# Patient Record
Sex: FEMALE | Race: WHITE | NOT HISPANIC OR LATINO | Employment: OTHER | URBAN - METROPOLITAN AREA
[De-identification: names, ages, dates, MRNs, and addresses within clinical notes are randomized per-mention and may not be internally consistent; named-entity substitution may affect disease eponyms.]

---

## 2017-04-11 ENCOUNTER — APPOINTMENT (OUTPATIENT)
Dept: WOMENS IMAGING | Facility: HOSPITAL | Age: 77
End: 2017-04-11

## 2017-04-11 PROCEDURE — G0202 SCR MAMMO BI INCL CAD: HCPCS | Performed by: RADIOLOGY

## 2017-04-11 PROCEDURE — 77063 BREAST TOMOSYNTHESIS BI: CPT | Performed by: RADIOLOGY

## 2018-02-07 ENCOUNTER — OFFICE VISIT CONVERTED (OUTPATIENT)
Dept: FAMILY MEDICINE CLINIC | Age: 78
End: 2018-02-07
Attending: FAMILY MEDICINE

## 2018-04-12 ENCOUNTER — APPOINTMENT (OUTPATIENT)
Dept: WOMENS IMAGING | Facility: HOSPITAL | Age: 78
End: 2018-04-12

## 2018-04-12 PROCEDURE — 77067 SCR MAMMO BI INCL CAD: CPT | Performed by: RADIOLOGY

## 2018-06-12 ENCOUNTER — OFFICE VISIT CONVERTED (OUTPATIENT)
Dept: FAMILY MEDICINE CLINIC | Age: 78
End: 2018-06-12
Attending: FAMILY MEDICINE

## 2018-09-12 ENCOUNTER — OFFICE VISIT CONVERTED (OUTPATIENT)
Dept: FAMILY MEDICINE CLINIC | Age: 78
End: 2018-09-12
Attending: FAMILY MEDICINE

## 2018-10-09 ENCOUNTER — OFFICE VISIT CONVERTED (OUTPATIENT)
Dept: FAMILY MEDICINE CLINIC | Age: 78
End: 2018-10-09
Attending: NURSE PRACTITIONER

## 2018-12-10 ENCOUNTER — OFFICE VISIT CONVERTED (OUTPATIENT)
Dept: FAMILY MEDICINE CLINIC | Age: 78
End: 2018-12-10
Attending: FAMILY MEDICINE

## 2019-03-11 ENCOUNTER — OFFICE VISIT CONVERTED (OUTPATIENT)
Dept: FAMILY MEDICINE CLINIC | Age: 79
End: 2019-03-11
Attending: FAMILY MEDICINE

## 2019-03-18 ENCOUNTER — OFFICE VISIT CONVERTED (OUTPATIENT)
Dept: FAMILY MEDICINE CLINIC | Age: 79
End: 2019-03-18
Attending: FAMILY MEDICINE

## 2019-04-24 ENCOUNTER — HOSPITAL ENCOUNTER (OUTPATIENT)
Dept: OTHER | Facility: HOSPITAL | Age: 79
Discharge: HOME | End: 2019-04-24
Attending: FAMILY MEDICINE

## 2019-06-12 ENCOUNTER — OFFICE VISIT CONVERTED (OUTPATIENT)
Dept: FAMILY MEDICINE CLINIC | Age: 79
End: 2019-06-12
Attending: FAMILY MEDICINE

## 2019-09-13 ENCOUNTER — HOSPITAL ENCOUNTER (OUTPATIENT)
Dept: OTHER | Facility: HOSPITAL | Age: 79
Discharge: HOME | End: 2019-09-13
Attending: FAMILY MEDICINE

## 2019-09-13 ENCOUNTER — OFFICE VISIT CONVERTED (OUTPATIENT)
Dept: FAMILY MEDICINE CLINIC | Age: 79
End: 2019-09-13
Attending: FAMILY MEDICINE

## 2019-09-13 LAB
ALBUMIN SERPL-MCNC: 4.6 G/DL (ref 3.5–5)
ALBUMIN/GLOB SERPL: 1.6 {RATIO} (ref 1.4–2.6)
ALP SERPL-CCNC: 58 U/L (ref 43–160)
ALT SERPL-CCNC: 24 U/L (ref 10–40)
ANION GAP SERPL CALC-SCNC: 18 MMOL/L (ref 8–19)
AST SERPL-CCNC: 26 U/L (ref 15–50)
BILIRUB SERPL-MCNC: 0.19 MG/DL (ref 0.2–1.3)
BUN SERPL-MCNC: 20 MG/DL (ref 5–25)
BUN/CREAT SERPL: 19 {RATIO} (ref 6–20)
CALCIUM SERPL-MCNC: 9.9 MG/DL (ref 8.7–10.4)
CHLORIDE SERPL-SCNC: 102 MMOL/L (ref 99–111)
CHOLEST SERPL-MCNC: 211 MG/DL (ref 107–200)
CHOLEST/HDLC SERPL: 4.3 {RATIO} (ref 3–6)
CONV CO2: 26 MMOL/L (ref 22–32)
CONV TOTAL PROTEIN: 7.4 G/DL (ref 6.3–8.2)
CREAT UR-MCNC: 1.07 MG/DL (ref 0.5–0.9)
EST. AVERAGE GLUCOSE BLD GHB EST-MCNC: 163 MG/DL
GFR SERPLBLD BASED ON 1.73 SQ M-ARVRAT: 50 ML/MIN/{1.73_M2}
GLOBULIN UR ELPH-MCNC: 2.8 G/DL (ref 2–3.5)
GLUCOSE SERPL-MCNC: 104 MG/DL (ref 65–99)
HBA1C MFR BLD: 7.3 % (ref 3.5–5.7)
HDLC SERPL-MCNC: 49 MG/DL (ref 40–60)
LDLC SERPL CALC-MCNC: 111 MG/DL (ref 70–100)
OSMOLALITY SERPL CALC.SUM OF ELEC: 297 MOSM/KG (ref 273–304)
POTASSIUM SERPL-SCNC: 4.1 MMOL/L (ref 3.5–5.3)
SODIUM SERPL-SCNC: 142 MMOL/L (ref 135–147)
TRIGL SERPL-MCNC: 255 MG/DL (ref 40–150)
TSH SERPL-ACNC: 2.37 M[IU]/L (ref 0.27–4.2)
URATE SERPL-MCNC: 9.1 MG/DL (ref 2.5–7.5)
VLDLC SERPL-MCNC: 51 MG/DL (ref 5–37)

## 2019-09-18 ENCOUNTER — TRANSCRIBE ORDERS (OUTPATIENT)
Dept: ADMINISTRATIVE | Facility: HOSPITAL | Age: 79
End: 2019-09-18

## 2019-09-18 DIAGNOSIS — L72.9 SKIN CYST: Primary | ICD-10-CM

## 2019-09-25 ENCOUNTER — HOSPITAL ENCOUNTER (OUTPATIENT)
Dept: INFUSION THERAPY | Facility: HOSPITAL | Age: 79
Discharge: HOME | End: 2019-09-25
Admitting: SPECIALIST

## 2019-09-25 VITALS
HEART RATE: 60 BPM | TEMPERATURE: 96.9 F | BODY MASS INDEX: 40.67 KG/M2 | RESPIRATION RATE: 18 BRPM | DIASTOLIC BLOOD PRESSURE: 70 MMHG | SYSTOLIC BLOOD PRESSURE: 154 MMHG | OXYGEN SATURATION: 95 % | HEIGHT: 62 IN | WEIGHT: 221 LBS

## 2019-09-25 DIAGNOSIS — L72.9 SKIN CYST: Primary | ICD-10-CM

## 2019-09-25 PROCEDURE — 87070 CULTURE OTHR SPECIMN AEROBIC: CPT | Performed by: SPECIALIST

## 2019-09-25 PROCEDURE — 88304 TISSUE EXAM BY PATHOLOGIST: CPT | Performed by: SPECIALIST

## 2019-09-25 PROCEDURE — 87205 SMEAR GRAM STAIN: CPT | Performed by: SPECIALIST

## 2019-09-25 RX ORDER — DOXAZOSIN 8 MG/1
8 TABLET ORAL DAILY
COMMUNITY
End: 2021-06-23

## 2019-09-25 RX ORDER — POTASSIUM CHLORIDE 750 MG/1
10 TABLET, FILM COATED, EXTENDED RELEASE ORAL DAILY
COMMUNITY
End: 2021-11-02

## 2019-09-25 RX ORDER — METOPROLOL TARTRATE 50 MG/1
50 TABLET, FILM COATED ORAL DAILY
COMMUNITY
End: 2021-06-23

## 2019-09-25 RX ORDER — CHLORAL HYDRATE 500 MG
1000 CAPSULE ORAL
COMMUNITY
End: 2021-06-23

## 2019-09-25 RX ORDER — BENAZEPRIL HYDROCHLORIDE 40 MG/1
40 TABLET, FILM COATED ORAL DAILY
COMMUNITY
End: 2021-07-27

## 2019-09-25 RX ORDER — ASPIRIN 81 MG/1
81 TABLET ORAL DAILY
COMMUNITY

## 2019-09-25 RX ORDER — TORSEMIDE 20 MG/1
20 TABLET ORAL DAILY
COMMUNITY
End: 2021-06-23 | Stop reason: ALTCHOICE

## 2019-09-25 RX ORDER — MONTELUKAST SODIUM 10 MG/1
10 TABLET ORAL DAILY
COMMUNITY
End: 2022-03-01

## 2019-09-25 RX ORDER — GLIMEPIRIDE 4 MG/1
4 TABLET ORAL 2 TIMES DAILY
COMMUNITY
End: 2021-08-16

## 2019-09-25 RX ORDER — EZETIMIBE 10 MG/1
10 TABLET ORAL DAILY
COMMUNITY
End: 2021-08-16 | Stop reason: ALTCHOICE

## 2019-09-25 RX ORDER — FENOFIBRATE 48 MG/1
48 TABLET, COATED ORAL DAILY
COMMUNITY
End: 2021-11-01

## 2019-09-25 RX ORDER — LEVOTHYROXINE SODIUM 0.05 MG/1
75 TABLET ORAL DAILY
COMMUNITY
End: 2022-03-03 | Stop reason: SDUPTHER

## 2019-09-25 RX ORDER — OMEPRAZOLE 20 MG/1
20 CAPSULE, DELAYED RELEASE ORAL DAILY PRN
COMMUNITY

## 2019-09-25 RX ORDER — LIDOCAINE HYDROCHLORIDE AND EPINEPHRINE 10; 10 MG/ML; UG/ML
20 INJECTION, SOLUTION INFILTRATION; PERINEURAL ONCE
Status: COMPLETED | OUTPATIENT
Start: 2019-09-25 | End: 2019-09-25

## 2019-09-25 RX ADMIN — LIDOCAINE HYDROCHLORIDE,EPINEPHRINE BITARTRATE 18 ML: 10; .01 INJECTION, SOLUTION INFILTRATION; PERINEURAL at 10:32

## 2019-09-25 NOTE — PATIENT INSTRUCTIONS
MINOR SURGERY DISCHARGE INSTRUCTIONS    IMPORTANT:  The following information will help you return to your best level of health.    Wound Care:  ·  Your dressing may be removed:  ·   In 3-4 days.    ·   Leave dressing on for doctor to remove when you see him at his office.  · If incision is near the ear, clean telephones.  · If incision is on head or neck, cover your pillow with a towel.  · Allow steri-strips to come off as you bathe/shower.  Steri-strips will likely have a red or brown blood stain on them.  The incision will still be secure.    · No cosmetics to incision.  · You may use band aids after dressing is removed.  · Avoid stooping over or heavy lifting.    You may shower:  · Tomorrow  · Do not get steri-strips soaking wet.  · Apply ice to area 30 minutes on and off for the rest of the day and possibly the next day if helpful.  · Elevate area for 24-48 hours to reduce swelling.        If incision is on finger or toe:   Do not get dressing wet.  Recommend glove and Glad Press N Seal for showering.    Check the color of skin at the end of the finger or toe.    Medications:   Following this procedure, you should continue to take all other medications as directed by your primary  physician unless otherwise instructed. There have been no changes to the medications you provided  us (with the following exceptions, if applicable):   Resume taking your  Aspirin on 09/26/19   You may use Tylenol or Advil for discomfort.    Activity:   You may increase your activity as tolerated.   You may resume normal activity:     Tomorrow    No strenuous activity, lifting or sports:    Today      Call your doctor to make an appointment for:     On (date) _10/04/19  Dr. Turk     Office # [phone]        Clindamycin capsules  What is this medicine?  CLINDAMYCIN (KLIN da MYE sin) is a lincosamide antibiotic. It is used to treat certain kinds of bacterial infections. It will not work for colds, flu, or other viral  infections.  This medicine may be used for other purposes; ask your health care provider or pharmacist if you have questions.  COMMON BRAND NAME(S): Cleocin  What should I tell my health care provider before I take this medicine?  They need to know if you have any of these conditions:  -kidney disease  -liver disease  -stomach problems like colitis  -an unusual or allergic reaction to clindamycin, lincomycin, or other medicines, foods, dyes like tartrazine or preservatives  -pregnant or trying to get pregnant  -breast-feeding  How should I use this medicine?  Take this medicine by mouth with a full glass of water. Follow the directions on the prescription label. You can take this medicine with food or on an empty stomach. If the medicine upsets your stomach, take it with food. Take your medicine at regular intervals. Do not take your medicine more often than directed. Take all of your medicine as directed even if you think your are better. Do not skip doses or stop your medicine early.  Talk to your pediatrician regarding the use of this medicine in children. Special care may be needed.  Overdosage: If you think you have taken too much of this medicine contact a poison control center or emergency room at once.  NOTE: This medicine is only for you. Do not share this medicine with others.  What if I miss a dose?  If you miss a dose, take it as soon as you can. If it is almost time for your next dose, take only that dose. Do not take double or extra doses.  What may interact with this medicine?  -birth control pills  -erythromycin  -medicines that relax muscles for surgery  -rifampin  This list may not describe all possible interactions. Give your health care provider a list of all the medicines, herbs, non-prescription drugs, or dietary supplements you use. Also tell them if you smoke, drink alcohol, or use illegal drugs. Some items may interact with your medicine.  What should I watch for while using this  medicine?  Tell your doctor or healthcare professional if your symptoms do not start to get better or if they get worse.  Do not treat diarrhea with over the counter products. Contact your doctor if you have diarrhea that lasts more than 2 days or if it is severe and watery.  What side effects may I notice from receiving this medicine?  Side effects that you should report to your doctor or health care professional as soon as possible:  -allergic reactions like skin rash, itching or hives, swelling of the face, lips, or tongue  -dark urine  -pain on swallowing  -redness, blistering, peeling or loosening of the skin, including inside the mouth  -unusual bleeding or bruising  -unusually weak or tired  -yellowing of eyes or skin  Side effects that usually do not require medical attention (report to your doctor or health care professional if they continue or are bothersome):  -diarrhea  -itching in the rectal or genital area  -joint pain  -nausea, vomiting  -stomach pain  This list may not describe all possible side effects. Call your doctor for medical advice about side effects. You may report side effects to FDA at 4-545-FDA-0918.  Where should I keep my medicine?  Keep out of the reach of children.  Store at room temperature between 20 and 25 degrees C (68 and 77 degrees F). Throw away any unused medicine after the expiration date.  NOTE: This sheet is a summary. It may not cover all possible information. If you have questions about this medicine, talk to your doctor, pharmacist, or health care provider.  © 2019 [employer] (2017-03-22 16:34:00)

## 2019-09-25 NOTE — PROGRESS NOTES
Patient tolerated procedure well.  Initial blood pressure elevated and procedure not started until blood pressure in her usual range.  Allergies assessed prior to start of procedure.   Patient history completed after procedure as she has not been to our hospital since Epic documentation instigated in 2016.  Her daughter was allowed back into minor room with patient permission for history.   AVS discussed and copy given to patient.  Discharged home amb with daughter.

## 2019-09-26 LAB
CYTO UR: NORMAL
LAB AP CASE REPORT: NORMAL
LAB AP CLINICAL INFORMATION: NORMAL
PATH REPORT.FINAL DX SPEC: NORMAL
PATH REPORT.GROSS SPEC: NORMAL

## 2019-09-28 LAB
BACTERIA SPEC AEROBE CULT: NORMAL
GRAM STN SPEC: NORMAL

## 2019-12-12 ENCOUNTER — HOSPITAL ENCOUNTER (OUTPATIENT)
Dept: OTHER | Facility: HOSPITAL | Age: 79
Discharge: HOME | End: 2019-12-12
Attending: FAMILY MEDICINE

## 2019-12-12 ENCOUNTER — OFFICE VISIT CONVERTED (OUTPATIENT)
Dept: FAMILY MEDICINE CLINIC | Age: 79
End: 2019-12-12
Attending: FAMILY MEDICINE

## 2019-12-12 LAB — TSH SERPL-ACNC: 2.54 M[IU]/L (ref 0.27–4.2)

## 2020-06-19 ENCOUNTER — OFFICE VISIT CONVERTED (OUTPATIENT)
Dept: FAMILY MEDICINE CLINIC | Age: 80
End: 2020-06-19
Attending: FAMILY MEDICINE

## 2020-06-19 ENCOUNTER — HOSPITAL ENCOUNTER (OUTPATIENT)
Dept: OTHER | Facility: HOSPITAL | Age: 80
Discharge: HOME | End: 2020-06-19
Attending: FAMILY MEDICINE

## 2020-06-19 LAB
ALBUMIN SERPL-MCNC: 4.6 G/DL (ref 3.5–5)
ALBUMIN/GLOB SERPL: 1.6 {RATIO} (ref 1.4–2.6)
ALP SERPL-CCNC: 64 U/L (ref 43–160)
ALT SERPL-CCNC: 35 U/L (ref 10–40)
ANION GAP SERPL CALC-SCNC: 16 MMOL/L (ref 8–19)
AST SERPL-CCNC: 37 U/L (ref 15–50)
BILIRUB SERPL-MCNC: 0.22 MG/DL (ref 0.2–1.3)
BUN SERPL-MCNC: 21 MG/DL (ref 5–25)
BUN/CREAT SERPL: 23 {RATIO} (ref 6–20)
CALCIUM SERPL-MCNC: 10.5 MG/DL (ref 8.7–10.4)
CHLORIDE SERPL-SCNC: 103 MMOL/L (ref 99–111)
CHOLEST SERPL-MCNC: 235 MG/DL (ref 107–200)
CHOLEST/HDLC SERPL: 5 {RATIO} (ref 3–6)
CONV CO2: 25 MMOL/L (ref 22–32)
CONV CREATININE URINE, RANDOM: 12.6 MG/DL (ref 10–300)
CONV MICROALBUM.,U,RANDOM: <12 MG/L (ref 0–20)
CONV TOTAL PROTEIN: 7.4 G/DL (ref 6.3–8.2)
CREAT UR-MCNC: 0.92 MG/DL (ref 0.5–0.9)
EST. AVERAGE GLUCOSE BLD GHB EST-MCNC: 180 MG/DL
GFR SERPLBLD BASED ON 1.73 SQ M-ARVRAT: 59 ML/MIN/{1.73_M2}
GLOBULIN UR ELPH-MCNC: 2.8 G/DL (ref 2–3.5)
GLUCOSE SERPL-MCNC: 128 MG/DL (ref 65–99)
HBA1C MFR BLD: 7.9 % (ref 3.5–5.7)
HDLC SERPL-MCNC: 47 MG/DL (ref 40–60)
LDLC SERPL CALC-MCNC: 136 MG/DL (ref 70–100)
MICROALBUMIN/CREAT UR: 95.2 MG/G{CRE} (ref 0–35)
OSMOLALITY SERPL CALC.SUM OF ELEC: 295 MOSM/KG (ref 273–304)
POTASSIUM SERPL-SCNC: 4.2 MMOL/L (ref 3.5–5.3)
SODIUM SERPL-SCNC: 140 MMOL/L (ref 135–147)
T4 FREE SERPL-MCNC: 1.4 NG/DL (ref 0.9–1.8)
TRIGL SERPL-MCNC: 258 MG/DL (ref 40–150)
TSH SERPL-ACNC: 4.78 M[IU]/L (ref 0.27–4.2)
URATE SERPL-MCNC: 5.8 MG/DL (ref 2.5–7.5)
VLDLC SERPL-MCNC: 52 MG/DL (ref 5–37)

## 2020-07-09 ENCOUNTER — HOSPITAL ENCOUNTER (OUTPATIENT)
Dept: OTHER | Facility: HOSPITAL | Age: 80
Discharge: HOME | End: 2020-07-09
Attending: FAMILY MEDICINE

## 2020-08-06 ENCOUNTER — HOSPITAL ENCOUNTER (OUTPATIENT)
Dept: OTHER | Facility: HOSPITAL | Age: 80
Discharge: HOME | End: 2020-08-06
Attending: FAMILY MEDICINE

## 2020-08-06 LAB — TSH SERPL-ACNC: 1.71 M[IU]/L (ref 0.27–4.2)

## 2020-09-22 ENCOUNTER — HOSPITAL ENCOUNTER (OUTPATIENT)
Dept: OTHER | Facility: HOSPITAL | Age: 80
Discharge: HOME | End: 2020-09-22
Attending: FAMILY MEDICINE

## 2020-09-22 ENCOUNTER — OFFICE VISIT CONVERTED (OUTPATIENT)
Dept: FAMILY MEDICINE CLINIC | Age: 80
End: 2020-09-22
Attending: FAMILY MEDICINE

## 2020-09-22 LAB
TSH SERPL-ACNC: 0.95 M[IU]/L (ref 0.27–4.2)
URATE SERPL-MCNC: 5.6 MG/DL (ref 2.5–7.5)

## 2020-09-23 LAB
ALBUMIN SERPL-MCNC: 4.6 G/DL (ref 3.5–5)
ALBUMIN/GLOB SERPL: 1.8 {RATIO} (ref 1.4–2.6)
ALP SERPL-CCNC: 63 U/L (ref 43–160)
ALT SERPL-CCNC: 37 U/L (ref 10–40)
ANION GAP SERPL CALC-SCNC: 19 MMOL/L (ref 8–19)
AST SERPL-CCNC: 50 U/L (ref 15–50)
BILIRUB SERPL-MCNC: 0.22 MG/DL (ref 0.2–1.3)
BUN SERPL-MCNC: 19 MG/DL (ref 5–25)
BUN/CREAT SERPL: 17 {RATIO} (ref 6–20)
CALCIUM SERPL-MCNC: 10.1 MG/DL (ref 8.7–10.4)
CHLORIDE SERPL-SCNC: 100 MMOL/L (ref 99–111)
CHOLEST SERPL-MCNC: 234 MG/DL (ref 107–200)
CHOLEST/HDLC SERPL: 5 {RATIO} (ref 3–6)
CONV CO2: 27 MMOL/L (ref 22–32)
CONV CREATININE URINE, RANDOM: 71.6 MG/DL (ref 10–300)
CONV MICROALBUM.,U,RANDOM: 14.1 MG/L (ref 0–20)
CONV TOTAL PROTEIN: 7.2 G/DL (ref 6.3–8.2)
CREAT UR-MCNC: 1.1 MG/DL (ref 0.5–0.9)
EST. AVERAGE GLUCOSE BLD GHB EST-MCNC: 166 MG/DL
GFR SERPLBLD BASED ON 1.73 SQ M-ARVRAT: 48 ML/MIN/{1.73_M2}
GLOBULIN UR ELPH-MCNC: 2.6 G/DL (ref 2–3.5)
GLUCOSE SERPL-MCNC: 168 MG/DL (ref 65–99)
HBA1C MFR BLD: 7.4 % (ref 3.5–5.7)
HDLC SERPL-MCNC: 47 MG/DL (ref 40–60)
LDLC SERPL CALC-MCNC: 114 MG/DL (ref 70–100)
MICROALBUMIN/CREAT UR: 19.7 MG/G{CRE} (ref 0–35)
OSMOLALITY SERPL CALC.SUM OF ELEC: 298 MOSM/KG (ref 273–304)
POTASSIUM SERPL-SCNC: 4.6 MMOL/L (ref 3.5–5.3)
SODIUM SERPL-SCNC: 141 MMOL/L (ref 135–147)
TRIGL SERPL-MCNC: 364 MG/DL (ref 40–150)
VLDLC SERPL-MCNC: 73 MG/DL (ref 5–37)

## 2020-11-23 ENCOUNTER — OFFICE VISIT CONVERTED (OUTPATIENT)
Dept: FAMILY MEDICINE CLINIC | Age: 80
End: 2020-11-23
Attending: FAMILY MEDICINE

## 2020-12-18 ENCOUNTER — OFFICE VISIT CONVERTED (OUTPATIENT)
Dept: FAMILY MEDICINE CLINIC | Age: 80
End: 2020-12-18
Attending: FAMILY MEDICINE

## 2021-01-12 ENCOUNTER — HOSPITAL ENCOUNTER (OUTPATIENT)
Dept: OTHER | Facility: HOSPITAL | Age: 81
Discharge: HOME | End: 2021-01-12
Attending: FAMILY MEDICINE

## 2021-01-12 ENCOUNTER — OFFICE VISIT CONVERTED (OUTPATIENT)
Dept: FAMILY MEDICINE CLINIC | Age: 81
End: 2021-01-12
Attending: FAMILY MEDICINE

## 2021-01-12 LAB
ALBUMIN SERPL-MCNC: 4.3 G/DL (ref 3.5–5)
ALBUMIN/GLOB SERPL: 1.4 {RATIO} (ref 1.4–2.6)
ALP SERPL-CCNC: 63 U/L (ref 43–160)
ALT SERPL-CCNC: 20 U/L (ref 10–40)
ANION GAP SERPL CALC-SCNC: 20 MMOL/L (ref 8–19)
AST SERPL-CCNC: 31 U/L (ref 15–50)
BASOPHILS # BLD MANUAL: 0.03 10*3/UL (ref 0–0.2)
BASOPHILS NFR BLD MANUAL: 0.3 % (ref 0–3)
BILIRUB SERPL-MCNC: 0.49 MG/DL (ref 0.2–1.3)
BNP SERPL-MCNC: 966 PG/ML (ref 0–1800)
BUN SERPL-MCNC: 9 MG/DL (ref 5–25)
BUN/CREAT SERPL: 11 {RATIO} (ref 6–20)
CALCIUM SERPL-MCNC: 10.1 MG/DL (ref 8.7–10.4)
CHLORIDE SERPL-SCNC: 103 MMOL/L (ref 99–111)
CONV CO2: 22 MMOL/L (ref 22–32)
CONV TOTAL PROTEIN: 7.3 G/DL (ref 6.3–8.2)
CREAT UR-MCNC: 0.79 MG/DL (ref 0.5–0.9)
DEPRECATED RDW RBC AUTO: 60.4 FL
EOSINOPHIL # BLD MANUAL: 0.36 10*3/UL (ref 0–0.7)
EOSINOPHIL NFR BLD MANUAL: 3.8 % (ref 0–7)
ERYTHROCYTE [DISTWIDTH] IN BLOOD BY AUTOMATED COUNT: 18 % (ref 11.5–14.5)
GFR SERPLBLD BASED ON 1.73 SQ M-ARVRAT: >60 ML/MIN/{1.73_M2}
GLOBULIN UR ELPH-MCNC: 3 G/DL (ref 2–3.5)
GLUCOSE SERPL-MCNC: 127 MG/DL (ref 65–99)
GRANS (ABSOLUTE): 6.71 10*3/UL (ref 2–8)
GRANS: 71.3 % (ref 30–85)
HBA1C MFR BLD: 12.2 G/DL (ref 12–16)
HCT VFR BLD AUTO: 39.6 % (ref 37–47)
IMM GRANULOCYTES # BLD: 0.01 10*3/UL (ref 0–0.54)
IMM GRANULOCYTES NFR BLD: 0.1 % (ref 0–0.43)
LYMPHOCYTES # BLD MANUAL: 1.86 10*3/UL (ref 1–5)
LYMPHOCYTES NFR BLD MANUAL: 4.8 % (ref 3–10)
MCH RBC QN AUTO: 27.8 PG (ref 27–31)
MCHC RBC AUTO-ENTMCNC: 30.8 G/DL (ref 33–37)
MCV RBC AUTO: 90.2 FL (ref 81–99)
MONOCYTES # BLD AUTO: 0.45 10*3/UL (ref 0.2–1.2)
OSMOLALITY SERPL CALC.SUM OF ELEC: 292 MOSM/KG (ref 273–304)
PLATELET # BLD AUTO: 211 10*3/UL (ref 130–400)
PMV BLD AUTO: 12.1 FL (ref 7.4–10.4)
POTASSIUM SERPL-SCNC: 4.3 MMOL/L (ref 3.5–5.3)
RBC # BLD AUTO: 4.39 10*6/UL (ref 4.2–5.4)
SODIUM SERPL-SCNC: 141 MMOL/L (ref 135–147)
VARIANT LYMPHS NFR BLD MANUAL: 19.7 % (ref 20–45)
WBC # BLD AUTO: 9.42 10*3/UL (ref 4.8–10.8)

## 2021-01-29 ENCOUNTER — HOSPITAL ENCOUNTER (OUTPATIENT)
Dept: OTHER | Facility: HOSPITAL | Age: 81
Discharge: HOME | End: 2021-01-29
Attending: FAMILY MEDICINE

## 2021-01-30 LAB
ANION GAP SERPL CALC-SCNC: 18 MMOL/L (ref 8–19)
BUN SERPL-MCNC: 16 MG/DL (ref 5–25)
BUN/CREAT SERPL: 16 {RATIO} (ref 6–20)
CALCIUM SERPL-MCNC: 10 MG/DL (ref 8.7–10.4)
CHLORIDE SERPL-SCNC: 101 MMOL/L (ref 99–111)
CONV CO2: 25 MMOL/L (ref 22–32)
CREAT UR-MCNC: 0.99 MG/DL (ref 0.5–0.9)
GFR SERPLBLD BASED ON 1.73 SQ M-ARVRAT: 54 ML/MIN/{1.73_M2}
GLUCOSE SERPL-MCNC: 98 MG/DL (ref 65–99)
OSMOLALITY SERPL CALC.SUM OF ELEC: 291 MOSM/KG (ref 273–304)
POTASSIUM SERPL-SCNC: 4.1 MMOL/L (ref 3.5–5.3)
SODIUM SERPL-SCNC: 140 MMOL/L (ref 135–147)

## 2021-03-23 ENCOUNTER — OFFICE VISIT CONVERTED (OUTPATIENT)
Dept: FAMILY MEDICINE CLINIC | Age: 81
End: 2021-03-23
Attending: FAMILY MEDICINE

## 2021-03-23 ENCOUNTER — HOSPITAL ENCOUNTER (OUTPATIENT)
Dept: OTHER | Facility: HOSPITAL | Age: 81
Discharge: HOME | End: 2021-03-23
Attending: FAMILY MEDICINE

## 2021-03-23 LAB
ALBUMIN SERPL-MCNC: 4.3 G/DL (ref 3.5–5)
ALBUMIN/GLOB SERPL: 1.5 {RATIO} (ref 1.4–2.6)
ALP SERPL-CCNC: 67 U/L (ref 43–160)
ALT SERPL-CCNC: 18 U/L (ref 10–40)
ANION GAP SERPL CALC-SCNC: 21 MMOL/L (ref 8–19)
APPEARANCE UR: CLEAR
AST SERPL-CCNC: 25 U/L (ref 15–50)
BILIRUB SERPL-MCNC: 0.17 MG/DL (ref 0.2–1.3)
BILIRUB UR QL: NEGATIVE
BUN SERPL-MCNC: 20 MG/DL (ref 5–25)
BUN/CREAT SERPL: 22 {RATIO} (ref 6–20)
CALCIUM SERPL-MCNC: 9.9 MG/DL (ref 8.7–10.4)
CHLORIDE SERPL-SCNC: 103 MMOL/L (ref 99–111)
CHOLEST SERPL-MCNC: 213 MG/DL (ref 107–200)
CHOLEST/HDLC SERPL: 5 {RATIO} (ref 3–6)
COLOR UR: YELLOW
CONV BACTERIA: NEGATIVE
CONV CO2: 21 MMOL/L (ref 22–32)
CONV COLLECTION SOURCE (UA): ABNORMAL
CONV CREATININE URINE, RANDOM: 52.8 MG/DL (ref 10–300)
CONV MICROALBUM.,U,RANDOM: 215.4 MG/L (ref 0–20)
CONV TOTAL PROTEIN: 7.2 G/DL (ref 6.3–8.2)
CONV UROBILINOGEN IN URINE BY AUTOMATED TEST STRIP: 0.2 {EHRLICHU}/DL (ref 0.1–1)
CREAT UR-MCNC: 0.93 MG/DL (ref 0.5–0.9)
EST. AVERAGE GLUCOSE BLD GHB EST-MCNC: 194 MG/DL
GFR SERPLBLD BASED ON 1.73 SQ M-ARVRAT: 58 ML/MIN/{1.73_M2}
GLOBULIN UR ELPH-MCNC: 2.9 G/DL (ref 2–3.5)
GLUCOSE SERPL-MCNC: 139 MG/DL (ref 65–99)
GLUCOSE UR QL: NEGATIVE MG/DL
HBA1C MFR BLD: 8.4 % (ref 3.5–5.7)
HDLC SERPL-MCNC: 43 MG/DL (ref 40–60)
HGB UR QL STRIP: NEGATIVE
KETONES UR QL STRIP: NEGATIVE MG/DL
LDLC SERPL CALC-MCNC: 97 MG/DL (ref 70–100)
LEUKOCYTE ESTERASE UR QL STRIP: ABNORMAL
MICROALBUMIN/CREAT UR: 408 MG/G{CRE} (ref 0–35)
NITRITE UR QL STRIP: NEGATIVE
OSMOLALITY SERPL CALC.SUM OF ELEC: 297 MOSM/KG (ref 273–304)
PH UR STRIP.AUTO: 5 [PH] (ref 5–8)
POTASSIUM SERPL-SCNC: 4.3 MMOL/L (ref 3.5–5.3)
PROT UR QL: 30 MG/DL
RBC #/AREA URNS HPF: ABNORMAL /[HPF]
SODIUM SERPL-SCNC: 141 MMOL/L (ref 135–147)
SP GR UR: 1.01 (ref 1–1.03)
TRIGL SERPL-MCNC: 365 MG/DL (ref 40–150)
TSH SERPL-ACNC: 2.65 M[IU]/L (ref 0.27–4.2)
URATE SERPL-MCNC: 4.9 MG/DL (ref 2.5–7.5)
VLDLC SERPL-MCNC: 73 MG/DL (ref 5–37)
WBC #/AREA URNS HPF: ABNORMAL /[HPF]

## 2021-03-26 LAB — BACTERIA UR CULT: NORMAL

## 2021-05-18 NOTE — PROGRESS NOTES
Mary Chopra  1940     Office/Outpatient Visit    Visit Date: Tue, Sep 22, 2020 03:03 pm    Provider: Sakina Donohue MD (Assistant: Lisset Gan MA)    Location: [facility]        Electronically signed by Sakina Donohue MD on  09/22/2020 05:22:29 PM                             Subjective:        CC: Mrs. Chopra is a 79 year old White female.  Patient presents today for three month follow up;         HPI: Mary is here today for routine f/u on chronic issues.          She is on metformin and Humulin-N for diabetes, taking 16 units QAM and 12 units QHS.  She is on ASA and an ACEI.  She has not been able to tolerate statins but is on Zetia for HLD.  She is UTD on eye exam, last done 9/2019; due for repeat this month.  She is scheduled to go get that done.  She is UTD on foot exam (9/2019).        She is on metoprolol succinate, benazepril, and doxazosin for HTN.  No CP, SOB, palpitations.        She is on levothyroxine for hypothyroidism.  No heat/cold intolerance.  +Dry skin.        She is on Singulair, Flonase, and azelastine for allergies.  Has not been able to afford inhalers for asthma.        She is on prn hydrocodone/APAP for left shoulder pain.  She has been on oxycodone/APAP in the past but has weaned down to the Norco.  She uses #30 tabs every 6 months or so and last got a refill in Dec.          She is on allopurinol for gout.  No recent flares.    ROS:     CONSTITUTIONAL:  Positive for unintentional weight gain ( gradual ).   Negative for fatigue or fever.      EYES:  Positive for blurred vision ( baseline ).      E/N/T:  Positive for nasal congestion, frequent rhinorrhea, sinus pressure and ear pressure and popping.   Negative for ear pain or sore throat.      CARDIOVASCULAR:  Negative for chest pain and palpitations.      RESPIRATORY:  Negative for recent cough and dyspnea.      GASTROINTESTINAL:  Negative for constipation, diarrhea, nausea and vomiting.       MUSCULOSKELETAL:  Positive for arthralgias and back pain.   Negative for myalgias.      NEUROLOGICAL:  Negative for paresthesias and weakness.          Past Medical History / Family History / Social History:         Last Reviewed on 9/22/2020 03:30 PM by Sakina Donohue    Past Medical History:             PREVENTIVE HEALTH MAINTENANCE             BONE DENSITY: was last done 4/24/2019 with normal results     COLORECTAL CANCER SCREENING: Up to date (colonoscopy q10y; sigmoidoscopy q5y; Cologuard q3y) was last done 6/14/16, Results are in chart; colonoscopy with the following abnormalities noted-- tubular adenoma x 2; The next colonoscopy is due  6/2019     DENTAL CLEANING: Refuses due to fear     EYE EXAM: Diabetic Eye Exam during this calendar year and results are in chart was last done 9/23/19 NO diabetic retinopathy     MAMMOGRAM: Done within last 2 years and results in are chart was last done 7/9/2020 with normal results     PAP SMEAR: was last done 12/19/13 with normal results No longer indicated due to age and history         PAST MEDICAL HISTORY         Positive for    Hyperlipidemia and    Hypertension;     Positive for    Pulmonary Hypertension and    RAD;     Postive for    Gastritis and    Esophageal ulcers;     Positive for    Hematuria with malrotation Rt kidney;     Positive for    DDD L-spine, Rt gluteal tendonitis and    Lumbar Radiculitis and L4-L5 Spondylosis '15;     Positive for    Type 2 Diabetes and    Hypothyroidism;     Positive for    Cataract: bilateral; declines surgery; and    Prominent Rt hepatic lobe '15;         CURRENT MEDICAL PROVIDERS:    Allergist: Dr Everett    Cardiologist: Dr Rae-not seen since 2015    Dermatologist: Dr Baumann    General Surgeon: Dr Spencer    Ophthalmologist: Dr Cowan             ADVANCE DIRECTIVES: Living will         Surgical History:         Positive for    Hysterectomy: Abdominal; Partial; ;     Positive for    Trochanter bursa injection '10;,    L-sine  disc and cyst removal '10 (L4-5 and L5-S1);,    L-spine epidural; and    Cystoscopy '05;;         Family History: NO colon, NO Breast, NO Ovarian, NO Prostate Cancer;  CAD;  CVA;         Social History: Laurence Chopra's mom     Occupation:    Retired     Marital Status:          Tobacco/Alcohol/Supplements:     Last Reviewed on 9/22/2020 03:30 PM by Sakina Donohue    Tobacco: She has a past history of cigarette smoking; quit date:  Quit Date 1990.  No Etoh;         Substance Abuse History:     Last Reviewed on 9/22/2020 03:30 PM by Sakina Donohue        Mental Health History:     Last Reviewed on 9/22/2020 03:30 PM by Sakina Donohue        Communicable Diseases (eg STDs):     Last Reviewed on 9/22/2020 03:30 PM by Sakina Donohue        Current Problems:     Last Reviewed on 6/19/2020 09:34 AM by Sakina Donohue    Neoplasm of uncertain behavior of colon    Hypothyroidism, unspecified    Type 2 diabetes mellitus with hyperglycemia    Morbid (severe) obesity due to excess calories    HTN - Essential (primary) hypertension    Moderate persistent asthma with (acute) exacerbation    Pain in unspecified hip    Low back pain    Other long term (current) drug therapy    Other fatigue    Gout, unspecified    HLD - Mixed hyperlipidemia    Age-related cognitive decline    Hemangioma unspecified site    Allergic rhinitis, unspecified    Encounter for screening mammogram for malignant neoplasm of breast    Encounter for screening for depression    Diverticulosis of large intestine without perforation or abscess without bleeding    Encounter for general adult medical examination without abnormal findings    Hematuria, unspecified    Primary pulmonary hypertension    Migraine, unspecified, not intractable, without status migrainosus    Allergic rhinitis        Immunizations:     Td adult 7/1/2008    Prevnar 13 (Pneumococcal PCV 13) 9/28/2016    Fluzone High-Dose pf (>=65 yr) 9/28/2016    Fluzone High-Dose pf (>=65 yr)  11/8/2017    Fluzone High-Dose pf (>=65 yr) 10/15/2018    Fluzone High-Dose pf (>=65 yr) 10/14/2019    PNEUMOVAX 23 (Pneumococcal PPV23) 2/1/2008    Zostavax (Zoster live) 9/1/2012        Allergies:     Last Reviewed on 9/22/2020 03:05 PM by Lisset Gan    Penicillins:      Sulfonamides:      Keflex:      Claritin:      shell fish -muscles:      Monopril:      Serevent (discontinued, use Serevent Diskus):      Avelox:      Crestor: joint pain     Banana:          Current Medications:     Last Reviewed on 9/22/2020 03:05 PM by Lisset Gan    Klor-Con 10 10 mEq oral tablet, extended release [TAKE ONE TABLET BY MOUTH DAILY]    torsemide 20 mg oral tablet [TAKE ONE TABLET BY MOUTH DAILY]    benazepriL 40 mg oral tablet [TAKE ONE TABLET BY MOUTH DAILY]    glimepiride 4 mg oral tablet [TAKE ONE TABLET BY MOUTH TWICE A DAY]    doxazosin 8 mg oral tablet [TAKE 1 TABLET BY MOUTH DAILY]    Mucinex 600 mg oral Tablet,Extended Release 12 hr [1-2 tabs daily PRN]    aspirin 81 mg oral tablet, delayed release (enteric coated) [1 tab daily]    Vitamin D3 1,000 unit oral capsule [2 daily]    Fish Oil 300-1,000 mg oral capsule [1 / day]    traZODone 50 mg oral tablet [1 tab HS PRN]    montelukast 10 mg oral tablet [TAKE ONE TABLET BY MOUTH DAILY]    omeprazole 20 mg oral capsule,delayed release (enteric coated) [TAKE ONE CAPSULE BY MOUTH DAILY]    metFORMIN 500 mg oral tablet [2 po q am and 1 po q evening]    Fluticasone Propionate 50mcg/1actuation Nasal Spray [1 or 2 sprays in each nostril qday ]    Zetia 10 mg oral tablet [1 tab daily (Pt Asst)]    Tricor 48 mg oral tablet [TAKE ONE TABLET BY MOUTH DAILY]    HYDROcodone-acetaminophen 7.5-325 mg oral tablet [1 po qd prn]    HumuLIN N NPH U-100 Insulin 100 unit/mL subcutaneous Suspension [INJECT 18 UNITS UNDER THE SKIN EVERY MORNING AND INJECT 10 UNITS UNDER THE SKIN EVERY NIGHT AT BEDTIME]    HumuLIN N NPH U-100 Insulin 100 unit/mL subcutaneous Suspension [INJECT 18 UNITS  "UNDER THE SKIN EVERY MORNING AND INJECT 10 UNITS UNDER THE SKIN EVERY NIGHT AT BEDTIME]    BD Ultra-Fine 6mm Needle 31G Insulin Syringe 0.3ml Syringe [Use with insulin TID and prn Dx E11.9]    allergy injections every 2-7 days     OTC Xyzal 10mg Take one tablet once daily      ketotifen fumarate 0.025 % (0.035 %) ophthalmic (eye) Drops [1 drop to both eyes bid]    BD Veo Insulin Syringe Ultra-Fine 0.3 mL 31 gauge x 15/64\"  [USE WITH INSULIN THREE TIMES A DAY AND AS NEEDED]    FreeStyle Lite Strips  [CHECK BLOOD SUGAR THREE TIMES A DAY]    Advair Diskus     Glucose Reagent Blood Test Strips  Reagent Strips [check blood sugar QID  DX E11.9]    albuterol sulfate 2.5 mg /3 mL (0.083 %) Inhalation Solution for Nebulization [1 vial q 4 hours prn]    metoprolol succinate 50 mg oral Tablet, Extended Release 24 hr [TAKE ONE TABLET BY MOUTH DAILY]    allopurinoL 300 mg oral tablet [TAKE ONE TABLET BY MOUTH DAILY]    levothyroxine 75 mcg oral tablet [take 1 tablet (75 mcg) by oral route once daily]        Objective:        Vitals:         Current: 9/22/2020 3:08:19 PM    Ht:  5 ft, 1.5 in;  Wt: 212 lbs;  BMI: 39.4T: 97.8 F (temporal);  BP: 161/65 mm Hg (left arm, sitting);  P: 67 bpm (left arm (BP Cuff), sitting);  sCr: 0.92 mg/dL;  GFR: 56.84        Repeat:     3:59:27 PM  BP:   144/47mm Hg (right arm, sitting, HR: 70)     Exams:     PHYSICAL EXAM:     GENERAL: vital signs recorded - well developed, well nourished;  well groomed;  no apparent distress;     EYES: extraocular movements intact; conjunctiva and cornea are normal; PERRLA;     E/N/T: OROPHARYNX:  normal mucosa, dentition, gingiva, and posterior pharynx;     RESPIRATORY: normal respiratory rate and pattern with no distress; normal breath sounds with no rales, rhonchi, wheezes or rubs;     CARDIOVASCULAR: normal rate; rhythm is regular;  no systolic murmur; no edema;     GASTROINTESTINAL: nontender; normal bowel sounds;     MUSCULOSKELETAL: normal gait; normal " overall tone     Left foot exam    Protective sensation using Monofilament test: NORMAL sensation. Patient detects .07 grams of force which is considered normal.    Vascular status: normal peripheral vascular exam with palpable dorsal pedal and posterior tibal pulses and brisk digital capillary refill    Skin is intact without sores or ulcers    Right foot exam    Protective sensation using Monofilament test: NORMAL sensation. Patient detects .07 grams of force which is considered normal.    Vascular status: normal peripheral vascular exam with palpable dorsal pedal and posterior tibal pulses and brisk digital capillary refill    Skin is intact without sores or ulcers         Lab/Test Results:         Urine temperature: confirmed (09/22/2020),     All urine drug screen levels confirmed negative: yes (09/22/2020),     Date and time of last pill: Hydrocodone 2 weeks ago/AS (09/22/2020),     Performed by: sin (09/22/2020),     Collection Time: 1603 (09/22/2020),             Assessment:         E11.65   Type 2 diabetes mellitus with hyperglycemia       I10   HTN - Essential (primary) hypertension       E78.2   HLD - Mixed hyperlipidemia       E03.9   Hypothyroidism, unspecified       M10.9   Gout, unspecified       J45.40   Moderate persistent asthma, uncomplicated       M54.5   Low back pain       Z79.899   Other long term (current) drug therapy       Z23   Encounter for immunization       J01.00   Acute maxillary sinusitis, unspecified           ORDERS:         Meds Prescribed:       [Refilled] HYDROcodone-acetaminophen 7.5-325 mg oral tablet [1 po qd prn], #20 (twenty) tablets, Refills: 0 (zero)         Radiology/Test Orders:       3017F  Colorectal CA screen results documented and reviewed (PV)  (In-House)            2022F  Dilated retinal eye exam w/interpret by ophthalmologist/optometrist documented/reviewed (DM)4  (In-House)              Lab Orders:       APPTO  Appointment need  (In-House)            13419  URIC  - [facility] Uric Acid, Serum  (Send-Out)            43407  TSH - [facility] TSH  (Send-Out)            92003  DIAB1 - [facility] LIPID,CMP, A1C: 10213, 29195, 89370  (Send-Out)            86701  BRIANDA - [facility] Microablbumin, quantitative  (Send-Out)            28316  Drug test prsmv qual dir optical obs per day  (In-House)              Procedures Ordered:       04703  Fluzone High Dose  (In-House)              Other Orders:         Screening mammogram results documented  (Send-Out)            2028F  Foot examination performed (includes examination through visual inspection, sensory exam with monofilament, and pulse exam - report when any of the three components are completed) (DM)4  (In-House)              Administration of influenza virus vaccine  (x1)                  Plan:         Type 2 diabetes mellitus with hyperglycemiaShe is on metformin and Humulin-N for diabetes, taking 16 units QAM and 12 units QHS.  She is on ASA and an ACEI.  She has not been able to tolerate statins but is on Zetia for HLD.  She is UTD on eye exam, last done 9/2019; due for repeat this month.  She is scheduled to go get that done.  She is UTD on foot exam (9/2019).  Checking labs.  RTC 6 months.    LABORATORY:  Labs ordered to be performed today include Diabetes Panel 1; CMP, Lipid, A1C and Microalbumin.  MIPS Vaccines Flu and Pneumonia updated in Shot record Screening mammomgram done within last 2 years and results in are chart Colorectal Cancer Screening is up to date and the results are in the chart Diabetic Eye Exam during this calendar year and results are in chart     FOLLOW-UP: Schedule a follow-up visit in 6 months.:.  f/u DM with [place]          Orders:         Screening mammogram results documented  (Send-Out)            3017F  Colorectal CA screen results documented and reviewed (PV)  (In-House)            2022F  Dilated retinal eye exam w/interpret by ophthalmologist/optometrist documented/reviewed (DM)4  (In-House)             APPTO  Appointment need  (In-House)            90659  DIAB1 - [facility] LIPID,CMP, A1C: 91662, 66512, 03786  (Send-Out)            78323  BRIANDA - [facility] Microablbumin, quantitative  (Send-Out)              HTN - Essential (primary) hypertensionBP at goal.  Checking labs.  No refills needed.        HLD - Mixed hyperlipidemiaStable.  Checking labs.  No refills needed.        Hypothyroidism, unspecifiedStable.  Checking labs.  No refills needed.    LABORATORY:  Labs ordered to be performed today include TSH.            Orders:       [id number]  TSH - [facility] TSH  (Send-Out)              Gout, unspecifiedStable.  Checking labs.  No refills needed.    LABORATORY:  Labs ordered to be performed today include uric acid.            Orders:       65267  URIC - [facility] Uric Acid, Serum  (Send-Out)              Moderate persistent asthma, uncomplicatedStable.  She has not been able to afford inhalers but her breathing has been good despite this.        Low back painStable on current regimen.  Sx well controlled.  No adverse effects.  She does require ongoing use of this controlled substance to function.  Tox screen and Bran run today.  Refills needed.          Prescriptions:       [Refilled] HYDROcodone-acetaminophen 7.5-325 mg oral tablet [1 po qd prn], #20 (twenty) tablets, Refills: 0 (zero)         Other long term (current) drug therapy    Controlled substance documentation: Bran reviewed; drug screen performed and appropriate; consent is reviewed and signed and on the chart.  She is aware of risk of addiction on this medication, understands that she will need to follow up for a review every 3 months and her medications will be adjusted or decreased as deemed appropriate at each visit.  No history of drug or alcohol abuse.  No concerns about diversion or abuse. She denies side effects related to the medication.  She is aware that she may be called in for pill counts.  The dosing of this medication will be reviewed on a regular basis and  reduced if possible..  Ongoing use of a controlled substance is necessary for this patient to have a normal quality of life           Orders:       42481  Drug test prsmv qual dir optical obs per day  (In-House)              Encounter for immunization          Immunizations:       05713  Fluzone High Dose  (In-House)                Dose (ml): 0.7  Site: left deltoid  Route: intramuscular  Administered by: Lisset Gan          : [employer]  Lot #: [id number]  Exp: 06/30/2021          NDC: [address]-16        Administration of influenza virus vaccine  (x1)              Other Orders      2028F  Foot examination performed (includes examination through visual inspection, sensory exam with monofilament, and pulse exam - report when any of the three components are completed) (DM)4  (In-House)              Patient Recommendations:        For  Type 2 diabetes mellitus with hyperglycemia:    Schedule a follow-up visit in 6 months.                APPOINTMENT INFORMATION:        Monday Tuesday Wednesday Thursday Friday Saturday Sunday            Time:___________________AM  PM   Date:_____________________             Charge Capture:         Primary Diagnosis:     E11.65  Type 2 diabetes mellitus with hyperglycemia           Orders:      22563  Office/outpatient visit; established patient, level 4  (In-House)            3017F  Colorectal CA screen results documented and reviewed (PV)  (In-House)            2022F  Dilated retinal eye exam w/interpret by ophthalmologist/optometrist documented/reviewed (DM)4  (In-House)            APPTO  Appointment need  (In-House)              I10  HTN - Essential (primary) hypertension     E78.2  HLD - Mixed hyperlipidemia     E03.9  Hypothyroidism, unspecified     M10.9  Gout, unspecified     J45.40  Moderate persistent asthma, uncomplicated     M54.5  Low back pain     Z79.899  Other long term (current) drug therapy           Orders:      52076  Drug test prsmv qual  dir optical obs per day  (In-House)              Z23  Encounter for immunization           Orders:      31539  Fluzone High Dose  (In-House)              Administration of influenza virus vaccine  (x1)          J01.00  Acute maxillary sinusitis, unspecified         Other Orders:       2028F  Foot examination performed (includes examination through visual inspection, sensory exam with monofilament, and pulse exam - report when any of the three components are completed) (DM)4  (In-House)

## 2021-05-18 NOTE — PROGRESS NOTES
"Mary Chopra J  1940     Office/Outpatient Visit    Visit Date: Tue, Mar 23, 2021 03:04 pm    Provider: Sakina Donohue MD (Assistant: Kadi Garcia, )    Location: [facility]        Electronically signed by Sakina Donohue MD on  03/23/2021 05:28:24 PM                             Subjective:        CC: Mrs. Chopra is a 80 year old White female.  This is a follow-up visit.          HPI: Mary is here today for routine follow-up on chronic issues.  She has gotten her first COVID vaccine!  She did have some reactions to that.        She is on buspirone for anxiety, started after she suffered through COVID back in Dec.  She is still having brain fog.          She is on metformin and glimepiride.  BG has been running \"really high\" -- fasting 140-220.  She has been stress eating and over-eating.  She is on ASA and an ACEI.  She has not been able to tolerate statins but is on Zetia for HLD.  She is due for eye exam, last done 9/2019.  She is due  on foot exam (9/2019).          She is on metoprolol succinate, benazepril, and doxazosin for HTN.  No CP, SOB, palpitations.        She is on Eliquis for atrial fibrillation.  She is on metoprolol succinate for rate control.        She is on levothyroxine for hypothyroidism.  No heat/cold intolerance.  +Dry skin.  She has noticed a lot of hair loss.        She is on montelukast, Flonase, and azelastine for allergies.  Has not been able to afford inhalers for asthma.        She is on allopurinol for gout.  No recent flares.        She has been having severe diffuse joint pains.  She has used hydrocodone/APAP in the past.  Has not gotten any refills on this in a long time, because she has been trying to use them very sparingly.  She has used Tylenol but it hasn't worked well.  She has #4 Norcos left.    ROS:     CONSTITUTIONAL:  Positive for unintentional weight gain ( gradual ).   Negative for fatigue or fever.      EYES:  Positive for " blurred vision ( baseline ).      CARDIOVASCULAR:  Negative for chest pain and palpitations.      RESPIRATORY:  Negative for recent cough and dyspnea.      GASTROINTESTINAL:  Negative for constipation, diarrhea, nausea and vomiting.      GENITOURINARY:  Positive for bubbles when she urinates.      MUSCULOSKELETAL:  Positive for arthralgias and (on Tylenol) back pain.   Negative for myalgias.      NEUROLOGICAL:  Negative for paresthesias and weakness.      ENDOCRINE:  Positive for hair loss.          Past Medical History / Family History / Social History:         Last Reviewed on 3/23/2021 03:32 PM by Sakina Donohue    Past Medical History:             PREVENTIVE HEALTH MAINTENANCE             BONE DENSITY: was last done 4/24/2019 with normal results     COLORECTAL CANCER SCREENING: Up to date (colonoscopy q10y; sigmoidoscopy q5y; Cologuard q3y) was last done 6/14/16, Results are in chart; colonoscopy with the following abnormalities noted-- tubular adenoma x 2; The next colonoscopy is due  6/2019     DENTAL CLEANING: Refuses due to fear     EYE EXAM: Diabetic Eye Exam during this calendar year and results are in chart was last done 9/23/19 NO diabetic retinopathy     MAMMOGRAM: Done within last 2 years and results in are chart was last done 7/9/2020 with normal results     PAP SMEAR: was last done 12/19/13 with normal results No longer indicated due to age and history         PAST MEDICAL HISTORY         Positive for    Hyperlipidemia and    Hypertension;     Positive for    Pulmonary Hypertension and    RAD;     Postive for    Gastritis and    Esophageal ulcers;     Positive for    Hematuria with malrotation Rt kidney;     Positive for    DDD L-spine, Rt gluteal tendonitis and    Lumbar Radiculitis and L4-L5 Spondylosis '15;     Positive for    Type 2 Diabetes and    Hypothyroidism;     Positive for    Cataract: bilateral; declines surgery; and    Prominent Rt hepatic lobe '15;         CURRENT MEDICAL PROVIDERS:     Allergist: Dr Everett    Cardiologist: Dr Rae-not seen since 2015    Dermatologist: Dr Baumann    General Surgeon: Dr Spencer    Ophthalmologist: Dr Cowan             ADVANCE DIRECTIVES: Living will         Surgical History:         Positive for    Hysterectomy: Abdominal; Partial; ;     Positive for    Trochanter bursa injection '10;,    L-sine disc and cyst removal '10 (L4-5 and L5-S1);,    L-spine epidural; and    Cystoscopy '05;;         Family History: NO colon, NO Breast, NO Ovarian, NO Prostate Cancer;  CAD;  CVA;         Social History: Laurence Chopra's mom     Occupation:    Retired     Marital Status:          Tobacco/Alcohol/Supplements:     Last Reviewed on 3/23/2021 03:32 PM by Sakina Donohue    Tobacco: She has a past history of cigarette smoking; quit date:  Quit Date 1990.  No Etoh;         Substance Abuse History:     Last Reviewed on 3/23/2021 03:32 PM by Sakina Donohue        Mental Health History:     Last Reviewed on 3/23/2021 03:32 PM by Sakina Donohue        Communicable Diseases (eg STDs):     Last Reviewed on 3/23/2021 03:32 PM by Sakina Donohue        Current Problems:     Last Reviewed on 1/12/2021 02:19 PM by Sakina Donohue    Neoplasm of uncertain behavior of colon    Hypothyroidism, unspecified    Type 2 diabetes mellitus with hyperglycemia    Morbid (severe) obesity due to excess calories    HTN - Essential (primary) hypertension    Moderate persistent asthma with (acute) exacerbation    Pain in unspecified hip    Low back pain    Other long term (current) drug therapy    Other fatigue    Gout, unspecified    HLD - Mixed hyperlipidemia    Age-related cognitive decline    Hemangioma unspecified site    Allergic rhinitis, unspecified    Diverticulosis of large intestine without perforation or abscess without bleeding    Primary pulmonary hypertension    Hematuria, unspecified    Migraine, unspecified, not intractable, without status migrainosus    Allergic rhinitis     Moderate persistent asthma, uncomplicated    Encounter for immunization    COVID-19    Acute kidney failure, unspecified    Unspecified atrial fibrillation    Encounter for other administrative examinations    Anxiety disorder, unspecified    Shortness of breath        Immunizations:     influenza, high-dose, quadrivalent (FLUZONE HIGH-DOSE QUAD 2020-21) 9/22/2020    Td adult 7/1/2008    Prevnar 13 (Pneumococcal PCV 13) 9/28/2016    Fluzone High-Dose pf (>=65 yr) 9/28/2016    Fluzone High-Dose pf (>=65 yr) 11/8/2017    Fluzone High-Dose pf (>=65 yr) 10/15/2018    Fluzone High-Dose pf (>=65 yr) 10/14/2019    PNEUMOVAX 23 (Pneumococcal PPV23) 2/1/2008    Zostavax (Zoster live) 9/1/2012        Allergies:     Last Reviewed on 1/12/2021 02:19 PM by Sakina Donohue    Penicillins:      Sulfonamides:      Keflex:      Claritin:      shell fish -muscles:      Monopril:      Serevent (discontinued, use Serevent Diskus):      Avelox:      Banana:      Statins-Hmg-Coa Reductase Inhibitors: joint pain,Muscle aches         Current Medications:     Last Reviewed on 1/12/2021 02:19 PM by Sakina Donohue    Mucinex 600 mg oral Tablet,Extended Release 12 hr [1-2 tabs daily PRN]    aspirin 81 mg oral tablet, delayed release (enteric coated) [1 tab daily]    Vitamin D3 1,000 unit oral capsule [2 daily]    Fish Oil 300-1,000 mg oral capsule [1 / day]    glimepiride 4 mg oral tablet [TAKE ONE TABLET BY MOUTH TWICE A DAY]    omeprazole 20 mg oral capsule,delayed release (enteric coated) [TAKE ONE CAPSULE BY MOUTH DAILY]    montelukast 10 mg oral tablet [TAKE ONE TABLET BY MOUTH DAILY]    Fluticasone Propionate 50mcg/1actuation Nasal Spray [1 or 2 sprays in each nostril qday ]    metFORMIN 500 mg oral tablet [TAKE TWO TABLETS BY MOUTH EVERY MORNING AND TAKE ONE TABLET BY MOUTH EVERY EVENING]    Tricor 48 mg oral tablet [TAKE ONE TABLET BY MOUTH DAILY]    Zetia 10 mg oral tablet [1 tab daily (Pt Asst)]    allergy injections every 2-7 days  "    OTC Xyzal 10mg Take one tablet once daily      ketotifen fumarate 0.025 % (0.035 %) ophthalmic (eye) Drops [1 drop to both eyes bid]    FreeStyle Lite Strips  [CHECK BLOOD SUGAR THREE TIMES A DAY]    allopurinoL 300 mg oral tablet [TAKE ONE TABLET BY MOUTH DAILY]    levothyroxine 75 mcg oral tablet [take 1 tablet (75 mcg) by oral route once daily]    amLODIPine 5 mg oral tablet [take 1 tablet (5 mg) by oral route once daily]    busPIRone 5 mg oral tablet [take 1 tablet (5 mg) by oral route 2 times per day prn anxiety]    furosemide 20 mg oral tablet [TAKE 1 TABLET BY MOUTH DAILY]    benazepriL 20 mg oral tablet [1 tab daily]    Eliquis 5 mg oral tablet [take 1 tablet (5 mg) by oral route 2 times per day]    metoprolol succinate 100 mg oral Tablet, Extended Release 24 hr [take 1 tablet (100 mg) by oral route twice daily]        Objective:        Vitals:         Current: 3/23/2021 3:13:19 PM    Ht:  5 ft, 1.5 in;  Wt: 199 lbs;  BMI: 37.0T: 97.5 F (temporal);  BP: 155/68 mm Hg (left arm, sitting);  P: 60 bpm (left arm (BP Cuff), sitting);  sCr: 0.99 mg/dL;  GFR: 50.62        Exams:     PHYSICAL EXAM:     GENERAL: vital signs recorded - well developed, well nourished;  well groomed;  no apparent distress;     EYES: extraocular movements intact; conjunctiva and cornea are normal; PERRLA;     E/N/T: OROPHARYNX:  normal mucosa, dentition, gingiva, and posterior pharynx;     RESPIRATORY: normal respiratory rate and pattern with no distress; normal breath sounds with no rales, rhonchi, wheezes or rubs;     CARDIOVASCULAR: normal rate; rhythm is regular;  no systolic murmur; no edema;     GASTROINTESTINAL: nontender; normal bowel sounds;     MUSCULOSKELETAL: normal gait; normal overall tone         Lab/Test Results:         Urine temperature: confirmed (03/23/2021),     All urine drug screen levels confirmed negative: yes (03/23/2021),     Date and time of last pill: hydrocodone \"a month or more\"//ag (03/23/2021),     " Performed by: mariluz (03/23/2021),     Collection Time: 1615 (03/23/2021),             Procedures:     Allergic rhinitis, unspecified    Allergy Injection (2 or more) 1. Patient experienced no reaction.  exp 7/31/21, pt on Metoprolol/ pdr             Assessment:         E11.65   Type 2 diabetes mellitus with hyperglycemia       I10   HTN - Essential (primary) hypertension       E78.2   HLD - Mixed hyperlipidemia       I48.91   Unspecified atrial fibrillation       E03.9   Hypothyroidism, unspecified       M10.9   Gout, unspecified       J30.9   Allergic rhinitis, unspecified       F41.9   Anxiety disorder, unspecified       M15.0   Primary generalized (osteo)arthritis       Z79.899   Other long term (current) drug therapy           ORDERS:         Meds Prescribed:       [New Rx] Voltaren 1 % Topical Gel [apply 2 gram to the affected area(s) by topical route BID prn], #100 (one hundred) grams, Refills: 1 (one)       [Refilled] Zetia 10 mg oral tablet [1 tab daily (Pt Asst)], #90 (ninety) tablets, Refills: 1 (one)       [New Rx] HYDROcodone-acetaminophen 5-325 mg oral tablet [take 1 tablet by oral route daily prn pain], #30 (thirty) tablets, Refills: 0 (zero)       [New Rx] SITagliptin 100 mg oral tablet [take 1 tablet (100 mg) by oral route once daily], #30 (thirty) tablets, Refills: 5 (five)       [Refilled] omeprazole 20 mg oral capsule,delayed release (enteric coated) [TAKE ONE CAPSULE BY MOUTH DAILY], #90 (ninety) capsules, Refills: 1 (one)         Radiology/Test Orders:       3017F  Colorectal CA screen results documented and reviewed (PV)  (In-House)            42076  Professional services for allergen immunotherapy not including provision of allergenic extracts; two or more injections  (In-House)              Lab Orders:       [id number]  TSH - [facility] TSH  (Send-Out)            10230  DIAB1 - [facility] LIPID,CMP, A1C: 61582, 59134, 73500  (Send-Out)            78423  BRIANDA - [facility] Microablbumin, quantitative  (Send-Out)            " APPTO  Appointment need  (In-House)            70748  BDUAM - [facility] Urinalysis, automated, with micro  (Send-Out)            62572  URIC [facility] Uric Acid, Serum  (Send-Out)            22282  Drug test prsmv qual dir optical obs per day  (In-House)              Other Orders:         Screening mammogram results documented  (Send-Out)            1124F  Advance Care Planning discussed and doc in MR; no surrogate named or advance care plan provided  (Send-Out)                      Plan:         Type 2 diabetes mellitus with hyperglycemiaShe is on metformin and glimepiride.  BG has been running \"really high\" -- fasting 140-220.  She has been stress eating and over-eating.  Adding Januvia.  She is on ASA and an ACEI.  She has not been able to tolerate statins but is on Zetia and Tricor for HLD.  She is due for eye exam, last done 9/2019.  She is due on foot exam (9/2019).  RTC 3 months.        43 min were spent in this face to face encounter.    LABORATORY:  Labs ordered to be performed today include Diabetes Panel 1; CMP, Lipid, A1C, Microalbumin, and urinalysis with micro.  MIPS Vaccines Flu and Pneumonia updated in Shot record Screening mammomgram done within last 2 years and results in are chart Colorectal Cancer Screening is up to date and the results are in the chart Advance Directive/Surrogate Decision Maker discussed and pt declines to complete today     FOLLOW-UP: Schedule a follow-up visit in 3 months.:.  Medicare wellness 30 min with Jayde          Prescriptions:       [New Rx] SITagliptin 100 mg oral tablet [take 1 tablet (100 mg) by oral route once daily], #30 (thirty) tablets, Refills: 5 (five)       [Refilled] omeprazole 20 mg oral capsule,delayed release (enteric coated) [TAKE ONE CAPSULE BY MOUTH DAILY], #90 (ninety) capsules, Refills: 1 (one)           Orders:       [id number]  DIAB1 - [facility] LIPID,CMP, A1C: 65877, 82584, 34335  (Send-Out)            93216  BRIANDA [facility] Microablbumin, quantitative  (Send-Out) "              Screening mammogram results documented  (Send-Out)            3017F  Colorectal CA screen results documented and reviewed (PV)  (In-House)            1124F  Advance Care Planning discussed and doc in MR; no surrogate named or advance care plan provided  (Send-Out)            APPTO  Appointment need  (In-House)            66268  BDUAM - [facility] Urinalysis, automated, with micro  (Send-Out)              HTN - Essential (primary) hypertensionStable.  No refills needed.  Checking labs.        HLD - Mixed hyperlipidemiaRefills needed.  Checking labs.          Prescriptions:       [Refilled] Zetia 10 mg oral tablet [1 tab daily (Pt Asst)], #90 (ninety) tablets, Refills: 1 (one)         Unspecified atrial fibrillationNo refills needed.  Checking labs.  Explained her disease process at length.        Hypothyroidism, unspecifiedNo refills needed.  Checking labs.     LABORATORY:  Labs ordered to be performed today include TSH.            Orders:       [id number]  TSH [facility] TSH  (Send-Out)              Gout, unspecifiedNo refills needed.  Checking labs.     LABORATORY:  Labs ordered to be performed today include uric acid.            Orders:       [id number]  URIC - [facility] Uric Acid, Serum  (Send-Out)              Allergic rhinitis, unspecifiedStable.  No refills needed.          Orders:       60480  Professional services for allergen immunotherapy not including provision of allergenic extracts; two or more injections  (In-House)              Anxiety disorder, unspecifiedStable.  No refills.        Primary generalized (osteo)arthritisStable on current regimen.  Sx well controlled.  No adverse effects.  She does require ongoing use of this controlled substance to function; uses it very sparingly.  Tox screen and Bran run today.  No refills needed.  RTC 3 months.          Prescriptions:       [New Rx] Voltaren 1 % Topical Gel [apply 2 gram to the affected area(s) by topical route BID prn], #100 (one hundred) grams, Refills: 1  (one)       [New Rx] HYDROcodone-acetaminophen 5-325 mg oral tablet [take 1 tablet by oral route daily prn pain], #30 (thirty) tablets, Refills: 0 (zero)         Other long term (current) drug therapy    Controlled substance documentation: Bran reviewed; drug screen performed and appropriate; consent is reviewed and signed and on the chart.  She is aware of risk of addiction on this medication, understands that she will need to follow up for a review every 3 months and her medications will be adjusted or decreased as deemed appropriate at each visit.  No history of drug or alcohol abuse.  No concerns about diversion or abuse. She denies side effects related to the medication.  She is aware that she may be called in for pill counts.  The dosing of this medication will be reviewed on a regular basis and reduced if possible..  Ongoing use of a controlled substance is necessary for this patient to have a normal quality of life           Orders:       57047  Drug test prsmv qual dir optical obs per day  (In-House)                  Patient Recommendations:        For  Type 2 diabetes mellitus with hyperglycemia:    Schedule a follow-up visit in 3 months.                APPOINTMENT INFORMATION:        Monday Tuesday Wednesday Thursday Friday Saturday Sunday            Time:___________________AM  PM   Date:_____________________             Charge Capture:         Primary Diagnosis:     E11.65  Type 2 diabetes mellitus with hyperglycemia           Orders:      11738  Office/outpatient visit; established patient, level 5  (In-House)            3017F  Colorectal CA screen results documented and reviewed (PV)  (In-House)            APPTO  Appointment need  (In-House)              I10  HTN - Essential (primary) hypertension     E78.2  HLD - Mixed hyperlipidemia     I48.91  Unspecified atrial fibrillation     E03.9  Hypothyroidism, unspecified     M10.9  Gout, unspecified     J30.9  Allergic rhinitis, unspecified            Orders:      93375  Professional services for allergen immunotherapy not including provision of allergenic extracts; two or more injections  (In-House)              F41.9  Anxiety disorder, unspecified     M15.0  Primary generalized (osteo)arthritis     Z79.899  Other long term (current) drug therapy           Orders:      23561  Drug test prsmv qual dir optical obs per day  (In-House)

## 2021-05-18 NOTE — PROGRESS NOTES
Mary Chopra  1940     Office/Outpatient Visit    Visit Date: Fri, Dec 18, 2020 09:03 am    Provider: Kory Gardner MD (Assistant: Kadi Garcia, )    Location: [facility]        Electronically signed by Kory Gardner MD on  12/20/2020 06:01:53 AM                             Subjective:        CC: atrial fibrillation, COVID        TELEMEDICINE VISIT:    - Mary consented to this telemedicine visit.    - Persons present during the telemedicine consultation include:  Mary - patient, Dr. Gardner    - [facility] with [facility] and video.Mrs. Chopra is a 80 year old White female.  She is here today following a transition of care from an inpatient hospital: [facility] for COVID & Afib. The patient was admitted on 11/30/20 and discharged on 12/10/20.. Our office called the patient within 48 hours of discharge and scheduled the follow-up appointment.. During the patient's hospital stay the patient was treated by  Dr Xuan Randall (hospitalist) and Dr Aamir Yañez (cardiology) and Dr Samir Rossi (cardiology).  [facility] [phone];         HPI:       Mary is being seen today for follow up after her recent stay at [place].  She was admitted due to weakness and confusion.  She was diagnosed with COVID ultimately.  She says that she is doing okay with regard to COVID.  She is breathing fairly well.  She denies any fever.  Mary is staying with her daughter right now.  She is getting along fairly well at this time.  Home health has been seeing her.          She also developed a fib while in [place].  This is a new diagnosis for her for which she was placed on Eliquis and metoprolol.  She is tolerating those medications fairly well.  She does have follow up scheduled with cardiology just after the first of the year.          Additionally, she presents with history of hTN - Essential (primary) hypertension.  her current cardiac  medication regimen includes a beta-blocker ( Toprol-XL ) and an ACE inhibitor ( Lotensin ).  She has not kept a blood pressure diary, but states that pressures have been okay.  She is tolerating the medication well without side effects.  Compliance with treatment has been good; she takes her medication as directed and follows up as directed.            Mary did have some issue with renal function initially in [place].  Her levels had returned to baseline by discharge.  She is drinking adequately now and making decent urine.          Mary also has type 2 diabetes for which she remains on glimepiride.  She did have an A1C of around 7.5% while in [place].  Prior to hospital stay, she was taking insulin regularly.    ROS:     CONSTITUTIONAL:  Negative for chills and fever.      CARDIOVASCULAR:  Negative for chest pain and palpitations.      RESPIRATORY:  Negative for recent cough and dyspnea.      GASTROINTESTINAL:  Negative for abdominal pain, nausea and vomiting.      INTEGUMENTARY/BREAST:  Negative for atypical mole(s) and rash.          Past Medical History / Family History / Social History:         Last Reviewed on 12/18/2020 09:16 AM by Kory Gardner    Past Medical History:             PREVENTIVE HEALTH MAINTENANCE             BONE DENSITY: was last done 4/24/2019 with normal results     COLORECTAL CANCER SCREENING: Up to date (colonoscopy q10y; sigmoidoscopy q5y; Cologuard q3y) was last done 6/14/16, Results are in chart; colonoscopy with the following abnormalities noted-- tubular adenoma x 2; The next colonoscopy is due  6/2019     DENTAL CLEANING: Refuses due to fear     EYE EXAM: Diabetic Eye Exam during this calendar year and results are in chart was last done 9/23/19 NO diabetic retinopathy     MAMMOGRAM: Done within last 2 years and results in are chart was last done 7/9/2020 with normal results     PAP SMEAR: was last done 12/19/13 with normal results No longer indicated due to age and history          PAST MEDICAL HISTORY         Positive for    Hyperlipidemia and    Hypertension;     Positive for    Pulmonary Hypertension and    RAD;     Postive for    Gastritis and    Esophageal ulcers;     Positive for    Hematuria with malrotation Rt kidney;     Positive for    DDD L-spine, Rt gluteal tendonitis and    Lumbar Radiculitis and L4-L5 Spondylosis '15;     Positive for    Type 2 Diabetes and    Hypothyroidism;     Positive for    Cataract: bilateral; declines surgery; and    Prominent Rt hepatic lobe '15;         CURRENT MEDICAL PROVIDERS:    Allergist: Dr Everett    Cardiologist: Dr Rae-not seen since 2015    Dermatologist: Dr Baumann    General Surgeon: Dr Spencer    Ophthalmologist: Dr Cowan             ADVANCE DIRECTIVES: Living will         Surgical History:         Positive for    Hysterectomy: Abdominal; Partial; ;     Positive for    Trochanter bursa injection '10;,    L-sine disc and cyst removal '10 (L4-5 and L5-S1);,    L-spine epidural; and    Cystoscopy '05;;         Family History: NO colon, NO Breast, NO Ovarian, NO Prostate Cancer;  CAD;  CVA;         Social History: Laurence Chopra's mom     Occupation:    Retired     Marital Status:          Tobacco/Alcohol/Supplements:     Last Reviewed on 12/18/2020 09:16 AM by Kory Gardner    Tobacco: She has a past history of cigarette smoking; quit date:  Quit Date 1990.  No Etoh;         Substance Abuse History:     Last Reviewed on 12/18/2020 09:16 AM by Kory Gardner        Mental Health History:     Last Reviewed on 12/18/2020 09:16 AM by Kory Gardner        Communicable Diseases (eg STDs):     Last Reviewed on 12/18/2020 09:16 AM by Kory Gardner        Current Problems:     Last Reviewed on 12/18/2020 09:16 AM by Kory Gardner    Neoplasm of uncertain behavior of colon    Hypothyroidism, unspecified    Type 2 diabetes mellitus with hyperglycemia    Morbid (severe) obesity due to excess  calories    HTN - Essential (primary) hypertension    Moderate persistent asthma with (acute) exacerbation    Pain in unspecified hip    Low back pain    Other long term (current) drug therapy    Gout, unspecified    Other fatigue    HLD - Mixed hyperlipidemia    Age-related cognitive decline    Hemangioma unspecified site    Allergic rhinitis, unspecified    Encounter for screening mammogram for malignant neoplasm of breast    Encounter for screening for depression    Encounter for general adult medical examination without abnormal findings    Hematuria, unspecified    Diverticulosis of large intestine without perforation or abscess without bleeding    Migraine, unspecified, not intractable, without status migrainosus    Primary pulmonary hypertension    Allergic rhinitis    Moderate persistent asthma, uncomplicated    Acute maxillary sinusitis, unspecified    Encounter for immunization    Acute upper respiratory infection, unspecified    Cough    Encounter for follow-up examination after completed treatment for conditions other than malignant neoplasm    COVID-19    Unspecified atrial fibrillation        Immunizations:     influenza, high-dose, quadrivalent (FLUZONE HIGH-DOSE QUAD 2020-21) 9/22/2020    Td adult 7/1/2008    Prevnar 13 (Pneumococcal PCV 13) 9/28/2016    Fluzone High-Dose pf (>=65 yr) 9/28/2016    Fluzone High-Dose pf (>=65 yr) 11/8/2017    Fluzone High-Dose pf (>=65 yr) 10/15/2018    Fluzone High-Dose pf (>=65 yr) 10/14/2019    PNEUMOVAX 23 (Pneumococcal PPV23) 2/1/2008    Zostavax (Zoster live) 9/1/2012        Allergies:     Last Reviewed on 12/18/2020 09:16 AM by Kory Gardner    Penicillins:      Sulfonamides:      Keflex:      Claritin:      shell fish -muscles:      Monopril:      Serevent (discontinued, use Serevent Diskus):      Avelox:      Crestor: joint pain     Banana:          Current Medications:     Last Reviewed on 12/18/2020 09:16 AM by Kory Gardner    Eliquis 5 mg  "oral tablet [take 1 tablet (5 mg) by oral route 2 times per day]    metoprolol succinate 100 mg oral Tablet, Extended Release 24 hr [take 1 tablet (100 mg) by oral route twice daily]    glimepiride 4 mg oral tablet [TAKE ONE TABLET BY MOUTH TWICE A DAY]    benazepriL 40 mg oral tablet [TAKE 1/2 TABLET (20mg) BY MOUTH DAILY]    omeprazole 20 mg oral capsule,delayed release (enteric coated) [TAKE ONE CAPSULE BY MOUTH DAILY]    Mucinex 600 mg oral Tablet,Extended Release 12 hr [1-2 tabs daily PRN]    aspirin 81 mg oral tablet, delayed release (enteric coated) [1 tab daily]    Vitamin D3 1,000 unit oral capsule [2 daily]    Fish Oil 300-1,000 mg oral capsule [1 / day]    montelukast 10 mg oral tablet [TAKE ONE TABLET BY MOUTH DAILY]    metFORMIN 500 mg oral tablet [TAKE TWO TABLETS BY MOUTH EVERY MORNING AND TAKE ONE TABLET BY MOUTH EVERY EVENING]    Fluticasone Propionate 50mcg/1actuation Nasal Spray [1 or 2 sprays in each nostril qday ]    Tricor 48 mg oral tablet [TAKE ONE TABLET BY MOUTH DAILY]    Zetia 10 mg oral tablet [1 tab daily (Pt Asst)]    BD Ultra-Fine 6mm Needle 31G Insulin Syringe 0.3ml Syringe [Use with insulin TID and prn Dx E11.9]    allergy injections every 2-7 days     OTC Xyzal 10mg Take one tablet once daily      ketotifen fumarate 0.025 % (0.035 %) ophthalmic (eye) Drops [1 drop to both eyes bid]    BD Veo Insulin Syringe Ultra-Fine 0.3 mL 31 gauge x 15/64\"  [USE WITH INSULIN THREE TIMES A DAY AND AS NEEDED]    FreeStyle Lite Strips  [CHECK BLOOD SUGAR THREE TIMES A DAY]    allopurinoL 300 mg oral tablet [TAKE ONE TABLET BY MOUTH DAILY]    levothyroxine 75 mcg oral tablet [take 1 tablet (75 mcg) by oral route once daily]        Objective:        Exams:     PHYSICAL EXAM:     GENERAL: well developed, well nourished;  no apparent distress;     EYES: extraocular movements intact; conjunctiva and cornea are normal;     RESPIRATORY: normal respiratory rate and pattern with no distress;     LYMPHATIC: " no enlargement of cervical or facial nodes; no supraclavicular nodes;     BREAST/INTEGUMENT: SKIN: no significant rashes or lesions; no suspicious moles;     NEUROLOGIC: mental status: alert and oriented x 3; cranial nerves II-XII grossly intact;     PSYCHIATRIC: appropriate affect and demeanor; normal psychomotor function;         Lab/Test Results: H&P, labs, D/C summary from [place] reviewed.        Assessment:         U07.1   COVID-19       I48.91   Unspecified atrial fibrillation       I10   HTN - Essential (primary) hypertension       N17.9   Acute kidney failure, unspecified       E11.65   Type 2 diabetes mellitus with hyperglycemia           Plan:         COVID-19        RECOMMENDATIONS given include: Today, we have reviewed her care.  She is making fairly good progress. She is still somewhat weak, but she is moving in a better direction.  I have encouraged her to continue to work with home health presently.  We will see how things progress..          Unspecified atrial fibrillationShe should follow up with cardiology as scheduled.  Cautions regarding Eliquis are discussed.        HTN - Essential (primary) hypertensionHer pressures have been doing okay.  She is back on benazepril.        Acute kidney failure, unspecifiedHer most recent kidney functions at [place] were normalizing.  I'm not planning to make changes at this time.          Type 2 diabetes mellitus with hyperglycemiaHer A1C in [place] is noted.  I have advised that she continue current care and monitor sugars.  If the sugar starts moving back up, then resumption of insulin would be reasonable.  We will follow.            Charge Capture:         Primary Diagnosis:     U07.1  COVID-19           Orders:      [id number]  Transitional care manage service 14 day discharge  (In-House)              I48.91  Unspecified atrial fibrillation     I10  HTN - Essential (primary) hypertension     N17.9  Acute kidney failure, unspecified     E11.65  Type 2 diabetes  mellitus with hyperglycemia

## 2021-05-18 NOTE — PROGRESS NOTES
Mary Chopra  1940     Office/Outpatient Visit    Visit Date: Tue, Jan 12, 2021 02:00 pm    Provider: Sakina Donohue MD (Assistant: Jing Rene,  )    Location: [facility]        Electronically signed by Sakina Donohue MD on  01/15/2021 05:17:07 PM                             Subjective:        CC: Mrs. Chopra is a 80 year old White female.  short of breath, had covid in hospital until 12/10/26, r, has; shortness of breath for one week        HPI: Mary reports that she started feeling bad back in November.  She stopped eating and stopped using her insulin.  After a week or two, she went to [facility] where she was diagnosed with COVID.  She was hospitalized right at the end of November till 12/10/20.  She was discharged at that time after being diagnosed with atrial fibrillation.  She was started on Eliquis.  She stayed with Laurence until 12/26, and then she went home.  Last night, she woke up one night and could not breathe.  She felt very panicky.  Since then, she has intermittent SOB continuously.  She is afraid that this may be related to her COVID.  She wonders if the Eliquis might be responsible, or possibly due to missing 8 weeks' worth of allergy shots.  She also is concerned that there is an anxiety component.    ROS:     CONSTITUTIONAL:  Positive for fatigue.   Negative for chills or fever.      EYES:  Negative for blurred vision.      E/N/T:  Negative for diminished hearing and nasal congestion.      CARDIOVASCULAR:  Negative for chest pain and palpitations.      RESPIRATORY:  Positive for dyspnea.   Negative for recent cough or frequent wheezing.      GASTROINTESTINAL:  Negative for abdominal pain, constipation, diarrhea, nausea and vomiting.      GENITOURINARY:  Negative for dysuria and urinary incontinence.      MUSCULOSKELETAL:  Negative for arthralgias and myalgias.          Past Medical History / Family History / Social History:         Last Reviewed on  1/12/2021 02:19 PM by Sakina Donohue    Past Medical History:             PREVENTIVE HEALTH MAINTENANCE             BONE DENSITY: was last done 4/24/2019 with normal results     COLORECTAL CANCER SCREENING: Up to date (colonoscopy q10y; sigmoidoscopy q5y; Cologuard q3y) was last done 6/14/16, Results are in chart; colonoscopy with the following abnormalities noted-- tubular adenoma x 2; The next colonoscopy is due  6/2019     DENTAL CLEANING: Refuses due to fear     EYE EXAM: Diabetic Eye Exam during this calendar year and results are in chart was last done 9/23/19 NO diabetic retinopathy     MAMMOGRAM: Done within last 2 years and results in are chart was last done 7/9/2020 with normal results     PAP SMEAR: was last done 12/19/13 with normal results No longer indicated due to age and history         PAST MEDICAL HISTORY         Positive for    Hyperlipidemia and    Hypertension;     Positive for    Pulmonary Hypertension and    RAD;     Postive for    Gastritis and    Esophageal ulcers;     Positive for    Hematuria with malrotation Rt kidney;     Positive for    DDD L-spine, Rt gluteal tendonitis and    Lumbar Radiculitis and L4-L5 Spondylosis '15;     Positive for    Type 2 Diabetes and    Hypothyroidism;     Positive for    Cataract: bilateral; declines surgery; and    Prominent Rt hepatic lobe '15;         CURRENT MEDICAL PROVIDERS:    Allergist: Dr Everett    Cardiologist: Dr Rae-not seen since 2015    Dermatologist: Dr Baumann    General Surgeon: Dr Spencer    Ophthalmologist: Dr Cowan             ADVANCE DIRECTIVES: Living will         Surgical History:         Positive for    Hysterectomy: Abdominal; Partial; ;     Positive for    Trochanter bursa injection '10;,    L-sine disc and cyst removal '10 (L4-5 and L5-S1);,    L-spine epidural; and    Cystoscopy '05;;         Family History: NO colon, NO Breast, NO Ovarian, NO Prostate Cancer;  CAD;  CVA;         Social History: Laurence Chopra's mom      Occupation:    Retired     Marital Status:          Tobacco/Alcohol/Supplements:     Last Reviewed on 1/12/2021 02:19 PM by Sakina Donohue    Tobacco: She has a past history of cigarette smoking; quit date:  Quit Date 1990.  No Etoh;         Substance Abuse History:     Last Reviewed on 1/12/2021 02:19 PM by Sakina Donohue        Mental Health History:     Last Reviewed on 1/12/2021 02:19 PM by Sakina Donohue        Communicable Diseases (eg STDs):     Last Reviewed on 1/12/2021 02:19 PM by Sakina Donohue        Current Problems:     Last Reviewed on 12/18/2020 09:16 AM by Kory Gardner    Neoplasm of uncertain behavior of colon    Hypothyroidism, unspecified    Type 2 diabetes mellitus with hyperglycemia    Morbid (severe) obesity due to excess calories    HTN - Essential (primary) hypertension    Moderate persistent asthma with (acute) exacerbation    Pain in unspecified hip    Low back pain    Other long term (current) drug therapy    Gout, unspecified    Other fatigue    HLD - Mixed hyperlipidemia    Age-related cognitive decline    Hemangioma unspecified site    Allergic rhinitis, unspecified    Encounter for screening mammogram for malignant neoplasm of breast    Encounter for screening for depression    Hematuria, unspecified    Encounter for general adult medical examination without abnormal findings    Diverticulosis of large intestine without perforation or abscess without bleeding    Primary pulmonary hypertension    Migraine, unspecified, not intractable, without status migrainosus    Allergic rhinitis    Moderate persistent asthma, uncomplicated    Acute maxillary sinusitis, unspecified    Encounter for immunization    Cough    Acute upper respiratory infection, unspecified    Encounter for follow-up examination after completed treatment for conditions other than malignant neoplasm    COVID-19    Acute kidney failure, unspecified    Unspecified atrial fibrillation    Encounter for  other administrative examinations        Immunizations:     influenza, high-dose, quadrivalent (FLUZONE HIGH-DOSE QUAD 2020-21) 9/22/2020    Td adult 7/1/2008    Prevnar 13 (Pneumococcal PCV 13) 9/28/2016    Fluzone High-Dose pf (>=65 yr) 9/28/2016    Fluzone High-Dose pf (>=65 yr) 11/8/2017    Fluzone High-Dose pf (>=65 yr) 10/15/2018    Fluzone High-Dose pf (>=65 yr) 10/14/2019    PNEUMOVAX 23 (Pneumococcal PPV23) 2/1/2008    Zostavax (Zoster live) 9/1/2012        Allergies:     Last Reviewed on 12/18/2020 09:16 AM by Kory Gardner    Penicillins:      Sulfonamides:      Keflex:      Claritin:      shell fish -muscles:      Monopril:      Serevent (discontinued, use Serevent Diskus):      Avelox:      Crestor: joint pain     Banana:          Current Medications:     Last Reviewed on 12/18/2020 09:16 AM by Kory Gardner    Eliquis 5 mg oral tablet [take 1 tablet (5 mg) by oral route 2 times per day]    metoprolol succinate 100 mg oral Tablet, Extended Release 24 hr [take 1 tablet (100 mg) by oral route twice daily]    benazepriL 40 mg oral tablet [TAKE 1/2 TABLET (20mg) BY MOUTH DAILY]    omeprazole 20 mg oral capsule,delayed release (enteric coated) [TAKE ONE CAPSULE BY MOUTH DAILY]    montelukast 10 mg oral tablet [TAKE ONE TABLET BY MOUTH DAILY]    glimepiride 4 mg oral tablet [TAKE ONE TABLET BY MOUTH TWICE A DAY]    Mucinex 600 mg oral Tablet,Extended Release 12 hr [1-2 tabs daily PRN]    aspirin 81 mg oral tablet, delayed release (enteric coated) [1 tab daily]    Vitamin D3 1,000 unit oral capsule [2 daily]    Fish Oil 300-1,000 mg oral capsule [1 / day]    Fluticasone Propionate 50mcg/1actuation Nasal Spray [1 or 2 sprays in each nostril qday ]    metFORMIN 500 mg oral tablet [TAKE TWO TABLETS BY MOUTH EVERY MORNING AND TAKE ONE TABLET BY MOUTH EVERY EVENING]    Zetia 10 mg oral tablet [1 tab daily (Pt Asst)]    Tricor 48 mg oral tablet [TAKE ONE TABLET BY MOUTH DAILY]    BD Ultra-Fine 6mm  "Needle 31G Insulin Syringe 0.3ml Syringe [Use with insulin TID and prn Dx E11.9]    allergy injections every 2-7 days     OTC Xyzal 10mg Take one tablet once daily      ketotifen fumarate 0.025 % (0.035 %) ophthalmic (eye) Drops [1 drop to both eyes bid]    BD Veo Insulin Syringe Ultra-Fine 0.3 mL 31 gauge x 15/64\"  [USE WITH INSULIN THREE TIMES A DAY AND AS NEEDED]    FreeStyle Lite Strips  [CHECK BLOOD SUGAR THREE TIMES A DAY]    allopurinoL 300 mg oral tablet [TAKE ONE TABLET BY MOUTH DAILY]    levothyroxine 75 mcg oral tablet [take 1 tablet (75 mcg) by oral route once daily]        Objective:        Vitals:         Current: 1/12/2021 2:06:59 PM    Ht:  5 ft, 1.5 in;  Wt: 198.8 lbs;  BMI: 37.0T: 97.3 F (temporal);  BP: 237/86 mm Hg (right arm, sitting);  P: 67 bpm (left arm (BP Cuff), sitting);  sCr: 1.1 mg/dL;  GFR: 45.54        Repeat:     2:9:21 PM  BP:   232/80mm Hg (left arm, sitting, pulse 66) 3:26:48 PM  BP:   220/59mm Hg (right arm, sitting)     Exams:     PHYSICAL EXAM:     GENERAL: vital signs recorded - well developed, well nourished;  well groomed;  no apparent distress;     EYES: extraocular movements intact; conjunctiva and cornea are normal; PERRLA;     E/N/T: OROPHARYNX:  normal mucosa, dentition, gingiva, and posterior pharynx;     RESPIRATORY: normal respiratory rate and pattern with no distress; normal breath sounds with no rales, rhonchi, wheezes or rubs;     CARDIOVASCULAR: normal rate; rhythm is irregularly irregular;  no systolic murmur; no edema;     GASTROINTESTINAL: nontender; normal bowel sounds;     MUSCULOSKELETAL: normal gait; normal overall tone         Assessment:         R06.02   Shortness of breath       F41.9   Anxiety disorder, unspecified       I10   HTN - Essential (primary) hypertension       J30.9   Allergic rhinitis, unspecified           ORDERS:         Meds Prescribed:       [New Rx] amLODIPine 5 mg oral tablet [take 1 tablet (5 mg) by oral route once daily], #30 " (thirty) tablets, Refills: 2 (two)       [New Rx] busPIRone 5 mg oral tablet [take 1 tablet (5 mg) by oral route 2 times per day prn anxiety], #60 (sixty) tablets, Refills: 2 (two)         Radiology/Test Orders:       [id number]  Radiologic exam chest 2 views  (Send-Out)            3017F  Colorectal CA screen results documented and reviewed (PV)  (In-House)            86554  Pro services for allergen immunotherapy not including provision of allergenic extracts; 2+ injection  (In-House)    serum expires 7-31-21, pt on Metoprolol./pr          Lab Orders:       [id number]  NTBNP - [facility] B-Type Natriurectic peptide  (Send-Out)            32739  BDCBC - [facility] CBC with 3 part diff  (Send-Out)            [id number]  COMP - [facility] Comp. Metabolic Panel  (Send-Out)              Other Orders:         Screening mammogram results documented  (Send-Out)            1124F  Advance Care Planning discussed and doc in MR; no surrogate named or advance care plan provided  (Send-Out)                      Plan:         Shortness of breathShortness of breath of unclear origin.  Unlikely to be COVID long hauler syndrome, as she had a period in which she was symptom-free for 2 weeks while staying with her daughter.  SOB began after she returned to her own home after Deepak.  Checking labs and CXR as below to evaluate for underlying cause.  I will also treat her with buspirone for potential anxiety component.  She feels quite certain that anxiety is at least partially to blame.          45 min was spent in this encounter as well as reviewing chart and discussing history and coordination of care with other providers and nursing staff.    LABORATORY:  Labs ordered to be performed today include BNP, CBC, and Comprehensive metabolic panel.      RADIOLOGY:  I have ordered a chest x-ray (PA and lateral) to be done today.  MIPS Vaccines Flu and Pneumonia updated in Shot record Screening mammomgram done within last 2 years and results in are chart Colorectal  Cancer Screening is up to date and the results are in the chart Advance Directive/Surrogate Decision Maker discussed and pt declines to complete today     FOLLOW-UP: Keep currently scheduled appointment.:.            Orders:       [id number]  NTBNP - [facility] B-Type Natriurectic peptide  (Send-Out)            91320  BDCBC - [facility] CBC with 3 part diff  (Send-Out)            83978  COMP - [facility] Comp. Metabolic Panel  (Send-Out)            [id number]  Radiologic exam chest 2 views  (Send-Out)              Screening mammogram results documented  (Send-Out)            3017F  Colorectal CA screen results documented and reviewed (PV)  (In-House)            1124F  Advance Care Planning discussed and doc in MR; no surrogate named or advance care plan provided  (Send-Out)              Anxiety disorder, unspecified          Prescriptions:       [New Rx] busPIRone 5 mg oral tablet [take 1 tablet (5 mg) by oral route 2 times per day prn anxiety], #60 (sixty) tablets, Refills: 2 (two)         HTN - Essential (primary) hypertensionBP is running quite high today, even on recheck.  Adding amlodipine to her benazepril and metoprolol succinate to see if we can bring this down. She is asymptomatic today, except for the SOB, and the SOB improved after she had been sitting in the exam room talking for some time (although the BP failed to respond).          Prescriptions:       [New Rx] amLODIPine 5 mg oral tablet [take 1 tablet (5 mg) by oral route once daily], #30 (thirty) tablets, Refills: 2 (two)         Allergic rhinitis, unspecified          Orders:       [id number]  Pro services for allergen immunotherapy not including provision of allergenic extracts; 2+ injection  (In-House)    serum expires 7-31-21, pt on Metoprolol./pr              Patient Recommendations:        For  Shortness of breath:                    APPOINTMENT INFORMATION:        Monday Tuesday Wednesday Thursday Friday Saturday Sunday            Time:___________________AM  PM    Date:_____________________             Charge Capture:         Primary Diagnosis:     R06.02  Shortness of breath           Orders:      06930  Office/outpatient visit; established patient, level 4  (In-House)            3017F  Colorectal CA screen results documented and reviewed (PV)  (In-House)              F41.9  Anxiety disorder, unspecified     I10  HTN - Essential (primary) hypertension     J30.9  Allergic rhinitis, unspecified           Orders:      [id number]  Pro services for allergen immunotherapy not including provision of allergenic extracts; 2+ injection  (In-House)    serum expires 7-31-21, pt on Metoprolol./pr

## 2021-05-18 NOTE — PROGRESS NOTES
Mary Chopra 1940     Office/Outpatient Visit    Visit Date: Wed, Jun 12, 2019 01:35 pm    Provider: Sakina Donohue MD (Assistant: Lisset Gan MA)    Location: [facility]        Electronically signed by Sakina Donohue MD on  06/12/2019 04:27:59 PM                             SUBJECTIVE:        CC:     Mrs. Chopra is a 78 year old White female.  Patient presents today for three month follow up;         HPI: Mary is here today for routine f/u on chronic issues.          She is on metformin, glimepiride, and Humulin-N for diabetes.  She previously followed with Roslyn Tovar.  She been checking BG at home.  She has only had one episode of hypoglycemia since her last visit.  She is on ASA and an ACEI.  She has not been able to tolerate statins but is on Zetia for HLD.  She is UTD on eye exam, last done 8/2018.  She is UTD on foot exam (9/2018).  Last A1c was 7.5 back in March.        She is on benazepril, metoprolol succinate, and doxazosin for HTN.  No CP, SOB, palpitations.        She is on levothyroxine for hypothyroidism.  No heat/cold intolerance, no changes in hair or skin.        She is on Singulair, Flonase, and azelastine for allergies.  She has chronic congestion.        She is on prn hydrocodone/APAP for left shoulder pain.  She has been on oxycodone/APAP in the past but has weaned down to the Norco.  She uses #30 tabs every 6 months or so and last got a refill in Dec.  Tox screen in March was appropriate.  She is on allopurinol for gout.  No recent flares.     ROS:     CONSTITUTIONAL:  Positive for unintentional weight gain ( gradual ).   Negative for fatigue or fever.      EYES:  Negative for blurred vision.      E/N/T:  Positive for nasal congestion and frequent rhinorrhea.   Negative for ear pain or sore throat.      CARDIOVASCULAR:  Negative for chest pain and palpitations.      RESPIRATORY:  Negative for recent cough and dyspnea.      GASTROINTESTINAL:  Negative for  constipation, diarrhea, nausea and vomiting.      MUSCULOSKELETAL:  Positive for arthralgias and back pain.   Negative for myalgias.      INTEGUMENTARY/BREAST:  Positive for extremely dry skin.      NEUROLOGICAL:  Negative for paresthesias and weakness.          PMH/FMH/SH:     Last Reviewed on 6/12/2019 01:44 PM by Sakina Donohue    Past Medical History:             PREVENTIVE HEALTH MAINTENANCE             BONE DENSITY: was last done 4/24/2019 with normal results     COLORECTAL CANCER SCREENING: Up to date (colonoscopy q10y; sigmoidoscopy q5y; Cologuard q3y) was last done 6/14/16, Results are in chart; colonoscopy with the following abnormalities noted-- tubular adenoma x 2; The next colonoscopy is due  6/2019     DENTAL CLEANING: Refuses due to fear     EYE EXAM: Diabetic Eye Exam during this calendar year and results are in chart was last done 8/24/2018 NO diabetic retinopathy     MAMMOGRAM: Done within last 2 years and results in are chart was last done 4/24/19 with normal results     PAP SMEAR: was last done 12/19/13 with normal results No longer indicated due to age and history         PAST MEDICAL HISTORY         Positive for    Hyperlipidemia and    Hypertension;     Positive for    Pulmonary Hypertension and    RAD;     Postive for    Gastritis and    Esophageal ulcers;     Positive for    Hematuria with malrotation Rt kidney;     Positive for    DDD L-spine, Rt gluteal tendonitis and    Lumbar Radiculitis and L4-L5 Spondylosis '15;     Positive for    Type 2 Diabetes and    Hypothyroidism;     Positive for    Prominent Rt hepatic lobe '15;         CURRENT MEDICAL PROVIDERS:    Allergist: Dr Everett    Cardiologist: Dr Rae-not seen since 2015    Dermatologist: Dr Baumann    General Surgeon: Dr Spencer    Ophthalmologist: Dr Cowan             ADVANCE DIRECTIVES: Living will         Surgical History:         Positive for    Hysterectomy: Abdominal; Partial; ;     Positive for    Trochanter bursa injection  '10;,    L-sine disc and cyst removal '10 (L4-5 and L5-S1);,    L-spine epidural; and    Cystoscopy '05;;         Family History: NO colon, NO Breast, NO Ovarian, NO Prostate Cancer;  CAD;  CVA;         Social History:     Occupation:    Retired     Marital Status:          Tobacco/Alcohol/Supplements:     Last Reviewed on 6/12/2019 01:44 PM by Sakina Donohue    Tobacco: She has a past history of cigarette smoking; quit date:  Quit Date 1990.  No Etoh;         Substance Abuse History:     Last Reviewed on 6/12/2019 01:44 PM by Sakina Donohue        Mental Health History:     Last Reviewed on 6/12/2019 01:44 PM by Sakina Donohue        Communicable Diseases (eg STDs):     Last Reviewed on 6/12/2019 01:44 PM by Sakina Donohue            Current Problems:     Last Reviewed on 3/18/2019 08:56 AM by Sakina Donohue    Hemangioma, unspecified site     Memory loss NOS     Hyperlipidemia     Fatigue     Gout, unspecified     Joint pain, multiple sites     Patient visit for long term (current) drug use; other     Chronic low back pain     Hip pain     Asthma     Migraine, unspecified, without mention of intractable migraine without mention of status migrainosus     Type 2 DM     Adenomatous colon polyps     Hypothyroidism     Morbid obesity     Pulmonary HTN     Diverticulosis     Hematuria, unspecified     HTN     Allergic rhinitis     Other specified administrative purpose         Immunizations:     Td adult 7/1/2008     Prevnar 13 (Pneumococcal PCV 13) 9/28/2016     Fluzone High-Dose pf (>=65 yr) 9/28/2016     Fluzone High-Dose pf (>=65 yr) 11/8/2017     Fluzone High-Dose pf (>=65 yr) 10/15/2018     PNEUMOVAX 23 (Pneumococcal PPV23) 2/1/2008     Zostavax (Zoster live) 9/1/2012         Allergies:     Last Reviewed on 3/18/2019 08:56 AM by Sakina Donohue    Penicillins:    Sulfonamides:    Keflex:    Claritin:    Monopril:    Serevent (discontinued, use Serevent Diskus):    Avelox:    Crestor: joint pain         Current Medications:     Last Reviewed on 3/18/2019 08:56 AM by Sakina Donohue 10 10mEq Tablets, Extended Release Take 1 tablet(s) by mouth daily     Glucose Reagent Blood Test Strips  Reagent Strips Check blood sugar three times daily     Metoprolol Succinate 50mg Tablets, Extended Release Take 1 tablet(s) by mouth daily     Metformin HCl 500mg Tablet 2 po q am and 1 po q evening     BD Insulin Syringe 1ml Syringe 0.3mL 31G 6mm syringe use with insulin TID and PRN DxE11.9     Doxazosin Mesylate 8mg Tablet Take 1 tablet(s) by mouth daily     Glimepiride 4mg Tablet Take 1 tablet(s) by mouth bid     Humulin N 100units/1ml Cartridges Inject 16 units QAM and 12 units QHS PLUS sliding scale insulin, 1 unit for BG >175, 2 units >200, 3 units >225 (max 6 units daily)     Synthroid 0.05mg Tablet 1 tablet Monday-Friday, then 1.5 tablets Saturday and Sunday     Tricor 48mg Tablet Take 1 tablet(s) by mouth daily     Benazepril HCl 40mg Tablet Take 1 tablet(s) by mouth daily     Albuterol 0.083% Nebulizer Solution 1 vial q 4 hours prn     Trazodone HCl 50mg Tablet 1 tab HS PRN     Montelukast Sodium 10mg Tablet 1 tab daily     Omeprazole 20mg Tablets, Delayed Release 1 capsule daily     Hydrocodone/Acetaminophen 7.5mg/325mg Tablet 1 po qd prn     Torsemide 20mg Tablet Take 1 tablet(s) by mouth daily     BD Ultra-Fine 6mm Needle 31G Insulin Syringe 0.3ml Syringe Use with insulin TID and prn Dx E11.9     Fluticasone Propionate 50mcg/1actuation Nasal Spray 1 or 2 sprays in each nostril qday     Zetia 10mg Tablet 1 tab daily (Pt Asst)     Advair Diskus     Ketotifen 0.025% Ophthalmic Solution 1 drop to both eyes bid     OTC Xyzal 10mg Take one tablet once daily     allergy injections every 2-7 days     Fish Oil 1,000mg Softgel capsule 1 / day     Vitamin D3 1,000IU Capsules 2 daily     Aspirin (ASA) 81mg Tablets, Enteric Coated 1 tab daily     Mucinex 600mg Tablets, Extended Release 1-2 tabs daily PRN          OBJECTIVE:        Vitals:         Current: 6/12/2019 1:39:56 PM    Ht:  5 ft, 1.5 in;  Wt: 220.4 lbs;  BMI: 41.0    T: 97.9 F (oral);  BP: 147/46 mm Hg (left arm, sitting);  P: 65 bpm (left arm (BP Cuff), sitting);  sCr: 1.02 mg/dL;  GFR: 52.93        Repeat:     2:04:46 PM     BP:   132/64mm Hg (left arm, sitting)         Exams:     PHYSICAL EXAM:     GENERAL: vital signs recorded - well developed, well nourished;  well groomed;  no apparent distress;     EYES: extraocular movements intact; conjunctiva and cornea are normal; PERRLA;     E/N/T: OROPHARYNX:  normal mucosa, dentition, gingiva, and posterior pharynx;     RESPIRATORY: normal respiratory rate and pattern with no distress; normal breath sounds with no rales, rhonchi, wheezes or rubs;     CARDIOVASCULAR: normal rate; rhythm is regular;  no systolic murmur; no edema;     GASTROINTESTINAL: nontender; normal bowel sounds;     MUSCULOSKELETAL: normal gait; normal overall tone         Lab/Test Results:             Hemoglobin A1c:  7.4 (06/12/2019),     Performed by::  pr (06/12/2019),             Procedures:     Allergic rhinitis     Allergy Injection (2 or more) expires: 04/18/20 on metoprolol ael             ASSESSMENT           250.00   E11.65  Type 2 DM              DDx:     401.1   I10  HTN              DDx:     272.4   E78.2  Hyperlipidemia              DDx:     244.9   E03.9  Hypothyroidism              DDx:     724.2   M54.5  Chronic low back pain              DDx:     477.9   J30.9  Allergic rhinitis              DDx:         ORDERS:         Radiology/Test Orders:       3014F  Screening mammography results documented and reviewed (PV)1  (In-House)         3017F  Colorectal CA screen results documented and reviewed (PV)  (In-House)         2022F  Dilated retinal eye exam w/interpret by ophthalmologist/optometrist documented/reviewed (DM)4  (In-House)         43783  Professional services for allergen immunotherapy not including provision of allergenic  extracts; two  (In-House)           Lab Orders:       APPTO  Appointment need  (In-House)         07206*  Hgb A1c fast lab  (In-House)           Procedures Ordered:       82529  Collection of capillary blood specimen (eg, finger, heel, ear stick)  (In-House)                   PLAN:          Type 2 DM She is on metformin, glimepiride, and Humulin-N for diabetes.  No refills needed.  She previously followed with Roslyn Tovar.  She been checking BG at home.  She has only had one episode of hypoglycemia since her last visit.  She is on ASA and an ACEI.  She has not been able to tolerate statins but is on Zetia for HLD.  She is UTD on eye exam, last done 8/2018.  She is UTD on foot exam (9/2018).  Last A1c was 7.5 back in March.  RTC 3 months.     LABORATORY:  Labs ordered to be performed today include Hgb A1c inhouse fast lab.  MIPS Vaccines Flu and Pneumonia updated in Shot record Screening mammomgram done within last 2 years and results in are chart Colorectal Cancer Screening is up to date and the results are in the chart   Diabetic Eye Exam during this calendar year and results are in chart     FOLLOW-UP: Schedule a follow-up visit in 3 months..  f/u DM with [place]           Orders:       3014F  Screening mammography results documented and reviewed (PV)1  (In-House)         3017F  Colorectal CA screen results documented and reviewed (PV)  (In-House)         2022F  Dilated retinal eye exam w/interpret by ophthalmologist/optometrist documented/reviewed (DM)4  (In-House)         APPTO  Appointment need  (In-House)         48148*  Hgb A1c fast lab  (In-House)         85049  Collection of capillary blood specimen (eg, finger, heel, ear stick)  (In-House)            HTN BP at goal for age.  No refills needed today.  Labs reviewed and UTD.          Hyperlipidemia Unable to tolerate statins but is on Zeta.  No refills needed.          Hypothyroidism Stable.  No refills needed.  Labs reviewed and UTD.          Chronic low  back pain She is stable on her current regimen.  Pain is well controlled.  No adverse effects.  She does require ongoing use of this controlled substance to function.  Prior tox screen appropriate.  Bran run today.  No refills needed today.     Controlled substance documentation: Bran reviewed; prior drug screen consistent; consent is reviewed and signed and on the chart.  She is aware of risk of addiction on this medication, understands that she will need to follow up for a review every 3 months and her medications will be adjusted or decreased as deemed appropriate at each visit.  No history of drug or alcohol abuse.  No concerns about diversion or abuse. She denies side effects related to the medication.  She is aware that she may be called in for pill counts.  The dosing of this medication will be reviewed on a regular basis and reduced if possible..  Ongoing use of a controlled substance is necessary for this patient to have a normal quality of life          Allergic rhinitis           Orders:       10623  Professional services for allergen immunotherapy not including provision of allergenic extracts; two  (In-House)               Patient Recommendations:        For  Type 2 DM:     Schedule a follow-up visit in 3 months.                APPOINTMENT INFORMATION:        Monday Tuesday Wednesday Thursday Friday Saturday Sunday            Time:___________________AM  PM   Date:_____________________             CHARGE CAPTURE           **Please note: ICD descriptions below are intended for billing purposes only and may not represent clinical diagnoses**        Primary Diagnosis:         250.00 Type 2 DM            E11.65    Type 2 diabetes mellitus with hyperglycemia              Orders:          62723   Office/outpatient visit; established patient, level 4  (In-House)             3014F   Screening mammography results documented and reviewed (PV)1  (In-House)             3017F   Colorectal CA screen results  documented and reviewed (PV)  (In-House)             2022F   Dilated retinal eye exam w/interpret by ophthalmologist/optometrist documented/reviewed (DM)4  (In-House)             APPTO   Appointment need  (In-House)             57122*   Hgb A1c fast lab  (In-House)             13162   Collection of capillary blood specimen (eg, finger, heel, ear stick)  (In-House)           401.1 HTN            I10    Essential (primary) hypertension    272.4 Hyperlipidemia            E78.2    Mixed hyperlipidemia    244.9 Hypothyroidism            E03.9    Hypothyroidism, unspecified    724.2 Chronic low back pain            M54.5    Low back pain    477.9 Allergic rhinitis            J30.9    Allergic rhinitis, unspecified              Orders:          37368   Professional services for allergen immunotherapy not including provision of allergenic extracts; two  (In-House)

## 2021-05-18 NOTE — PROGRESS NOTES
Mary Chopra 1940     Office/Outpatient Visit    Visit Date: Tue, Oct 9, 2018 04:03 pm    Provider: Rosalee Rene N.P. (Assistant: Cynthia Elizalde MA)    Location: [facility]        Electronically signed by Rosalee Rene N.P. on  10/09/2018 05:43:23 PM                             SUBJECTIVE:        CC:     Mrs. Chopra is a 77 year old White female.  She presents with Left ear pain/swollen & clogged.          HPI:         Ear pain noted.      Mrs. Chopra complains of left ear pain.  Associated symptoms include diminished hearing and itcy ears.  She denies ear drainage or fever.  The symptoms began 1-2 weeks ago.  She has already tried to relieve the symptoms with OTC ear drops.      ROS:     CONSTITUTIONAL:  Negative for chills, fatigue, fever, and weight change.      E/N/T:  Negative for nasal congestion.      CARDIOVASCULAR:  Negative for chest pain, palpitations, tachycardia, orthopnea, and edema.      RESPIRATORY:  Negative for cough, dyspnea, and hemoptysis.          [facility]/:     Last Reviewed on 10/09/2018 04:47 PM by Rosalee Rene    Past Medical History:             PREVENTIVE HEALTH MAINTENANCE             BONE DENSITY: was last done 4/7/16 with normal results     COLORECTAL CANCER SCREENING: Up to date (colonoscopy q10y; sigmoidoscopy q5y; Cologuard q3y) was last done 6/14/16, Results are in chart; colonoscopy with the following abnormalities noted-- tubular adenoma x 2; The next colonoscopy is due  6/2019     DENTAL CLEANING: Refuses due to fear     EYE EXAM: Diabetic Eye Exam during this calendar year and results are in chart was last done 8/24/2018 NO diabetic retinopathy     MAMMOGRAM: Done within last 2 years and results in are chart was last done 4/12/18 with normal results     PAP SMEAR: was last done 12/19/13 with normal results No longer indicated due to age and history         PAST MEDICAL HISTORY         Positive for    Hyperlipidemia and     Hypertension;     Positive for    Pulmonary Hypertension and    RAD;     Postive for    Gastritis and    Esophageal ulcers;     Positive for    Hematuria with malrotation Rt kidney;     Positive for    DDD L-spine, Rt gluteal tendonitis and    Lumbar Radiculitis and L4-L5 Spondylosis '15;     Positive for    Type 2 Diabetes and    Hypothyroidism;     Positive for    Prominent Rt hepatic lobe '15;         CURRENT MEDICAL PROVIDERS:    Allergist: Dr Everett    Cardiologist: Dr Rae-not seen since 2015    Dermatologist: Dr Baumann    General Surgeon: Dr Spencer    Ophthalmologist: Dr Cowan             ADVANCE DIRECTIVES: Living will         Surgical History:         Positive for    Hysterectomy: Abdominal; Partial; ;     Positive for    Trochanter bursa injection '10;,    L-sine disc and cyst removal '10 (L4-5 and L5-S1);,    L-spine epidural; and    Cystoscopy '05;;         Family History: NO colon, NO Breast, NO Ovarian, NO Prostate Cancer;  CAD;  CVA;         Social History:     Occupation:    Retired     Marital Status:          Tobacco/Alcohol/Supplements:     Last Reviewed on 10/09/2018 04:06 PM by Cynthia Elizalde    Tobacco: She has a past history of cigarette smoking; quit date:  Quit Date 1990.  No Etoh;         Substance Abuse History:     Last Reviewed on 9/12/2018 12:05 PM by Sakina Donohue        Mental Health History:     Last Reviewed on 9/12/2018 12:05 PM by Sakina Donohue        Communicable Diseases (eg STDs):     Last Reviewed on 9/12/2018 12:05 PM by Sakina Donohue            Immunizations:     Td adult 7/1/2008     Prevnar 13 (Pneumococcal PCV 13) 9/28/2016     Fluzone High-Dose pf (>=65 yr) 9/28/2016     Fluzone High-Dose pf (>=65 yr) 11/8/2017     PNEUMOVAX 23 (Pneumococcal PPV23) 2/1/2008     Zostavax (Zoster) 9/1/2012         Allergies:     Last Reviewed on 10/09/2018 04:47 PM by Rosalee Rene    Penicillins:    Sulfonamides:    Keflex:    Claritin:    Monopril:    Serevent  (discontinued, use Serevent Diskus):    Avelox:    Crestor: joint pain    shell fish -muscles:    Banana:        Current Medications:     Last Reviewed on 10/09/2018 04:47 PM by Rosalee Rene    Synthroid 0.05mg Tablet 1 tablet Monday-Friday, then 1.5 tablets Saturday and Sunday     Benazepril HCl 40mg Tablet Take 1 tablet(s) by mouth daily     Humulin N 100units/1ml Injection 15 units QAM and 13 units QHS PLUS sliding scale insulin, 1 unit for BG >175, 2 units >200, 3 units >225 (max 6 units daily),     Klor-Con 10 10mEq Tablets, Extended Release Take 1 tablet(s) by mouth daily     Allopurinol 300mg Tablet 1 tab daily     Metformin HCl 500mg Tablet 2 po q am and 1 po q evening     Doxazosin Mesylate 8mg Tablet Take 1 tablet(s) by mouth daily     Glimepiride 4mg Tablet Take 1 tablet(s) by mouth bid     Tricor 48mg Tablet Take 1 tablet(s) by mouth daily     Levothyroxine Sodium 50mcg Capsules 1 tab M-F 1 1/2 tab Sat and Sun     Omeprazole 20mg Tablets, Delayed Release 1 capsule daily     Torsemide 20mg Tablet Take 1 tablet(s) by mouth daily     Hydrocodone/Acetaminophen 7.5mg/325mg Tablet 1 po qd prn     Metoprolol Succinate 50mg Tablets, Extended Release Take 1 tablet(s) by mouth daily     BD Ultra-Fine 6mm Needle 31G Insulin Syringe 0.3ml Syringe Use with insulin TID and prn Dx E11.9     Montelukast Sodium 10mg Tablet 1 tab daily     Fluticasone Propionate 50mcg/1actuation Nasal Spray 1 or 2 sprays in each nostril qday     Zetia 10mg Tablet 1 tab daily (Pt Asst)     Breo Ellipta 100mcg/25mcg Inhalation Powder Take 1 inhalation(s) by mouth daily     Fish Oil 1,000mg Softgel capsule 1 / day     Vitamin D3 1,000IU Capsules 2 daily     Aspirin (ASA) 81mg Tablets, Enteric Coated 1 tab daily     Mucinex 600mg Tablets, Extended Release 1-2 tabs daily PRN     Ketotifen 0.025% Ophthalmic Solution 1 drop to both eyes bid     OTC Xyzal 10mg Take one tablet once daily     allergy injections every 2-7 days          OBJECTIVE:        Vitals:         Current: 10/9/2018 4:12:46 PM    Ht:  5 ft, 1.5 in;  Wt: 224.8 lbs;  BMI: 41.8    T: 98.1 F (oral);  BP: 157/51 mm Hg (right arm, sitting);  P: 60 bpm (right arm (BP Cuff), sitting);  sCr: 0.92 mg/dL;  GFR: 60.08        Exams:     PHYSICAL EXAM:     GENERAL:  well developed and nourished; appropriately groomed; in no apparent distress;     E/N/T: EARS: external auditory canal edematous on the left;  right TM is normal;  pain with insertion of ear speculum on left, and only partial visualization of left TM, what was seen was clear,  mild erythema of canal; NOSE: no sinus tenderness; OROPHARYNX: posterior pharynx, including tonsils, tongue, and uvula are normal;     RESPIRATORY: normal respiratory rate and pattern with no distress; no wheezes;     CARDIOVASCULAR: normal rate; rhythm is regular;  no systolic murmur;     LYMPHATIC: no enlargement of cervical or facial nodes;     PSYCHIATRIC:  appropriate affect and demeanor; normal speech pattern; grossly normal memory;         ASSESSMENT           380.10   H60.392  Otitis externa              DDx:         PLAN:          Otitis externa         RECOMMENDATIONS given include: Take medications as prescribed.      FOLLOW-UP: Schedule follow-up appointment in 4 days..            Prescriptions: Neomycin/Polymyxin B/Hydrocortisone 3gtts 3x/day for 7 days             Patient Recommendations:        For  Otitis externa:     Schedule a follow-up visit in 4 days.              CHARGE CAPTURE           **Please note: ICD descriptions below are intended for billing purposes only and may not represent clinical diagnoses**        Primary Diagnosis:         380.10 Otitis externa            H60.392    Other infective otitis externa, left ear              Orders:          45391   Office/outpatient visit; established patient, level 3  (In-House)

## 2021-05-18 NOTE — PROGRESS NOTES
Mary Chopra 1940     Office/Outpatient Visit    Visit Date: Mon, Mar 11, 2019 01:45 pm    Provider: Sakina Donohue MD (Assistant: Lisset Gan MA)    Location: [facility]        Electronically signed by Sakina Donohue MD on  03/11/2019 05:03:47 PM                             SUBJECTIVE:        CC:     Mrs. Chopra is a 78 year old White female.  Patient presents today for physical exam. (not taking Allopurinol);         HPI:     She is UTD on colonoscopy, last done 6/2016 and this showed tubular adenoma x 2.  Repeat in 3 years recommended, which will be this June.  Pap smear no longer indicated by age and history; last done 12/2013 and this was normal.  She is UTD on mammogram, last done 4/2018 and this was normal.  She is due for DEXA, last done 4/2016 and this was normal.  Repeat tickled for 4/2019.  She is UTD on Pneumovax (2/2008), Prevnar (9/2016), Zostavax (9/2012), and flu (10/2018).  She is due for Shingrix, Havrix, Td.  She is due for routine labs including A1c.  She is UTD on eye exam (8/2018).  She is UTD on foot exam (9/2018).          She is on hydroccodone/APAP just rarely for breakthrough generalized OA and chronic low back pain.  This works pretty well.  She tries not to use it unless she really needs to.  No adverse effects.      Mrs. Chopra is here for a Medicare wellness visit.  ADVANCED DIRECTIVES: None     Returning to health checkup, the required HRA questions are integrated within this visit note. Family medical history and individual medical/surgical history were reviewed and updated.  A current height, weight, BMI, blood pressure, and pulse were recorded in the vitals section of the note and have been reviewed. Patient's medications, including supplements, were recorded in the chart and reviewed.  Current providers and suppliers were reviewed and updated.          Self-Assessment of Health: She rates her health as fair. She rates her confidence of being  able to control/manage most of her health problems as not very confident. Her physical/emotional health has limited her social activites not at all.  A review of cognitive impairment was performed, including ability to drive a car, manage finances, and any memory changes, and was found to be negative.  A review of functional ability, including bathing, dressing, walking, and urine/bowel continence as well as level of safety was performed and was found to be negative.  Falls Risk: Has not had any falls or only one fall without injury in the past year.  In regard to hearing, she reports having trouble hearing the TV/radio when others do not and having to strain to hear or understand conversations, but not wearing hearing aid(s).  Concerning home safety, she reports that at home she DOES have adequate lighting, a skid resistant shower/tub, handrails on stairs, functioning smoke alarms and absence of throw rugs, but not grab bars in the bath.          Immunization Status: ** >10 years since last Td booster; Physical Activity: She never excercises.; Type of diet patient normally eats is described as well-balanced with fruits and vegetables Preventative Health updated today         PHQ-9 Depression Screening: Completed form scanned and in chart; Total Score 8 Alcohol Consumption Screening: Completed form scanned and in chart; Total Score 0     ROS:     CONSTITUTIONAL:  Positive for unintentional weight gain ( gradual ).   Negative for fatigue or fever.      EYES:  Negative for blurred vision.      E/N/T:  Positive for nasal congestion and frequent rhinorrhea.   Negative for ear pain or sore throat.      CARDIOVASCULAR:  Negative for chest pain and palpitations.      RESPIRATORY:  Negative for recent cough and dyspnea.      GASTROINTESTINAL:  Positive for now taking centrum silver and magnesium.   Negative for constipation, diarrhea, nausea or vomiting.      GENITOURINARY:  Negative for dysuria and urinary incontinence.       MUSCULOSKELETAL:  Positive for arthralgias and back pain.   Negative for myalgias.      INTEGUMENTARY/BREAST:  Negative for atypical mole(s) and rash.      NEUROLOGICAL:  Negative for paresthesias and weakness.      PSYCHIATRIC:  Positive for insomnia.   Negative for anxiety or depression.          PMH/FMH/SH:     Last Reviewed on 3/11/2019 02:03 PM by Sakina Donohue    Past Medical History:             PREVENTIVE HEALTH MAINTENANCE             BONE DENSITY: was last done 4/7/16 with normal results     COLORECTAL CANCER SCREENING: Up to date (colonoscopy q10y; sigmoidoscopy q5y; Cologuard q3y) was last done 6/14/16, Results are in chart; colonoscopy with the following abnormalities noted-- tubular adenoma x 2; The next colonoscopy is due  6/2019     DENTAL CLEANING: Refuses due to fear     EYE EXAM: Diabetic Eye Exam during this calendar year and results are in chart was last done 8/24/2018 NO diabetic retinopathy     MAMMOGRAM: Done within last 2 years and results in are chart was last done 4/12/18 with normal results     PAP SMEAR: was last done 12/19/13 with normal results No longer indicated due to age and history         PAST MEDICAL HISTORY         Positive for    Hyperlipidemia and    Hypertension;     Positive for    Pulmonary Hypertension and    RAD;     Postive for    Gastritis and    Esophageal ulcers;     Positive for    Hematuria with malrotation Rt kidney;     Positive for    DDD L-spine, Rt gluteal tendonitis and    Lumbar Radiculitis and L4-L5 Spondylosis '15;     Positive for    Type 2 Diabetes and    Hypothyroidism;     Positive for    Prominent Rt hepatic lobe '15;         CURRENT MEDICAL PROVIDERS:    Allergist: Dr Everett    Cardiologist: Dr Rae-not seen since 2015    Dermatologist: Dr Baumann    General Surgeon: Dr Spencer    Ophthalmologist: Dr Cowan             ADVANCE DIRECTIVES: Living will         Surgical History:         Positive for    Hysterectomy: Abdominal; Partial; ;      Positive for    Trochanter bursa injection '10;,    L-sine disc and cyst removal '10 (L4-5 and L5-S1);,    L-spine epidural; and    Cystoscopy '05;;         Family History: NO colon, NO Breast, NO Ovarian, NO Prostate Cancer;  CAD;  CVA;         Social History:     Occupation:    Retired     Marital Status:          Tobacco/Alcohol/Supplements:     Last Reviewed on 3/11/2019 02:03 PM by Sakina Donohue    Tobacco: She has a past history of cigarette smoking; quit date:  Quit Date 1990.  No Etoh;         Substance Abuse History:     Last Reviewed on 3/11/2019 02:03 PM by Sakina Donohue        Mental Health History:     Last Reviewed on 3/11/2019 02:03 PM by Sakina Donohue        Communicable Diseases (eg STDs):     Last Reviewed on 3/11/2019 02:03 PM by Sakina Donohue            Current Problems:     Last Reviewed on 12/10/2018 01:01 PM by Sakina Donohue    Hemangioma, unspecified site     Memory loss NOS     Hyperlipidemia     Fatigue     Gout, unspecified     Joint pain, multiple sites     Patient visit for long term (current) drug use; other     Chronic low back pain     Hip pain     Asthma     Migraine, unspecified, without mention of intractable migraine without mention of status migrainosus     Type 2 DM     Adenomatous colon polyps     Hypothyroidism     Morbid obesity     Pulmonary HTN     Diverticulosis     Hematuria, unspecified     HTN     Allergic rhinitis     Other specified administrative purpose         Immunizations:     Td adult 7/1/2008     Prevnar 13 (Pneumococcal PCV 13) 9/28/2016     Fluzone High-Dose pf (>=65 yr) 9/28/2016     Fluzone High-Dose pf (>=65 yr) 11/8/2017     Fluzone High-Dose pf (>=65 yr) 10/15/2018     PNEUMOVAX 23 (Pneumococcal PPV23) 2/1/2008     Zostavax (Zoster live) 9/1/2012         Allergies:     Last Reviewed on 12/10/2018 01:01 PM by Sakina Donohue    Penicillins:    Sulfonamides:    Keflex:    Claritin:    Monopril:    Serevent (discontinued, use Serevent  Diskus):    Avelox:    Crestor: joint pain        Current Medications:     Last Reviewed on 12/10/2018 01:01 PM by Sakina Donohue    Metformin HCl 500mg Tablet 2 po q am and 1 po q evening     Montelukast Sodium 10mg Tablet 1 tab daily     Synthroid 0.05mg Tablet 1 tablet Monday-Friday, then 1.5 tablets Saturday and Sunday     Glimepiride 4mg Tablet Take 1 tablet(s) by mouth bid     Tricor 48mg Tablet Take 1 tablet(s) by mouth daily     Omeprazole 20mg Tablets, Delayed Release 1 capsule daily     Benazepril HCl 40mg Tablet Take 1 tablet(s) by mouth daily     Hydrocodone/Acetaminophen 7.5mg/325mg Tablet 1 po qd prn     Torsemide 20mg Tablet Take 1 tablet(s) by mouth daily     Klor-Con 10 10mEq Tablets, Extended Release Take 1 tablet(s) by mouth daily     Metoprolol Succinate 50mg Tablets, Extended Release Take 1 tablet(s) by mouth daily     Allopurinol 300mg Tablet 1 tab daily     Humulin N 100units/1ml Injection 15 units QAM and 13 units QHS PLUS sliding scale insulin, 1 unit for BG >175, 2 units >200, 3 units >225 (max 6 units daily),     Doxazosin Mesylate 8mg Tablet Take 1 tablet(s) by mouth daily     Levothyroxine Sodium 50mcg Capsules 1 tab M-F 1 1/2 tab Sat and Sun     BD Ultra-Fine 6mm Needle 31G Insulin Syringe 0.3ml Syringe Use with insulin TID and prn Dx E11.9     Fluticasone Propionate 50mcg/1actuation Nasal Spray 1 or 2 sprays in each nostril qday     Zetia 10mg Tablet 1 tab daily (Pt Asst)     Breo Ellipta 100mcg/25mcg Inhalation Powder Take 1 inhalation(s) by mouth daily     Ketotifen 0.025% Ophthalmic Solution 1 drop to both eyes bid     OTC Xyzal 10mg Take one tablet once daily     allergy injections every 2-7 days     Fish Oil 1,000mg Softgel capsule 1 / day     Vitamin D3 1,000IU Capsules 2 daily     Aspirin (ASA) 81mg Tablets, Enteric Coated 1 tab daily     Mucinex 600mg Tablets, Extended Release 1-2 tabs daily PRN         OBJECTIVE:        Vitals:         Current: 3/11/2019 1:53:00 PM    Ht:  5  ft, 1.5 in;  Wt: 226.2 lbs;  BMI: 42.0    T: 97.5 F (oral);  BP: 155/59 mm Hg (left arm, sitting);  P: 74 bpm (right arm (BP Cuff), sitting);  sCr: 1.02 mg/dL;  GFR: 53.52    VA: 20/40 OD, 20/40 OS (near, with correction)        Exams:     PHYSICAL EXAM:     GENERAL: vital signs recorded - well developed, well nourished;  well groomed;  no apparent distress;     EYES: extraocular movements intact; conjunctiva and cornea are normal; PERRLA;     E/N/T: OROPHARYNX:  normal mucosa, dentition, gingiva, and posterior pharynx;     RESPIRATORY: normal respiratory rate and pattern with no distress; normal breath sounds with no rales, rhonchi, wheezes or rubs;     CARDIOVASCULAR: normal rate; rhythm is regular;  no systolic murmur; no edema;     GASTROINTESTINAL: nontender; normal bowel sounds;     LYMPHATIC: no enlargement of cervical or facial nodes;     MUSCULOSKELETAL: normal gait; normal overall tone     NEUROLOGIC: mental status: alert and oriented x 3; Reflexes: brachioradialis: 1+; knee jerks: 1+;     PSYCHIATRIC: appropriate affect and demeanor; normal psychomotor function; normal speech pattern;         Lab/Test Results:             Hemoglobin A1c:  7.5 (03/11/2019),     Performed by::  atc (03/11/2019),     Urine temperature:  confirm (03/11/2019),     All urine drug screen levels confirmed negative:  yes (03/11/2019),     Date and time of last pill:  hydrocodone 1 week ago/AS (03/11/2019),     Performed by:  atc (03/11/2019),     Collection Time:  1440 (03/11/2019),             ASSESSMENT           V70.0   Z00.00  Health checkup              DDx:     719.49   M25.50  Joint pain, multiple sites              DDx:     V58.69   Z79.891   Z79.899  Patient visit for long term (current) drug use; other              DDx:     250.00   E11.65  Type 2 DM              DDx:     V79.0   Z13.89  Screening for depression              DDx:         ORDERS:         Meds Prescribed:       Trazodone HCl 50mg Tablet 1 tab HS PRN  #30  (Thirty) tablet(s) Refills: 2       Glucose Reagent Blood Test Strips (Glucose Reagent Blood Test Strips) Reagent Strips Check blood sugar three times daily  #300 ([place]) strip(s) Refills: 5         Radiology/Test Orders:       3014F  Screening mammography results documented and reviewed (PV)1  (In-House)         3017F  Colorectal CA screen results documented and reviewed (PV)  (In-House)         2022F  Dilated retinal eye exam w/interpret by ophthalmologist/optometrist documented/reviewed (DM)4  (In-House)           Lab Orders:       APPTO  Appointment need  (In-House)         [id number]*  Hgb A1c fast lab  (In-House)         [id number]  Drug test prsmv qual dir optical obs per day  (In-House)           Procedures Ordered:         Annual wellness visit, includes a PPPS, subsequent visit  (In-House)           Other Orders:       1101F  Pt screen for fall risk; document no falls in past year or only 1 fall w/o injury in past year (MAYRA)  (In-House)           Depression screen negative  (In-House)           Negative EtOH screen  (In-House)                   PLAN:          Health checkup She is UTD on colonoscopy, last done 6/2016 and this showed tubular adenoma x 2.  Repeat in 3 years recommended, which will be this June.  Pap smear no longer indicated by age and history; last done 12/2013 and this was normal.  She is UTD on mammogram, last done 4/2018 and this was normal.  She is due for DEXA, last done 4/2016 and this was normal.  Repeat tickled for 4/2019.  She is UTD on Pneumovax (2/2008), Prevnar (9/2016), Zostavax (9/2012), and flu (10/2018).  She is due for Shingrix, Havrix, Td; can be done at pharmacy.  She is due for routine labs including A1c; fingerstick A1c ordered today..  She is UTD on eye exam (8/2018).  She is UTD on foot exam (9/2018).  No fall risk, no memory issues, no signs/symptoms of depression.  She lives alone.  She is able to drive and perform ADLs/manage finances independently.   Hearing is adequate.  She does not have a living will; information given today on how to obtain one.  Preventive services handout and safety handout were given to her.  Current doctor list updated.  RTC 3 months.     MIPS PHQ-9 Depression Screening Completed form scanned and in chart; Total Score 8 Negative alcohol screen     MAMMOGRAM: Done within last 2 years and results in are chart     COLORECTAL CANCER SCREENING: Results are in chart     FOLLOW-UP: Schedule a follow-up visit in 3 months..  f/u DM with [place]           Prescriptions:       Trazodone HCl 50mg Tablet 1 tab HS PRN  #30 (Thirty) tablet(s) Refills: 2           Orders:         Annual wellness visit, includes a PPPS, subsequent visit  (In-House)         1101F  Pt screen for fall risk; document no falls in past year or only 1 fall w/o injury in past year (MAYRA)  (In-House)         3014F  Screening mammography results documented and reviewed (PV)1  (In-House)         3017F  Colorectal CA screen results documented and reviewed (PV)  (In-House)         2022F  Dilated retinal eye exam w/interpret by ophthalmologist/optometrist documented/reviewed (DM)4  (In-House)         APPTO  Appointment need  (In-House)           Depression screen negative  (In-House)           Negative EtOH screen  (In-House)             Patient Education Handouts:       Physical Exam 60+ year, Female           Joint pain, multiple sites Stable on prn Norco.  No adverse effects.  Sx are well controlled.  She does require ongoing use of this controlled substance to function.  Tox screen and Bran run today.          Patient visit for long term (current) drug use; other     Controlled substance documentation: Bran reviewed; drug screen performed and appropriate; consent is reviewed and signed and on the chart.  She is aware of risk of addiction on this medication, understands that she will need to follow up for a review every 3 months and her medications will be  adjusted or decreased as deemed appropriate at each visit.  No history of drug or alcohol abuse.  No concerns about diversion or abuse. She denies side effects related to the medication.  She is aware that she may be called in for pill counts.  The dosing of this medication will be reviewed on a regular basis and reduced if possible..  Ongoing use of a controlled substance is necessary for this patient to have a normal quality of life           Orders:       [id number]  Drug test prsmv qual dir optical obs per day  (In-House)            Type 2 DM She is having hypoglycemia down to the 40s overnight.  Backing off on nighttime Humulin N insulin from 15 units to 10 units QHS.  Continue 18 units QAM.  Checking A1c today.     LABORATORY:  Labs ordered to be performed today include Hgb A1c inhouse fast lab.            Prescriptions:       Glucose Reagent Blood Test Strips (Glucose Reagent Blood Test Strips) Reagent Strips Check blood sugar three times daily  #300 ([facility] [place]) strip(s) Refills: 5           Orders:       [id number]*  Hgb A1c fast lab  (In-House)               Patient Recommendations:        For  Health checkup:     Schedule a follow-up visit in 3 months.                APPOINTMENT INFORMATION:        Monday Tuesday Wednesday Thursday Friday Saturday Sunday            Time:___________________AM  PM   Date:_____________________             CHARGE CAPTURE           **Please note: ICD descriptions below are intended for billing purposes only and may not represent clinical diagnoses**        Primary Diagnosis:         V70.0 Health checkup            Z00.00    Encounter for general adult medical examination without abnormal findings              Orders:             Annual wellness visit, includes a PPPS, subsequent visit  (In-House)             1101F   Pt screen for fall risk; document no falls in past year or only 1 fall w/o injury in past year (MAYRA)  (In-House)             3014F   Screening mammography  results documented and reviewed (PV)1  (In-House)             3017F   Colorectal CA screen results documented and reviewed (PV)  (In-House)             2022F   Dilated retinal eye exam w/interpret by ophthalmologist/optometrist documented/reviewed (DM)4  (In-House)             APPTO   Appointment need  (In-House)                Depression screen negative  (In-House)                Negative EtOH screen  (In-House)           719.49 Joint pain, multiple sites            M25.50    Pain in unspecified joint    V58.69 Patient visit for long term (current) drug use; other            Z79.891    Long term (current) use of opiate analgesic           Z79.899    Other long term (current) drug therapy              Orders:          [id number]   Drug test prsmv qual dir optical obs per day  (In-House)           250.00 Type 2 DM            E11.65    Type 2 diabetes mellitus with hyperglycemia              Orders:          [id number]*   Hgb A1c fast lab  (In-House)           V79.0 Screening for depression            Z13.89    Encounter for screening for other disorder

## 2021-05-18 NOTE — PROGRESS NOTES
Mary Chopra  1940     Office/Outpatient Visit    Visit Date: Mon, Nov 23, 2020 01:26 pm    Provider: Felipe Cueto MD (Assistant: Jhon Espinoza, )    Location: [facility]        Electronically signed by Felipe Cueto MD on  11/23/2020 02:06:43 PM                             Subjective:        CC: Mrs. Chopra is a 80 year old White female.  presents today due to coughing since friday, fever since saturday phone call [phone]        HPI:     TELEMEDICINE VISIT:    - Patient consented to telemedicine visit and billing    - Persons on the call include:  myself, patient, daughter    - The call was conducted via:[employer] phone          Has had fever for three days. Temp up to 102.  She is coughing but no sputum. Cough is calming down.Hasn't been out of the house for past week and half. Hasn't even been around her daughter until today.Appetite is poor. I could hear her daughter coughing in the background. No change in sense of taste or smell. Did have a little diarrhea. Does not feel any more soa than usual. Does think she is feeling better. Does feel a little weak.  Legs can  feel trimbly when walks to kitchen.      She is a diabetic and says that her blood sugars have been doing fine.  She says she has had diabetes long enough she knows what to do in that regard.    ROS:     CONSTITUTIONAL:  Positive for chills, fatigue and fever.      EYES:  Positive for blurred vision.      CARDIOVASCULAR:  Negative for chest pain, orthopnea, paroxysmal nocturnal dyspnea and pedal edema.      RESPIRATORY:  Positive for recent cough and dyspnea.      GASTROINTESTINAL:  Positive for diarrhea.   Negative for abdominal pain, constipation, nausea or vomiting.      GENITOURINARY:  Negative for dysuria and frequent urination.      NEUROLOGICAL:  Positive for weakness.      PSYCHIATRIC:  Negative for anxiety, depression, and sleep disturbances.          Past Medical History / Family History /  Social History:         Last Reviewed on 9/22/2020 03:30 PM by Sakina Donohue    Past Medical History:             PREVENTIVE HEALTH MAINTENANCE             BONE DENSITY: was last done 4/24/2019 with normal results     COLORECTAL CANCER SCREENING: Up to date (colonoscopy q10y; sigmoidoscopy q5y; Cologuard q3y) was last done 6/14/16, Results are in chart; colonoscopy with the following abnormalities noted-- tubular adenoma x 2; The next colonoscopy is due  6/2019     DENTAL CLEANING: Refuses due to fear     EYE EXAM: Diabetic Eye Exam during this calendar year and results are in chart was last done 9/23/19 NO diabetic retinopathy     MAMMOGRAM: Done within last 2 years and results in are chart was last done 7/9/2020 with normal results     PAP SMEAR: was last done 12/19/13 with normal results No longer indicated due to age and history         PAST MEDICAL HISTORY         Positive for    Hyperlipidemia and    Hypertension;     Positive for    Pulmonary Hypertension and    RAD;     Postive for    Gastritis and    Esophageal ulcers;     Positive for    Hematuria with malrotation Rt kidney;     Positive for    DDD L-spine, Rt gluteal tendonitis and    Lumbar Radiculitis and L4-L5 Spondylosis '15;     Positive for    Type 2 Diabetes and    Hypothyroidism;     Positive for    Cataract: bilateral; declines surgery; and    Prominent Rt hepatic lobe '15;         CURRENT MEDICAL PROVIDERS:    Allergist: Dr Everett    Cardiologist: Dr Rae-not seen since 2015    Dermatologist: Dr Baumann    General Surgeon: Dr Spencer    Ophthalmologist: Dr Cowan             ADVANCE DIRECTIVES: Living will         Surgical History:         Positive for    Hysterectomy: Abdominal; Partial; ;     Positive for    Trochanter bursa injection '10;,    L-sine disc and cyst removal '10 (L4-5 and L5-S1);,    L-spine epidural; and    Cystoscopy '05;;         Family History: NO colon, NO Breast, NO Ovarian, NO Prostate Cancer;  CAD;  CVA;         Social  History: Laurence Chopra's mom     Occupation:    Retired     Marital Status:          Tobacco/Alcohol/Supplements:     Last Reviewed on 9/22/2020 03:30 PM by Sakina Donohue    Tobacco: She has a past history of cigarette smoking; quit date:  Quit Date 1990.  No Etoh;         Substance Abuse History:     Last Reviewed on 9/22/2020 03:30 PM by Sakina Donohue        Mental Health History:     Last Reviewed on 9/22/2020 03:30 PM by Sakina Donohue        Communicable Diseases (eg STDs):     Last Reviewed on 9/22/2020 03:30 PM by Sakina Donohue        Allergies:     Last Reviewed on 9/22/2020 03:30 PM by Sakina Donohue    Penicillins:      Sulfonamides:      Keflex:      Claritin:      shell fish -muscles:      Monopril:      Serevent (discontinued, use Serevent Diskus):      Avelox:      Crestor: joint pain     Banana:          Current Medications:     Last Reviewed on 9/22/2020 03:30 PM by Sakina Donohue    Klor-Con 10 10 mEq oral tablet, extended release [TAKE ONE TABLET BY MOUTH DAILY]    torsemide 20 mg oral tablet [TAKE ONE TABLET BY MOUTH DAILY]    benazepriL 40 mg oral tablet [TAKE ONE TABLET BY MOUTH DAILY]    omeprazole 20 mg oral capsule,delayed release (enteric coated) [TAKE ONE CAPSULE BY MOUTH DAILY]    Mucinex 600 mg oral Tablet,Extended Release 12 hr [1-2 tabs daily PRN]    aspirin 81 mg oral tablet, delayed release (enteric coated) [1 tab daily]    Vitamin D3 1,000 unit oral capsule [2 daily]    Fish Oil 300-1,000 mg oral capsule [1 / day]    traZODone 50 mg oral tablet [1 tab HS PRN]    montelukast 10 mg oral tablet [TAKE ONE TABLET BY MOUTH DAILY]    glimepiride 4 mg oral tablet [TAKE ONE TABLET BY MOUTH TWICE A DAY]    doxazosin 8 mg oral tablet [TAKE 1 TABLET BY MOUTH DAILY]    Fluticasone Propionate 50mcg/1actuation Nasal Spray [1 or 2 sprays in each nostril qday ]    metFORMIN 500 mg oral tablet [TAKE TWO TABLETS BY MOUTH EVERY MORNING AND TAKE ONE TABLET BY MOUTH EVERY EVENING]     "Tricor 48 mg oral tablet [TAKE ONE TABLET BY MOUTH DAILY]    Zetia 10 mg oral tablet [1 tab daily (Pt Asst)]    HYDROcodone-acetaminophen 7.5-325 mg oral tablet [1 po qd prn]    HumuLIN N NPH U-100 Insulin 100 unit/mL subcutaneous Suspension [INJECT 18 UNITS UNDER THE SKIN EVERY MORNING AND INJECT 10 UNITS UNDER THE SKIN EVERY NIGHT AT BEDTIME]    HumuLIN N NPH U-100 Insulin 100 unit/mL subcutaneous Suspension [INJECT 18 UNITS UNDER THE SKIN EVERY MORNING AND INJECT 10 UNITS UNDER THE SKIN EVERY NIGHT AT BEDTIME]    BD Ultra-Fine 6mm Needle 31G Insulin Syringe 0.3ml Syringe [Use with insulin TID and prn Dx E11.9]    allergy injections every 2-7 days     OTC Xyzal 10mg Take one tablet once daily      ketotifen fumarate 0.025 % (0.035 %) ophthalmic (eye) Drops [1 drop to both eyes bid]    BD Veo Insulin Syringe Ultra-Fine 0.3 mL 31 gauge x 15/64\"  [USE WITH INSULIN THREE TIMES A DAY AND AS NEEDED]    Advair Diskus     FreeStyle Lite Strips  [CHECK BLOOD SUGAR THREE TIMES A DAY]    Glucose Reagent Blood Test Strips  Reagent Strips [check blood sugar QID  DX E11.9]    albuterol sulfate 2.5 mg /3 mL (0.083 %) Inhalation Solution for Nebulization [1 vial q 4 hours prn]    metoprolol succinate 50 mg oral Tablet, Extended Release 24 hr [TAKE ONE TABLET BY MOUTH DAILY]    allopurinoL 300 mg oral tablet [TAKE ONE TABLET BY MOUTH DAILY]    levothyroxine 75 mcg oral tablet [take 1 tablet (75 mcg) by oral route once daily]        Objective:        Exams:     Telehealth visit via telephone.  Patient's voice sounded strong.  There is no evidence of respiratory issues, no cough or congestion audible.  Mental health seem to be good.  Had good insight into the coronavirus issues.            Assessment:         J06.9   Acute upper respiratory infection, unspecified       R05   Cough       E11.65   Type 2 diabetes mellitus with hyperglycemia       J45.40   Moderate persistent asthma, uncomplicated           ORDERS:         Meds " Prescribed:       [New Rx] Zithromax Z-Jorge 250 mg oral tablet [take 2 initially then 1 tablet (250 mg) by oral route once daily], #6 (six) tablets, Refills: 0 (zero)                 Plan:         Acute upper respiratory infection, unspecifiedGiven precautions to wear mask and stay physically distant from others 6 feet or more.  She says that the daughter who is with her today and she they are both wearing mask at present.    Telehealth: Verbal consent obtained for visit to occur via phone call; Total time spent was 14 minutes; [id number]--Qesdolrgw E/M 11-20 minutes         CoughPatient course has symptoms that could be consistent with Covid.  Only thing is she says she has not been around anybody for least a week and a half.  Offered to have her come in for Covid testing and she declined.  We will go ahead and send her in a prescription for Zithromax in case she has bacterial bronchitis issue.  Continue to use nebulizer treatments and inhalers as prescribed.  Did emphasize that her symptoms could be Covid that symptoms could change abruptly if she were to develop significant shortness of breath, chest pain, or noticed blueness around the lips she should seek medical care immediately          Prescriptions:       [New Rx] Zithromax Z-Jorge 250 mg oral tablet [take 2 initially then 1 tablet (250 mg) by oral route once daily], #6 (six) tablets, Refills: 0 (zero)         Type 2 diabetes mellitus with hyperglycemiaContinue to follow blood sugars closely            Other Patient Education Handouts:     Dragon transcription disclaimer:        Much of this encounter note is an electronic transcription/translation of spoken language to printed text.  The electronic translation of spoken language may permit erroneous, or at times, nonsensical words or phrases to be inadvertently transcribed.  Although I have reviewed the note for such errors, some may still exist.        Charge Capture:         Primary Diagnosis:     J06.9  Acute  upper respiratory infection, unspecified           Orders:      30698  Phys/QHP telephone evaluation 11-20 minutes  (In-House)              R05  Cough     E11.65  Type 2 diabetes mellitus with hyperglycemia     J45.40  Moderate persistent asthma, uncomplicated

## 2021-05-18 NOTE — PROGRESS NOTES
Mary Chopra 1940     Office/Outpatient Visit    Visit Date: Fri, Sep 13, 2019 03:06 pm    Provider: Sakina Donohue MD (Assistant: Lisset Gan MA)    Location: [facility]        Electronically signed by Sakina Donohue MD on  09/13/2019 04:55:50 PM                             SUBJECTIVE:        CC:     Mrs. Chopra is a 78 year old White female.  Patient presents today for three month follow up;         HPI: Mary is here today to follow up on chronic issues.          She is on metformin, glimepiride, and Humulin-N for diabetes.  She previously followed with Roslyn Tovar.  She is on ASA and an ACEI.  She has not been able to tolerate statins but is on Zetia for HLD.  She is due for eye exam, last done 8/2018.  She is due for foot exam (9/2018).        She is on benazepril, metoprolol succinate, and doxazosin for HTN.  No CP, SOB, palpitations.        She is on levothyroxine for hypothyroidism.  No heat/cold intolerance, no changes in hair or skin.        She is on Singulair, Flonase, and azelastine for allergies.  She has chronic congestion.        She is on prn hydrocodone/APAP for left shoulder pain.  She has been on oxycodone/APAP in the past but has weaned down to the Norco.  She uses #30 tabs every 6 months or so and last got a refill in Dec.          She is on allopurinol for gout.  No recent flares.         She is going to see Dr. Turk at [facility] next week for removal of an inflamed cyst on the L posterior neck.  She saw Dr. Baumann yesterday who referred her on.     ROS:     CONSTITUTIONAL:  Positive for unintentional weight gain ( gradual ).   Negative for fatigue or fever.      EYES:  Negative for blurred vision.      E/N/T:  Positive for nasal congestion and frequent rhinorrhea.   Negative for ear pain or sore throat.      CARDIOVASCULAR:  Negative for chest pain and palpitations.      RESPIRATORY:  Negative for recent cough and dyspnea.      GASTROINTESTINAL:  Negative  for constipation, diarrhea, nausea and vomiting.      MUSCULOSKELETAL:  Positive for arthralgias and back pain.   Negative for myalgias.      NEUROLOGICAL:  Negative for paresthesias and weakness.          PMH/FMH/SH:     Last Reviewed on 6/12/2019 01:44 PM by Sakina Donohue    Past Medical History:             PREVENTIVE HEALTH MAINTENANCE             BONE DENSITY: was last done 4/24/2019 with normal results     COLORECTAL CANCER SCREENING: Up to date (colonoscopy q10y; sigmoidoscopy q5y; Cologuard q3y) was last done 6/14/16, Results are in chart; colonoscopy with the following abnormalities noted-- tubular adenoma x 2; The next colonoscopy is due  6/2019     DENTAL CLEANING: Refuses due to fear     EYE EXAM: Diabetic Eye Exam during this calendar year and results are in chart was last done 8/24/2018 NO diabetic retinopathy     MAMMOGRAM: Done within last 2 years and results in are chart was last done 4/24/19 with normal results     PAP SMEAR: was last done 12/19/13 with normal results No longer indicated due to age and history         PAST MEDICAL HISTORY         Positive for    Hyperlipidemia and    Hypertension;     Positive for    Pulmonary Hypertension and    RAD;     Postive for    Gastritis and    Esophageal ulcers;     Positive for    Hematuria with malrotation Rt kidney;     Positive for    DDD L-spine, Rt gluteal tendonitis and    Lumbar Radiculitis and L4-L5 Spondylosis '15;     Positive for    Type 2 Diabetes and    Hypothyroidism;     Positive for    Prominent Rt hepatic lobe '15;         CURRENT MEDICAL PROVIDERS:    Allergist: Dr Everett    Cardiologist: Dr Rae-not seen since 2015    Dermatologist: Dr Baumann    General Surgeon: Dr Spencer    Ophthalmologist: Dr Cowan             ADVANCE DIRECTIVES: Living will         Surgical History:         Positive for    Hysterectomy: Abdominal; Partial; ;     Positive for    Trochanter bursa injection '10;,    L-sine disc and cyst removal '10 (L4-5 and  L5-S1);,    L-spine epidural; and    Cystoscopy '05;;         Family History: NO colon, NO Breast, NO Ovarian, NO Prostate Cancer;  CAD;  CVA;         Social History:     Occupation:    Retired     Marital Status:          Tobacco/Alcohol/Supplements:     Last Reviewed on 6/12/2019 01:44 PM by Sakina Donohue    Tobacco: She has a past history of cigarette smoking; quit date:  Quit Date 1990.  No Etoh;         Substance Abuse History:     Last Reviewed on 6/12/2019 01:44 PM by Sakina Donohue        Mental Health History:     Last Reviewed on 6/12/2019 01:44 PM by Sakina Donohue        Communicable Diseases (eg STDs):     Last Reviewed on 6/12/2019 01:44 PM by Sakina Donohue            Current Problems:     Last Reviewed on 6/12/2019 01:44 PM by Sakina Donohue    Hemangioma, unspecified site     Memory loss NOS     Hyperlipidemia     Fatigue     Gout, unspecified     Joint pain, multiple sites     Patient visit for long term (current) drug use; other     Chronic low back pain     Hip pain     Asthma     Migraine, unspecified, without mention of intractable migraine without mention of status migrainosus     Type 2 DM     Adenomatous colon polyps     Hypothyroidism     Morbid obesity     Pulmonary HTN     Diverticulosis     Hematuria, unspecified     HTN     Allergic rhinitis     Other specified administrative purpose         Immunizations:     Td adult 7/1/2008     Prevnar 13 (Pneumococcal PCV 13) 9/28/2016     Fluzone High-Dose pf (>=65 yr) 9/28/2016     Fluzone High-Dose pf (>=65 yr) 11/8/2017     Fluzone High-Dose pf (>=65 yr) 10/15/2018     PNEUMOVAX 23 (Pneumococcal PPV23) 2/1/2008     Zostavax (Zoster live) 9/1/2012         Allergies:     Last Reviewed on 6/12/2019 01:44 PM by Sakina Donohue    Penicillins:    Sulfonamides:    Keflex:    Claritin:    Monopril:    Serevent (discontinued, use Serevent Diskus):    Avelox:    Crestor: joint pain        Current Medications:     Last Reviewed on 6/12/2019  01:44 PM by Sakina Donohue    Metoprolol Succinate 50mg Tablets, Extended Release Take 1 tablet(s) by mouth daily     BD Insulin Syringe 1ml Syringe 0.3mL 31G 6mm syringe use with insulin TID and PRN DxE11.9     Metformin HCl 500mg Tablet 2 po q am and 1 po q evening     Doxazosin Mesylate 8mg Tablet Take 1 tablet(s) by mouth daily     Glimepiride 4mg Tablet Take 1 tablet(s) by mouth bid     Omeprazole 20mg Tablets, Delayed Release 1 capsule daily     Tricor 48mg Tablet Take 1 tablet(s) by mouth daily     Synthroid 0.05mg Tablet 1 tablet Monday-Friday, then 1.5 tablets Saturday and Sunday     Benazepril HCl 40mg Tablet Take 1 tablet(s) by mouth daily     Torsemide 20mg Tablet Take 1 tablet(s) by mouth daily     Glucose Reagent Blood Test Strips  Reagent Strips check blood sugar QID  DX E11.9     Klor-Con 10 10mEq Tablets, Extended Release Take 1 tablet(s) by mouth daily     Humulin N 100units/1ml Cartridges Inject 16 units QAM and 12 units QHS PLUS sliding scale insulin, 1 unit for BG >175, 2 units >200, 3 units >225 (max 6 units daily)     Albuterol 0.083% Nebulizer Solution 1 vial q 4 hours prn     Trazodone HCl 50mg Tablet 1 tab HS PRN     Montelukast Sodium 10mg Tablet 1 tab daily     Hydrocodone/Acetaminophen 7.5mg/325mg Tablet 1 po qd prn     BD Ultra-Fine 6mm Needle 31G Insulin Syringe 0.3ml Syringe Use with insulin TID and prn Dx E11.9     Fluticasone Propionate 50mcg/1actuation Nasal Spray 1 or 2 sprays in each nostril qday     Zetia 10mg Tablet 1 tab daily (Pt Asst)     Advair Diskus     Ketotifen 0.025% Ophthalmic Solution 1 drop to both eyes bid     OTC Xyzal 10mg Take one tablet once daily     allergy injections every 2-7 days     Fish Oil 1,000mg Softgel capsule 1 / day     Vitamin D3 1,000IU Capsules 2 daily     Aspirin (ASA) 81mg Tablets, Enteric Coated 1 tab daily     Mucinex 600mg Tablets, Extended Release 1-2 tabs daily PRN         OBJECTIVE:        Vitals:         Current: 9/13/2019 3:11:00 PM     Ht:  5 ft, 1.5 in;  Wt: 222.4 lbs;  BMI: 41.3    T: 97.9 F (oral);  BP: 150/65 mm Hg (left arm, sitting);  P: 61 bpm (left arm (BP Cuff), sitting);  sCr: 1.02 mg/dL;  GFR: 53.13        Repeat:     3:49:44 PM     BP:   145/41mm Hg (left arm, sitting)         Exams:     PHYSICAL EXAM:     GENERAL: vital signs recorded - well developed, well nourished;  well groomed;  no apparent distress;     EYES: extraocular movements intact; conjunctiva and cornea are normal; PERRLA;     E/N/T: OROPHARYNX:  normal mucosa, dentition, gingiva, and posterior pharynx;     RESPIRATORY: normal respiratory rate and pattern with no distress; normal breath sounds with no rales, rhonchi, wheezes or rubs;     CARDIOVASCULAR: normal rate; rhythm is regular;  no systolic murmur; no edema;     GASTROINTESTINAL: nontender; normal bowel sounds;     MUSCULOSKELETAL: normal gait; normal overall tone     Left foot exam    Protective sensation using Monofilament test: NORMAL sensation. Patient detects .07 grams of force which is considered normal.    Vascular status: normal peripheral vascular exam with palpable dorsal pedal and posterior tibal pulses and brisk digital capillary refill    Skin is intact without sores or ulcers    Right foot exam    Protective sensation using Monofilament test: NORMAL sensation. Patient detects .07 grams of force which is considered normal.    Vascular status: normal peripheral vascular exam with palpable dorsal pedal and posterior tibal pulses and brisk digital capillary refill    Skin is intact without sores or ulcers         Lab/Test Results:             Urine temperature:  confirmed (09/13/2019),     All urine drug screen levels confirmed negative:  yes (09/13/2019),     Date and time of last pill:  Hydrocodone - weeks ago/AS (09/13/2019),     Performed by:  tls (09/13/2019),     Collection Time:  1540 (09/13/2019),             ASSESSMENT           250.00   E11.65  Type 2 DM              DDx:     401.1   I10  HTN               DDx:     272.4   E78.2  Hyperlipidemia              DDx:     244.9   E03.9  Hypothyroidism              DDx:     274.9   M10.9  Gout, unspecified              DDx:     724.2   M54.5  Chronic low back pain              DDx:     V58.69   Z79.891   Z79.899  Patient visit for long term (current) drug use; other              DDx:         ORDERS:         Meds Prescribed:       Refill of: Hydrocodone/Acetaminophen 7.5mg/325mg Tablet 1 po qd prn  #30 (Thirty) tablet(s) Refills: 0         Radiology/Test Orders:       3014F  Screening mammography results documented and reviewed (PV)1  (In-House)         3017F  Colorectal CA screen results documented and reviewed (PV)  (In-House)           Lab Orders:       05357  URIC - [facility] Uric Acid, Serum  (Send-Out)         75741  TSH - H TSH  (Send-Out)         40082  DIAB1 - [facility] LIPID,CMP, A1C: 15165, 37394, 04532  (Send-Out)         00673  BRIANDA [facility] Microablbumin, quantitative  (Send-Out)         APPTO  Appointment need  (In-House)         66865  Drug test prsmv qual dir optical obs per day  (In-House)           Other Orders:       2028F  Foot examination performed (includes examination through visual inspection, sensory exam with monofi  (In-House)                   PLAN:          Type 2 DM She is on metformin, glimepiride, and Humulin-N for diabetes.  No refills needed.  She previously followed with Roslyn Tovar.  She is on ASA and an ACEI.  She has not been able to tolerate statins but is on Zetia for HLD.  She is due for eye exam, last done 8/2018; already scheduled for next week with Dr. Cowan.  Foot exam today is normal.  RTC 3 months.     LABORATORY:  Labs ordered to be performed today include Diabetes Panel 1; CMP, Lipid, A1C and Microalbumin.  MIPS Vaccines Flu and Pneumonia updated in Shot record Screening mammomgram done within last 2 years and results in are chart Colorectal Cancer Screening is up to date and the results are in the chart     FOLLOW-UP:  Schedule a follow-up visit in 3 months.:.  f/u DM with [place]           Orders:       08268  DIAB1 [facility] LIPID,CMP, A1C: 43826, 13071, 41807  (Send-Out)         05626  BRIANDA [facility] Microablbumin, quantitative  (Send-Out)         3014F  Screening mammography results documented and reviewed (PV)1  (In-House)         3017F  Colorectal CA screen results documented and reviewed (PV)  (In-House)         APPTO  Appointment need  (In-House)            HTN BP at goal at home.  No refills needed.  Checking labs.          Hyperlipidemia Stable.  No refills needed.  Checking labs.          Hypothyroidism Stable.  No refills needed.  Checking labs.     LABORATORY:  Labs ordered to be performed today include TSH.            Orders:       53541  TSH [facility] TSH  (Send-Out)            Gout, unspecified Stable.  No refills needed.  Checking labs.     LABORATORY:  Labs ordered to be performed today include uric acid.            Orders:       52489  URIC - [facility] Uric Acid, Serum  (Send-Out)            Chronic low back pain She is stable on her current regimen.  Pain is well controlled.  No adverse effects.  She does require ongoing use of this controlled substance to function.  Tox screen and Bran run today.  Refills needed today.  RTC 3 months.           Prescriptions:       Refill of: Hydrocodone/Acetaminophen 7.5mg/325mg Tablet 1 po qd prn  #30 (Thirty) tablet(s) Refills: 0          Patient visit for long term (current) drug use; other     Controlled substance documentation: Bran reviewed; drug screen performed and appropriate; consent is reviewed and signed and on the chart.  She is aware of risk of addiction on this medication, understands that she will need to follow up for a review every 3 months and her medications will be adjusted or decreased as deemed appropriate at each visit.  No history of drug or alcohol abuse.  No concerns about diversion or abuse. She denies side effects related to the medication.  She is aware that  she may be called in for pill counts.  The dosing of this medication will be reviewed on a regular basis and reduced if possible..  Ongoing use of a controlled substance is necessary for this patient to have a normal quality of life           Orders:       67256  Drug test prsmv qual dir optical obs per day  (In-House)               Other Orders:       2028F  Foot examination performed (includes examination through visual inspection, sensory exam with monofi  (In-House)           Patient Recommendations:        For  Type 2 DM:     Schedule a follow-up visit in 3 months.                APPOINTMENT INFORMATION:        Monday Tuesday Wednesday Thursday Friday Saturday Sunday            Time:___________________AM  PM   Date:_____________________             CHARGE CAPTURE           **Please note: ICD descriptions below are intended for billing purposes only and may not represent clinical diagnoses**        Primary Diagnosis:         250.00 Type 2 DM            E11.65    Type 2 diabetes mellitus with hyperglycemia              Orders:          55054   Office/outpatient visit; established patient, level 4  (In-House)             3014F   Screening mammography results documented and reviewed (PV)1  (In-House)             3017F   Colorectal CA screen results documented and reviewed (PV)  (In-House)             APPTO   Appointment need  (In-House)           401.1 HTN            I10    Essential (primary) hypertension    272.4 Hyperlipidemia            E78.2    Mixed hyperlipidemia    244.9 Hypothyroidism            E03.9    Hypothyroidism, unspecified    274.9 Gout, unspecified            M10.9    Gout, unspecified    724.2 Chronic low back pain            M54.5    Low back pain    V58.69 Patient visit for long term (current) drug use; other            Z79.891    Long term (current) use of opiate analgesic           Z79.899    Other long term (current) drug therapy              Orders:          85388   Drug test prsmv  qual dir optical obs per day  (In-House)               Other Orders:           2028F   Foot examination performed (includes examination through visual inspection, sensory exam with monofi  (In-House)

## 2021-05-18 NOTE — PROGRESS NOTES
Mary Chopra 1940     Office/Outpatient Visit    Visit Date: Wed, Sep 12, 2018 11:03 am    Provider: Sakina Donohue MD (Assistant: Nandini Rene MA)    Location: [facility]        Electronically signed by Sakina Donohue MD on  09/12/2018 01:07:06 PM                             SUBJECTIVE:        CC:     Mrs. Chopra is a 77 year old White female.  Patient is here for routine check up.;         HPI: Patient complains of seasonal allergies.  She is following with [facility].  She recently restarted allergy shots, but has yet to notice an improvement.          She also complains of difficulty falling asleep and staying asleep.  It comes and goes.  She attributes it primarily to aches and pains. She denies racing thoughts. She tries relaxation techniques, but they do not seem to help.  She has not tried anything except for melatonin.  She is on hydrocodone/APAP for chronic joint pains.  She takes this once daily.  No adverse effects.  She has a lot of trouble without the medication.        HTN: well-controlled benazepril, doxazosin, and metoprolol.  She does not monitor her BP at home.        Diabetes: taking metformin, glimepiride, and Humulin-N. Home blood glucose readings have been averaging in the 150s.   A1c in 6/2018 was 7.4.  She is snacking at night since she is not sleeping.  She complains of weight gain and decreased activity.        Asthma: taking Breo, montelukast, and fluticasone.  She has not required her rescue inhaler recently.        HLD: taking Tricor and Zetia.        Hypothyroidism: well-controlled on levothyroxine.  No heat/cold intolerance.        GERD: well-controlled by taking omeprazole 2-3x weekly.        Gout: no recent flare-ups.  She is taking allopurinol.     ROS:     CONSTITUTIONAL:  Negative for chills and fever.      E/N/T:  Positive for nasal congestion and frequent rhinorrhea.   Negative for ear pain or sore throat.      CARDIOVASCULAR:   Negative for chest pain and palpitations.      RESPIRATORY:  Positive for dyspnea ( at rest ).   Negative for recent cough.      GASTROINTESTINAL:  Negative for constipation, diarrhea, nausea and vomiting.      MUSCULOSKELETAL:  Negative for joint stiffness and myalgias.      INTEGUMENTARY/BREAST:  Negative for pruritis and rash.      NEUROLOGICAL:  Negative for ataxia, dizziness, fainting and headaches.          PMH/FMH/SH:     Last Reviewed on 9/12/2018 12:05 PM by Sakina Donohue    Past Medical History:             PREVENTIVE HEALTH MAINTENANCE             BONE DENSITY: was last done 4/7/16 with normal results     COLORECTAL CANCER SCREENING: Up to date (colonoscopy q10y; sigmoidoscopy q5y; Cologuard q3y) was last done 6/14/16, Results are in chart; colonoscopy with the following abnormalities noted-- tubular adenoma x 2; The next colonoscopy is due  6/2019     DENTAL CLEANING: Refuses due to fear     EYE EXAM: Diabetic Eye Exam during this calendar year and results are in chart was last done 8/24/2018     MAMMOGRAM: Done within last 2 years and results in are chart was last done 4/12/18 with normal results     PAP SMEAR: was last done 12/19/13 with normal results No longer indicated due to age and history         PAST MEDICAL HISTORY         Positive for    Hyperlipidemia and    Hypertension;     Positive for    Pulmonary Hypertension and    RAD;     Postive for    Gastritis and    Esophageal ulcers;     Positive for    Hematuria with malrotation Rt kidney;     Positive for    DDD L-spine, Rt gluteal tendonitis and    Lumbar Radiculitis and L4-L5 Spondylosis '15;     Positive for    Type 2 Diabetes and    Hypothyroidism;     Positive for    Prominent Rt hepatic lobe '15;         CURRENT MEDICAL PROVIDERS:    Cardiologist: Dr Rae-not seen since 2015    Dermatologist: Dr Baumann    General Surgeon: Dr Spencer    Ophthalmologist: Dr Cowan             ADVANCE DIRECTIVES: Living will         Surgical History:          Positive for    Hysterectomy: Abdominal; Partial; ;     Positive for    Trochanter bursa injection '10;,    L-sine disc and cyst removal '10 (L4-5 and L5-S1);,    L-spine epidural; and    Cystoscopy '05;;         Family History: NO colon, NO Breast, NO Ovarian, NO Prostate Cancer;  CAD;  CVA;         Social History:     Occupation:    Retired     Marital Status:          Tobacco/Alcohol/Supplements:     Last Reviewed on 9/12/2018 12:05 PM by Sakina Donohue    Tobacco: She has a past history of cigarette smoking; quit date:  Quit Date 1990.  No Etoh;         Substance Abuse History:     Last Reviewed on 9/12/2018 12:05 PM by Sakina Donohue        Mental Health History:     Last Reviewed on 9/12/2018 12:05 PM by Sakina Donohue        Communicable Diseases (eg STDs):     Last Reviewed on 9/12/2018 12:05 PM by Sakina Donohue            Current Problems:     Last Reviewed on 6/12/2018 03:22 PM by Sakina Donohue    Hemangioma, unspecified site     Memory loss NOS     Hyperlipidemia     Fatigue     Gout, unspecified     Joint pain, multiple sites     Patient visit for long term (current) drug use; other     Chronic low back pain     Hip pain     Asthma     Migraine, unspecified, without mention of intractable migraine without mention of status migrainosus     Type 2 DM     Adenomatous colon polyps     Hypothyroidism     Morbid obesity     Pulmonary HTN     Diverticulosis     Hematuria, unspecified     HTN     Allergic rhinitis     Other specified administrative purpose         Immunizations:     Td adult 7/1/2008     Prevnar 13 (Pneumococcal PCV 13) 9/28/2016     Fluzone High-Dose pf (>=65 yr) 9/28/2016     Fluzone High-Dose pf (>=65 yr) 11/8/2017     PNEUMOVAX 23 (Pneumococcal PPV23) 2/1/2008     Zostavax (Zoster) 9/1/2012         Allergies:     Last Reviewed on 9/12/2018 11:08 AM by Nandini Rene    Penicillins:    Sulfonamides:    Keflex:    Claritin:    Monopril:    Serevent (discontinued, use  Serevent Diskus):    Avelox:    Crestor: joint pain        Current Medications:     Last Reviewed on 9/12/2018 11:08 AM by Nandini Rene 10 10mEq Tablets, Extended Release Take 1 tablet(s) by mouth daily     Allopurinol 300mg Tablet 1 tab daily     Metformin HCl 500mg Tablet 2 po q am and 1 po q evening     Doxazosin Mesylate 8mg Tablet Take 1 tablet(s) by mouth daily     Glimepiride 4mg Tablet Take 1 tablet(s) by mouth bid     Tricor 48mg Tablet Take 1 tablet(s) by mouth daily     Levothyroxine Sodium 50mcg Capsules 1 tab M-F 1 1/2 tab Sat and Sun     Benazepril HCl 40mg Tablet Take 1 tablet(s) by mouth daily     Omeprazole 20mg Tablets, Delayed Release 1 capsule daily     Torsemide 20mg Tablet Take 1 tablet(s) by mouth daily     Hydrocodone/Acetaminophen 7.5mg/325mg Tablet 1 po qd prn     Metoprolol Succinate 50mg Tablets, Extended Release Take 1 tablet(s) by mouth daily     BD Ultra-Fine 6mm Needle 31G Insulin Syringe 0.3ml Syringe Use with insulin TID and prn Dx E11.9     Humulin N 100units/1ml Injection 15 units QAM and 13 units QHS PLUS sliding scale insulin, 1 unit for BG >175, 2 units >200, 3 units >225 (max 6 units daily),     Montelukast Sodium 10mg Tablet 1 tab daily     Fluticasone Propionate 50mcg/1actuation Nasal Spray 1 or 2 sprays in each nostril qday     Zetia 10mg Tablet 1 tab daily (Pt Asst)     Fish Oil 1,000mg Softgel capsule 1 / day     Vitamin D3 1,000IU Capsules 2 daily     Aspirin (ASA) 81mg Tablets, Enteric Coated 1 tab daily     Mucinex 600mg Tablets, Extended Release 1-2 tabs daily PRN     allergy injections every 2-7 days     Breo Ellipta 100mcg/25mcg Inhalation Powder Take 1 inhalation(s) by mouth daily     Ketotifen 0.025% Ophthalmic Solution 1 drop to both eyes bid     OTC Xyzal 10mg Take one tablet once daily         OBJECTIVE:        Vitals:         Current: 9/12/2018 11:04:58 AM    Ht:  5 ft, 1.5 in;  Wt: 227 lbs;  BMI: 42.2    T: 98 F (oral);  BP: 148/54 mm Hg (left  arm, sitting);  P: 68 bpm (left arm (BP Cuff), sitting);  sCr: 0.92 mg/dL;  GFR: 60.33        Exams:     PHYSICAL EXAM:     GENERAL: vital signs recorded - well developed, well nourished;  well groomed;  no apparent distress;     EYES: extraocular movements intact; conjunctiva and cornea are normal; PERRLA;     E/N/T: OROPHARYNX:  normal mucosa, dentition, gingiva, and posterior pharynx;     RESPIRATORY: normal respiratory rate and pattern with no distress; normal breath sounds with no rales, rhonchi, wheezes or rubs;     CARDIOVASCULAR: normal rate; rhythm is regular;  no systolic murmur; no edema;     GASTROINTESTINAL: nontender; normal bowel sounds;     LYMPHATIC: no enlargement of cervical or facial nodes;     MUSCULOSKELETAL: normal gait; normal overall tone     Left foot exam    Protective sensation using Monofilament test: Decreased, with estimated force of 2 grams applied.    Vascular status: normal peripheral vascular exam with palpable dorsal pedal and posterior tibal pulses and brisk digital capillary refill    Skin is intact without sores or ulcers    Right foot exam    Protective sensation using Monofilament test: NORMAL sensation. Patient detects .07 grams of force which is considered normal.    Vascular status: normal peripheral vascular exam with palpable dorsal pedal and posterior tibal pulses and brisk digital capillary refill    Skin is intact without sores or ulcers         Lab/Test Results:             Urine temperature:  confirmed (09/12/2018),     All urine drug screen levels confirmed negative:  yes (09/12/2018),     Date and time of last pill:  hydrocodone - 1 week ago /elizabet (09/12/2018),     Performed by:  sin (09/12/2018),     Collection Time:  1245 (09/12/2018),     Hemoglobin A1c:  8.4 (09/12/2018),     Performed by::  sin (09/12/2018),             ASSESSMENT           724.2   M54.5  Chronic low back pain              DDx:     V58.69   Z79.891   Z79.899  Patient visit for long term  (current) drug use; other              DDx:     780.52   G47.00  Insomnia              DDx:     250.00   E11.65  Type 2 DM              DDx:     272.4   E78.2  Hyperlipidemia              DDx:     244.9   E03.9  Hypothyroidism              DDx:     477.9   J30.9  Allergic rhinitis              DDx:         ORDERS:         Radiology/Test Orders:       21621  Pro services for allergen immunotherapy not including provision of allergenic extracts; 2+ injection  (In-House)  (Pt is on Metoprolol and the allergist is aware of.         serum expires 7--6-19./pr)         Lab Orders:       06651*  Hgb A1c fast lab  (In-House)         82230  Drug test prsmv qual dir optical obs per day  (In-House)         APPTO  Appointment need  (In-House)           Procedures Ordered:       61219  Collection of capillary blood specimen (eg, finger, heel, ear stick)  (In-House)           Other Orders:       2028F  Foot examination performed (includes examination through visual inspection, sensory exam with monofi  (In-House)           Calculated BMI above the upper parameter and a follow-up plan was documented in the medical record  (In-House)                   PLAN:          Chronic low back pain She is pretty stable on her hydrocodone/APAP for joint pains.  She does still have some aches and pains but the medication allows her to function.  No adverse effects.  No refills needed today.  Tox screen and Bran run today.  RTC 3 months.         FOLLOW-UP: Schedule a follow-up visit in 3 months..  f/u chronic low back pain with [place]           Orders:       APPTO  Appointment need  (In-House)             Patient Education Handouts:       [facility] [place] [facility] to the [facility] Medication Compliance           Patient visit for long term (current) drug use; other     Controlled substance documentation: Bran reviewed; drug screen performed and appropriate; consent is reviewed and signed and on the chart.  She is aware of risk of addiction on this medication, understands  that she will need to follow up for a review every 3 months and her medications will be adjusted or decreased as deemed appropriate at each visit.  No history of drug or alcohol abuse.  No concerns about diversion or abuse. She denies side effects related to the medication.  She is aware that she may be called in for pill counts.  The dosing of this medication will be reviewed on a regular basis and reduced if possible..  Ongoing use of a controlled substance is necessary for this patient to have a normal quality of life           Orders:       36865  Drug test prsmv qual dir optical obs per day  (In-House)            Insomnia She has been having problems with insomnia.  She has tried melatonin in the past but found it overly sedating.  She is not sure about the dosage on that.  I would like her to check on the dosage and if it is 5-8 mg, she should try using a 1-2 mg dose instead.  If she was already taking a 1-2 mg dose, then we can try some trazodone for that.  She can call back in and I will send that in for her.          Type 2 DM Diabetes: taking metformin, glimepiride, and Humulin-N. Checking A1c in house today.  She is trying to work on her weight and stick to a diabetic diet, but this is tough for her.  She struggles with eating too many sweets.  She would like to exercise but the joint pain holds her back.  Foot exam looks good.  She is due for eye exam (8/2018).     LABORATORY:  Labs ordered to be performed today include Hgb A1c inhouse fast lab.  MIPS     BMI Elevated - Follow-Up Plan: She was provided education on weight loss strategies           Orders:       12828*  Hgb A1c fast lab  (In-House)           Calculated BMI above the upper parameter and a follow-up plan was documented in the medical record  (In-House)         26838  Collection of capillary blood specimen (eg, finger, heel, ear stick)  (In-House)            Hyperlipidemia Stable.  No refills needed.          Hypothyroidism Stable.  No  refills needed.          Allergic rhinitis           Orders:       53535  Pro services for allergen immunotherapy not including provision of allergenic extracts; 2+ injection  (In-House)  (Pt is on Metoprolol and the allergist is aware of.         serum expires 7--6-19./pr)             Other Orders:       2028F  Foot examination performed (includes examination through visual inspection, sensory exam with monofi  (In-House)           Patient Recommendations:        For  Chronic low back pain:     Schedule a follow-up visit in 3 months.                APPOINTMENT INFORMATION:        Monday Tuesday Wednesday Thursday Friday Saturday Sunday            Time:___________________AM  PM   Date:_____________________             CHARGE CAPTURE           **Please note: ICD descriptions below are intended for billing purposes only and may not represent clinical diagnoses**        Primary Diagnosis:         724.2 Chronic low back pain            M54.5    Low back pain              Orders:          07596   Office/outpatient visit; established patient, level 4  (In-House)             APPTO   Appointment need  (In-House)           V58.69 Patient visit for long term (current) drug use; other            Z79.891    Long term (current) use of opiate analgesic           Z79.899    Other long term (current) drug therapy              Orders:          03793   Drug test prsmv qual dir optical obs per day  (In-House)           780.52 Insomnia            G47.00    Insomnia, unspecified    250.00 Type 2 DM            E11.65    Type 2 diabetes mellitus with hyperglycemia              Orders:          12473*   Hgb A1c fast lab  (In-House)                Calculated BMI above the upper parameter and a follow-up plan was documented in the medical record  (In-House)             10994   Collection of capillary blood specimen (eg, finger, heel, ear stick)  (In-House)           272.4 Hyperlipidemia            E78.2    Mixed hyperlipidemia     244.9 Hypothyroidism            E03.9    Hypothyroidism, unspecified    477.9 Allergic rhinitis            J30.9    Allergic rhinitis, unspecified              Orders:          57925   Pro services for allergen immunotherapy not including provision of allergenic extracts; 2+ injection  (In-House)  (Pt is on Metoprolol and the allergist is aware of.         serum expires 7--6-19./pr)             Other Orders:           2028F   Foot examination performed (includes examination through visual inspection, sensory exam with monofi  (In-House)

## 2021-05-18 NOTE — PROGRESS NOTES
"Mary Chopra. 1940     Office/Outpatient Visit    Visit Date: Mon, Dec 10, 2018 12:51 pm    Provider: Sakina Donohue MD (Assistant: Lisset Gan MA)    Location: [facility]        Electronically signed by Sakina Donohue MD on  12/10/2018 02:52:04 PM                             SUBJECTIVE:        CC:     Mrs. Chopra is a 78 year old White female.  This is a follow-up visit.  Patient presents today for three month follow up;         HPI: Mary is here to f/u on chronic issues.          She is on metformin, glimepiride, and Humulin-N for diabetes.  She follows with Roslyn Tovar.  She is on ASA and an ACEI.  She has not been able to tolerate statins but is on Zetia for HLD.  She is UTD on eye exam, last done 8/2018.  She is UTD on foot exam (9/2018).  Last A1c was 8.4 in Sept.  She has not really been adhering to her diabetic diet coming into the holidays.  In general, she has been trying to change her cooking habits, however.  She is still having trouble with sleep and \"when I don't sleep, I eat.\"          She is on benazepril, metoprolol succinate, and doxazosin for HTN.  BP has been \"up and down.\"  She has not been checking it much at home but when she does, it has been 140s systolic.  She has been having a lot of holiday stress recently.  No CP, SOB, palpitations.        She is on levothyroxine for hypothyroidism.  No heat/cold intolerance, no changes in hair or skin.        She is on Singulair, Flonase, and azelastine for allergies.  She has chronic congestion.        She is on prn hydrocodone/APAP for left shoulder pain.  She has been on oxycodone/APAP in the past but has weaned down to the Norco.  She uses #30 tabs every 6 months or so.   She is on allopurinol for gout.  No recent flares.     ROS:     CONSTITUTIONAL:  Negative for chills and fever.      E/N/T:  Positive for nasal congestion and frequent rhinorrhea.   Negative for ear pain or sore throat.      CARDIOVASCULAR:  " Negative for chest pain and palpitations.      RESPIRATORY:  Negative for recent cough and dyspnea.      GASTROINTESTINAL:  Negative for constipation, diarrhea, nausea and vomiting.      MUSCULOSKELETAL:  Negative for joint stiffness and myalgias.      INTEGUMENTARY/BREAST:  Negative for pruritis and rash.      NEUROLOGICAL:  Negative for ataxia, dizziness, fainting and headaches.      PSYCHIATRIC:  Positive for feelings of stress and sleep disturbance.          PMH/FMH/SH:     Last Reviewed on 12/10/2018 01:01 PM by Sakina Donohue    Past Medical History:             PREVENTIVE HEALTH MAINTENANCE             BONE DENSITY: was last done 4/7/16 with normal results     COLORECTAL CANCER SCREENING: Up to date (colonoscopy q10y; sigmoidoscopy q5y; Cologuard q3y) was last done 6/14/16, Results are in chart; colonoscopy with the following abnormalities noted-- tubular adenoma x 2; The next colonoscopy is due  6/2019     DENTAL CLEANING: Refuses due to fear     EYE EXAM: Diabetic Eye Exam during this calendar year and results are in chart was last done 8/24/2018 NO diabetic retinopathy     MAMMOGRAM: Done within last 2 years and results in are chart was last done 4/12/18 with normal results     PAP SMEAR: was last done 12/19/13 with normal results No longer indicated due to age and history         PAST MEDICAL HISTORY         Positive for    Hyperlipidemia and    Hypertension;     Positive for    Pulmonary Hypertension and    RAD;     Postive for    Gastritis and    Esophageal ulcers;     Positive for    Hematuria with malrotation Rt kidney;     Positive for    DDD L-spine, Rt gluteal tendonitis and    Lumbar Radiculitis and L4-L5 Spondylosis '15;     Positive for    Type 2 Diabetes and    Hypothyroidism;     Positive for    Prominent Rt hepatic lobe '15;         CURRENT MEDICAL PROVIDERS:    Allergist: Dr Everett    Cardiologist: Dr Rae-not seen since 2015    Dermatologist: Dr Baumann    General Surgeon: Dr Spencer     Ophthalmologist: Dr Cowan             ADVANCE DIRECTIVES: Living will         Surgical History:         Positive for    Hysterectomy: Abdominal; Partial; ;     Positive for    Trochanter bursa injection '10;,    L-sine disc and cyst removal '10 (L4-5 and L5-S1);,    L-spine epidural; and    Cystoscopy '05;;         Family History: NO colon, NO Breast, NO Ovarian, NO Prostate Cancer;  CAD;  CVA;         Social History:     Occupation:    Retired     Marital Status:          Tobacco/Alcohol/Supplements:     Last Reviewed on 12/10/2018 01:01 PM by Sakina Donohue    Tobacco: She has a past history of cigarette smoking; quit date:  Quit Date 1990.  No Etoh;         Substance Abuse History:     Last Reviewed on 12/10/2018 01:01 PM by Sakina Donohue        Mental Health History:     Last Reviewed on 12/10/2018 01:01 PM by Sakina Donohue        Communicable Diseases (eg STDs):     Last Reviewed on 12/10/2018 01:01 PM by Sakina Donohue            Current Problems:     Last Reviewed on 9/12/2018 12:05 PM by Sakina Donohue    Hemangioma, unspecified site     Memory loss NOS     Hyperlipidemia     Fatigue     Gout, unspecified     Joint pain, multiple sites     Patient visit for long term (current) drug use; other     Chronic low back pain     Hip pain     Asthma     Migraine, unspecified, without mention of intractable migraine without mention of status migrainosus     Type 2 DM     Adenomatous colon polyps     Hypothyroidism     Morbid obesity     Pulmonary HTN     Diverticulosis     Hematuria, unspecified     HTN     Allergic rhinitis     Other specified administrative purpose         Immunizations:     Td adult 7/1/2008     Prevnar 13 (Pneumococcal PCV 13) 9/28/2016     Fluzone High-Dose pf (>=65 yr) 9/28/2016     Fluzone High-Dose pf (>=65 yr) 11/8/2017     Fluzone High-Dose pf (>=65 yr) 10/15/2018     PNEUMOVAX 23 (Pneumococcal PPV23) 2/1/2008     Zostavax (Zoster live) 9/1/2012         Allergies:     Last  Reviewed on 10/09/2018 04:47 PM by Rosalee Rene    Penicillins:    Sulfonamides:    Keflex:    Claritin:    Monopril:    Serevent (discontinued, use Serevent Diskus):    Avelox:    Crestor: joint pain        Current Medications:     Last Reviewed on 10/09/2018 04:47 PM by Rosalee Rene    Klor-Con 10 10mEq Tablets, Extended Release Take 1 tablet(s) by mouth daily     Metoprolol Succinate 50mg Tablets, Extended Release Take 1 tablet(s) by mouth daily     Allopurinol 300mg Tablet 1 tab daily     Metformin HCl 500mg Tablet 2 po q am and 1 po q evening     Glimepiride 4mg Tablet Take 1 tablet(s) by mouth bid     Synthroid 0.05mg Tablet 1 tablet Monday-Friday, then 1.5 tablets Saturday and Sunday     Benazepril HCl 40mg Tablet Take 1 tablet(s) by mouth daily     Humulin N 100units/1ml Injection 15 units QAM and 13 units QHS PLUS sliding scale insulin, 1 unit for BG >175, 2 units >200, 3 units >225 (max 6 units daily),     Doxazosin Mesylate 8mg Tablet Take 1 tablet(s) by mouth daily     Tricor 48mg Tablet Take 1 tablet(s) by mouth daily     Levothyroxine Sodium 50mcg Capsules 1 tab M-F 1 1/2 tab Sat and Sun     Omeprazole 20mg Tablets, Delayed Release 1 capsule daily     Torsemide 20mg Tablet Take 1 tablet(s) by mouth daily     Hydrocodone/Acetaminophen 7.5mg/325mg Tablet 1 po qd prn     BD Ultra-Fine 6mm Needle 31G Insulin Syringe 0.3ml Syringe Use with insulin TID and prn Dx E11.9     Montelukast Sodium 10mg Tablet 1 tab daily     Fluticasone Propionate 50mcg/1actuation Nasal Spray 1 or 2 sprays in each nostril qday     Zetia 10mg Tablet 1 tab daily (Pt Asst)     Breo Ellipta 100mcg/25mcg Inhalation Powder Take 1 inhalation(s) by mouth daily     Ketotifen 0.025% Ophthalmic Solution 1 drop to both eyes bid     OTC Xyzal 10mg Take one tablet once daily     allergy injections every 2-7 days     Fish Oil 1,000mg Softgel capsule 1 / day     Vitamin D3 1,000IU Capsules 2 daily     Aspirin (ASA) 81mg Tablets, Enteric  Coated 1 tab daily     Mucinex 600mg Tablets, Extended Release 1-2 tabs daily PRN         OBJECTIVE:        Vitals:         Current: 12/10/2018 12:57:17 PM    Ht:  5 ft, 1.5 in;  Wt: 226.2 lbs;  BMI: 42.0    T: 98 F (oral);  BP: 177/47 mm Hg (right arm, sitting);  P: 69 bpm (right arm (BP Cuff), sitting);  sCr: 0.92 mg/dL;  GFR: 59.33        Repeat:     1:27:03 PM     BP:   142/78mm Hg (left arm, sitting)         Exams:     PHYSICAL EXAM:     GENERAL: vital signs recorded - well developed, well nourished;  well groomed;  no apparent distress;     EYES: extraocular movements intact; conjunctiva and cornea are normal; PERRLA;     E/N/T: OROPHARYNX:  normal mucosa, dentition, gingiva, and posterior pharynx;     RESPIRATORY: normal respiratory rate and pattern with no distress; normal breath sounds with no rales, rhonchi, wheezes or rubs;     CARDIOVASCULAR: normal rate; rhythm is regular;  no systolic murmur; no edema;     GASTROINTESTINAL: nontender; normal bowel sounds;     MUSCULOSKELETAL: normal gait; normal overall tone         ASSESSMENT           724.2   M54.5  Chronic low back pain              DDx:     V58.69   Z79.891   Z79.899  Patient visit for long term (current) drug use; other              DDx:     250.00   E11.65  Type 2 DM              DDx:     401.1   I10  HTN              DDx:     244.9   E03.9  Hypothyroidism              DDx:     274.9   M10.9  Gout, unspecified              DDx:         ORDERS:         Meds Prescribed:       Refill of: Torsemide 20mg Tablet Take 1 tablet(s) by mouth daily  #90 (Ninety) tablet(s) Refills: 1       Refill of: Hydrocodone/Acetaminophen 7.5mg/325mg Tablet 1 po qd prn  #30 (Thirty) tablet(s) Refills: 0         Radiology/Test Orders:       3014F  Screening mammography results documented and reviewed (PV)1  (In-House)         3017F  Colorectal CA screen results documented and reviewed (PV)  (In-House)         2022F  Dilated retinal eye exam w/interpret by  ophthalmologist/optometrist documented/reviewed (DM)4  (In-House)           Lab Orders:       APPTO  Appointment need  (In-House)         02513  DIAB2 - [facility] CMP A1C LIPID AND MICRO ALBUM CR RATIO: [id number]  (Send-Out)         88583  TSH - [facility] TSH  (Send-Out)           Other Orders:         Calculated BMI above the upper parameter and a follow-up plan was documented in the medical record  (In-House)                   PLAN:          Chronic low back pain She uses about #30 every 6 months.  Pain has been well controlled.  No adverse effects.  She does require ongoing use of this controlled substance to function.  Prior tox screen appropriate.  Bran run today.  RTC 3 months for AWV.     MIPS Vaccines Flu and Pneumonia updated in Shot record    DIABETIC EYE EXAM: Diabetic Eye Exam during this calendar year and results are in chart     MAMMOGRAM: Done within last 2 years and results in are chart     COLORECTAL CANCER SCREENING: Results are in chart     BMI Elevated - Follow-Up Plan: She was provided education on weight loss strategies     FOLLOW-UP: Schedule a follow-up visit in 3 months..  Medicare wellness 30 min with [employer]           Prescriptions:       Refill of: Hydrocodone/Acetaminophen 7.5mg/325mg Tablet 1 po qd prn  #30 (Thirty) tablet(s) Refills: 0           Orders:       3014F  Screening mammography results documented and reviewed (PV)1  (In-House)         3017F  Colorectal CA screen results documented and reviewed (PV)  (In-House)           Calculated BMI above the upper parameter and a follow-up plan was documented in the medical record  (In-House)         2022F  Dilated retinal eye exam w/interpret by ophthalmologist/optometrist documented/reviewed (DM)4  (In-House)         APPTO  Appointment need  (In-House)             Patient Education Handouts:       [facility] Medication Compliance           Patient visit for long term (current) drug use; other     Controlled substance  documentation: Bran reviewed; prior drug screen consistent; consent is reviewed and signed and on the chart.  She is aware of risk of addiction on this medication, understands that she will need to follow up for a review every 3 months and her medications will be adjusted or decreased as deemed appropriate at each visit.  No history of drug or alcohol abuse.  No concerns about diversion or abuse. She denies side effects related to the medication.  She is aware that she may be called in for pill counts.  The dosing of this medication will be reviewed on a regular basis and reduced if possible..  Ongoing use of a controlled substance is necessary for this patient to have a normal quality of life          Type 2 DM She is on metformin, glimepiride, and Humulin-N for diabetes.  She follows with Roslyn Tovar.  She is on ASA and an ACEI.  She has not been able to tolerate statins but is on Zetia for HLD.  She is UTD on eye exam, last done 8/2018.  She is UTD on foot exam (9/2018).  Last A1c was 8.4 in Sept; checking labs.     LABORATORY:  Labs ordered to be performed today include Diabetes Panel 2;CMP, A1C, Lipid, Microalbumin:Creatinine Ratio.            Orders:       [id number]  DIAB2 - [facility] CMP A1C LIPID AND MICRO ALBUM CR RATIO: [id number]  (Send-Out)            HTN BP continues to run up and down.  Continue on metoprolol succinate, benazepril and doxazosin.            Prescriptions:       Refill of: Torsemide 20mg Tablet Take 1 tablet(s) by mouth daily  #90 (Ninety) tablet(s) Refills: 1          Hypothyroidism Stable.  Checking labs.  No refills needed.     LABORATORY:  Labs ordered to be performed today include TSH.            Orders:       11409  TSH - [facility] TSH  (Send-Out)            Gout, unspecified She is on allopurinol for gout but is not sure that she really needs it.  She would like to try going off the allopurinol.             Patient Recommendations:        For  Chronic low back pain:      Schedule a follow-up visit in 3 months.                APPOINTMENT INFORMATION:        Monday Tuesday Wednesday Thursday Friday Saturday Sunday            Time:___________________AM  PM   Date:_____________________             CHARGE CAPTURE           **Please note: ICD descriptions below are intended for billing purposes only and may not represent clinical diagnoses**        Primary Diagnosis:         724.2 Chronic low back pain            M54.5    Low back pain              Orders:          08592   Office/outpatient visit; established patient, level 4  (In-House)             3014F   Screening mammography results documented and reviewed (PV)1  (In-House)             3017F   Colorectal CA screen results documented and reviewed (PV)  (In-House)                Calculated BMI above the upper parameter and a follow-up plan was documented in the medical record  (In-House)             2022F   Dilated retinal eye exam w/interpret by ophthalmologist/optometrist documented/reviewed (DM)4  (In-House)             APPTO   Appointment need  (In-House)           V58.69 Patient visit for long term (current) drug use; other            Z79.891    Long term (current) use of opiate analgesic           Z79.899    Other long term (current) drug therapy    250.00 Type 2 DM            E11.65    Type 2 diabetes mellitus with hyperglycemia    401.1 HTN            I10    Essential (primary) hypertension    244.9 Hypothyroidism            E03.9    Hypothyroidism, unspecified    274.9 Gout, unspecified            M10.9    Gout, unspecified

## 2021-05-18 NOTE — PROGRESS NOTES
Mary Chopra  1940     Office/Outpatient Visit    Visit Date: Fri, Jun 19, 2020 09:12 am    Provider: Sakina Donohue MD (Assistant: Jing Jesus MA)    Location: [facility]        Electronically signed by Sakina Donohue MD on  06/19/2020 02:27:21 PM                             Subjective:        CC: Mrs. Chopra is a 79 year old White female.  Medicare Wellness exam;         HPI:       She is due for colonoscopy, last done 6/2016 and this showed tubular adenoma x2.  Three year repeat recommended.  Pap smear is no longer indicated by age and history. She is due for mammogram, last done 4/2019 and this was normal.  She is rescheduled for that in July 9th.  She is UTD on DEXA, last done 4/2019 and this was normal.  She is UTD on Pneumovax (2/2008), Prevnar (9/2016), Zostavax (9/2012), and flu (10/2019).  She is due for Shingrix and Td (2008).  She is due for routine labs including DM panel, TSH and uric acid.  She is UTD on eye exam (9/2019) and foot exam (9/2019).        She is on prn hydrocodone/APAP for left shoulder pain.  She has been on oxycodone/APAP in the past but has weaned down to the Norco.  She uses #30 tabs every 6 months or so.  No adverse effects.  She uses them pretty rarely.    Mrs. Chopra is here for a Medicare wellness visit.  The required HRA questions are integrated within this visit note. Family medical history and individual medical/surgical history were reviewed and updated.  A current height, weight, BMI, blood pressure, and pulse were recorded in the vitals section of the note and have been reviewed. Patient's medications, including supplements, were recorded in the chart and reviewed.  Current providers and suppliers were reviewed and updated.          Self-Assessment of Health: She rates her health as good. She rates her confidence of being able to control/manage most of her health problems as somewhat confident. Her physical/emotional health has limited  "her social activites slightly.  A review of cognitive impairment was performed, including ability to drive a car, manage finances, and any memory changes, and was found to be negative.  A review of functional ability, including bathing, dressing, walking, and urine/bowel continence as well as level of safety was performed and was found to be negative.  Falls Risk: Has not had any falls or only one fall without injury in the past year.  In regard to hearing, she reports having trouble hearing the TV/radio when others do not and having to strain to hear or understand conversations, but not wearing hearing aid(s).  Concerning home safety, she reports that at home she DOES have a skid resistant shower/tub, handrails on stairs, functioning smoke alarms and absence of throw rugs, but not adequate lighting or grab bars in the bath.          Immunization Status: Unsure of last tetanus; Physical Activity: She exercises but less than 20 minutes 3 days per week; Type of diet patient normally eats is described as diabetic diet Tobacco: Former smoker Preventative Health updated today           PHQ-9 Depression Screening: Completed form scanned and in chart; Total Score 2     ROS:     CONSTITUTIONAL:  Positive for unintentional weight gain ( gradual ).   Negative for fatigue or fever.      EYES:  Negative for blurred vision.      E/N/T:  Positive for nasal congestion and frequent rhinorrhea.   Negative for ear pain or sore throat.      CARDIOVASCULAR:  Negative for chest pain and palpitations.      RESPIRATORY:  Negative for recent cough and dyspnea.      GASTROINTESTINAL:  Negative for constipation, diarrhea, nausea and vomiting.      GENITOURINARY:  Negative for dysuria and urinary incontinence.      MUSCULOSKELETAL:  Positive for arthralgias and back pain.   Negative for myalgias.      INTEGUMENTARY/BREAST:  Positive for extremely dry skin (pruritic, \"my skin just peels off\" -- she has seen Derm).      NEUROLOGICAL:  Negative " for paresthesias and weakness.      PSYCHIATRIC:  Negative for anxiety, depression and sleep disturbance.          Past Medical History / Family History / Social History:         Last Reviewed on 6/19/2020 09:34 AM by Sakina Donohue    Past Medical History:             PREVENTIVE HEALTH MAINTENANCE             BONE DENSITY: was last done 4/24/2019 with normal results     COLORECTAL CANCER SCREENING: Up to date (colonoscopy q10y; sigmoidoscopy q5y; Cologuard q3y) was last done 6/14/16, Results are in chart; colonoscopy with the following abnormalities noted-- tubular adenoma x 2; The next colonoscopy is due  6/2019     DENTAL CLEANING: Refuses due to fear     EYE EXAM: Diabetic Eye Exam during this calendar year and results are in chart was last done 9/23/19 NO diabetic retinopathy     MAMMOGRAM: Done within last 2 years and results in are chart was last done 4/24/19 with normal results     PAP SMEAR: was last done 12/19/13 with normal results No longer indicated due to age and history         PAST MEDICAL HISTORY         Positive for    Hyperlipidemia and    Hypertension;     Positive for    Pulmonary Hypertension and    RAD;     Postive for    Gastritis and    Esophageal ulcers;     Positive for    Hematuria with malrotation Rt kidney;     Positive for    DDD L-spine, Rt gluteal tendonitis and    Lumbar Radiculitis and L4-L5 Spondylosis '15;     Positive for    Type 2 Diabetes and    Hypothyroidism;     Positive for    Cataract: bilateral; declines surgery; and    Prominent Rt hepatic lobe '15;         CURRENT MEDICAL PROVIDERS:    Allergist: Dr Everett    Cardiologist: Dr Rae-not seen since 2015    Dermatologist: Dr Baumann    General Surgeon: Dr Spencer    Ophthalmologist: Dr Cowan             ADVANCE DIRECTIVES: Living will         Surgical History:         Positive for    Hysterectomy: Abdominal; Partial; ;     Positive for    Trochanter bursa injection '10;,    L-sine disc and cyst removal '10 (L4-5 and  L5-S1);,    L-spine epidural; and    Cystoscopy '05;;         Family History: NO colon, NO Breast, NO Ovarian, NO Prostate Cancer;  CAD;  CVA;         Social History:     Occupation:    Retired     Marital Status:          Tobacco/Alcohol/Supplements:     Last Reviewed on 6/19/2020 09:34 AM by Sakina Donohue    Tobacco: She has a past history of cigarette smoking; quit date:  Quit Date 1990.  No Etoh;         Substance Abuse History:     Last Reviewed on 6/19/2020 09:34 AM by Sakina Donohue        Mental Health History:     Last Reviewed on 6/19/2020 09:34 AM by Sakina Donohue        Communicable Diseases (eg STDs):     Last Reviewed on 6/19/2020 09:34 AM by Sakina Donohue        Current Problems:     Last Reviewed on 12/12/2019 03:00 PM by Sakina Donohue    Migraine, unspecified, without mention of intractable migraine without mention of status migrainosus    Hematuria, unspecified     Neoplasm of uncertain behavior of colon    Hypothyroidism, unspecified    Type 2 diabetes mellitus with hyperglycemia    Morbid (severe) obesity due to excess calories    Essential (primary) hypertension    Moderate persistent asthma with (acute) exacerbation    Pulmonary HTN    Diverticulosis    Long term (current) use of opiate analgesic    Other long term (current) drug therapy    Pain in unspecified hip    Low back pain    Other fatigue    Gout, unspecified    Pain in unspecified joint    Mixed hyperlipidemia    Age-related cognitive decline    Hemangioma unspecified site    Allergic rhinitis    Allergic rhinitis, unspecified    Encounter for screening mammogram for malignant neoplasm of breast        Immunizations:     Td adult 7/1/2008    Prevnar 13 (Pneumococcal PCV 13) 9/28/2016    Fluzone High-Dose pf (>=65 yr) 9/28/2016    Fluzone High-Dose pf (>=65 yr) 11/8/2017    Fluzone High-Dose pf (>=65 yr) 10/15/2018    Fluzone High-Dose pf (>=65 yr) 10/14/2019    PNEUMOVAX 23 (Pneumococcal PPV23) 2/1/2008    Zostavax  (Zoster live) 9/1/2012        Allergies:     Last Reviewed on 12/12/2019 03:00 PM by Sakina Donohue    Penicillins:      Sulfonamides:      Keflex:      Claritin:      shell fish -muscles:      Monopril:      Serevent (discontinued, use Serevent Diskus):      Avelox:      Crestor: joint pain     Banana:          Current Medications:     Last Reviewed on 12/12/2019 03:00 PM by Sakina Donohue    Klor-Con 10 10 mEq oral tablet, extended release [TAKE ONE TABLET BY MOUTH DAILY]    montelukast 10 mg oral tablet [TAKE ONE TABLET BY MOUTH DAILY]    omeprazole 20 mg oral capsule,delayed release (enteric coated) [TAKE ONE CAPSULE BY MOUTH DAILY]    traZODone 50 mg oral tablet [1 tab HS PRN]    Mucinex 600 mg oral Tablet,Extended Release 12 hr [1-2 tabs daily PRN]    aspirin 81 mg oral tablet, delayed release (enteric coated) [1 tab daily]    Vitamin D3 1,000 unit oral capsule [2 daily]    Fish Oil 300-1,000 mg oral capsule [1 / day]    benazepriL 40 mg oral tablet [TAKE ONE TABLET BY MOUTH DAILY]    torsemide 20 mg oral tablet [TAKE ONE TABLET BY MOUTH DAILY]    glimepiride 4 mg oral tablet [TAKE ONE TABLET BY MOUTH TWICE A DAY]    doxazosin 8 mg oral tablet [TAKE 1 TABLET BY MOUTH DAILY]    Fluticasone Propionate 50mcg/1actuation Nasal Spray [1 or 2 sprays in each nostril qday ]    metFORMIN 500 mg oral tablet [2 po q am and 1 po q evening]    Zetia 10 mg oral tablet [1 tab daily (Pt Asst)]    Tricor 48 mg oral tablet [TAKE ONE TABLET BY MOUTH DAILY]    HYDROcodone-acetaminophen 7.5-325 mg oral tablet [1 po qd prn]    HumuLIN N NPH U-100 Insulin 100 unit/mL subcutaneous Suspension [INJECT 18 UNITS SUBCUTANEOUSLY EVERY MORNING AND INJECT 10 UNITS SUBCUTANEOUSLY EVERY NIGHT AT BEDTIME]    BD Ultra-Fine 6mm Needle 31G Insulin Syringe 0.3ml Syringe [Use with insulin TID and prn Dx E11.9]    allergy injections every 2-7 days     OTC Xyzal 10mg Take one tablet once daily      ketotifen fumarate 0.025 % (0.035 %) ophthalmic  "(eye) Drops [1 drop to both eyes bid]    levothyroxine 50 mcg oral tablet [TAKE ONE TABLET BY MOUTH MONDAY-FRIDAY, THEN TAKE ONE AND ONE-HALF TABLET BY MOUTH ON SATURDAY AND SUNDAY]    BD Veo Insulin Syringe Ultra-Fine 0.3 mL 31 gauge x 15/64\"  [USE WITH INSULIN THREE TIMES A DAY AND AS NEEDED]    Glucose Reagent Blood Test Strips  Reagent Strips [check blood sugar QID  DX E11.9]    Advair Diskus     albuterol sulfate 2.5 mg /3 mL (0.083 %) Inhalation Solution for Nebulization [1 vial q 4 hours prn]    metoprolol succinate 50 mg oral Tablet, Extended Release 24 hr [TAKE ONE TABLET BY MOUTH DAILY]    allopurinoL 300 mg oral tablet [TAKE ONE TABLET BY MOUTH DAILY]        Objective:        Vitals:         Current: 6/19/2020 9:28:16 AM    Ht:  5 ft, 1.5 in;  Wt: 218.6 lbs;  BMI: 40.6T: 97.1 F (temporal);  BP: 177/88 mm Hg (left arm, sitting);  P: 59 bpm (left arm (BP Cuff), sitting);  sCr: 1.07 mg/dL;  GFR: 49.51VA: 20/40 OD, 20/70 OS (near, without correction)        Repeat:     10:12:43 AM  BP:   162/64mm Hg (left arm, sitting, Second take, heart rate: 60)     Exams:     PHYSICAL EXAM:     GENERAL: vital signs recorded - well developed, well nourished;  well groomed;  no apparent distress;     EYES: extraocular movements intact; conjunctiva and cornea are normal; PERRLA;     E/N/T: OROPHARYNX:  normal mucosa, dentition, gingiva, and posterior pharynx;     RESPIRATORY: normal respiratory rate and pattern with no distress; normal breath sounds with no rales, rhonchi, wheezes or rubs;     CARDIOVASCULAR: normal rate; rhythm is regular;  no systolic murmur; no edema;     GASTROINTESTINAL: nontender; normal bowel sounds;     MUSCULOSKELETAL: normal gait; normal overall tone     NEUROLOGIC: mental status: alert and oriented x 3; Reflexes: brachioradialis: 2+; knee jerks: 2+;     PSYCHIATRIC: appropriate affect and demeanor; normal psychomotor function; normal speech pattern;         Lab/Test Results:         Urine " "temperature: confirmed (06/19/2020),     All urine drug screen levels confirmed negative: yes (06/19/2020),     Date and time of last pill: hydrocodone \"several weeks ago\" (06/19/2020),     Performed by: SCS/pr (06/19/2020),     Collection Time: 10:14am (06/19/2020),             Assessment:         Z00.00   Encounter for general adult medical examination without abnormal findings       Z13.31   Encounter for screening for depression       M54.5   Low back pain       Z79.899   Other long term (current) drug therapy       E11.65   Type 2 diabetes mellitus with hyperglycemia       I10   HTN - Essential (primary) hypertension       E78.2   HLD - Mixed hyperlipidemia       E03.9   Hypothyroidism, unspecified       M10.9   Gout, unspecified       J30.9   Allergic rhinitis, unspecified           ORDERS:         Radiology/Test Orders:       3017F  Colorectal CA screen results documented and reviewed (PV)  (In-House)            2022F  Dilated retinal eye exam w/interpret by ophthalmologist/optometrist documented/reviewed (DM)4  (In-House)            54312  Pro services for allergen immunotherapy not including provision of allergenic extracts; 2+ injection  (In-House)    serum expires 12-2-2020 Pt is on Metoprolol./pr          Lab Orders:       02027  URIC - [facility] Uric Acid, Serum  (Send-Out)            65968  DIAB1 - [facility] LIPID,CMP, A1C: 46178, 09585, 19491  (Send-Out)            91141  BRIANDA - [facility] Microablbumin, quantitative  (Send-Out)            94737  TSH - [facility] TSH  (Send-Out)            54827  Drug test prsmv qual dir optical obs per day  (In-House)            APPTO  Appointment need  (In-House)              Procedures Ordered:         Annual wellness visit, includes a PPPS, subsequent visit  (In-House)              Other Orders:         Depression screen negative  (In-House)            1101F  Pt screen for fall risk; document no falls in past year or only 1 fall w/o injury in past year (MAYRA)  (In-House)          "     Screening mammogram results documented  (Send-Out)            1124F  Advance Care Planning discussed and doc in MR; no surrogate named or advance care plan provided  (Send-Out)                      Plan:         Encounter for general adult medical examination without abnormal findingsShe is due for colonoscopy, last done 6/2016 and this showed tubular adenoma x2.  Three year repeat recommended.  She does say she will get this done, but declines to have us schedule this for her today.  I have strongly encouraged her to get set up with Dr. Spencer for this ASAP.  Pap smear is no longer indicated by age and history. She is due for mammogram, last done 4/2019 and this was normal.  She is rescheduled for that in July 9th.  She is UTD on DEXA, last done 4/2019 and this was normal.  She is UTD on Pneumovax (2/2008), Prevnar (9/2016), Zostavax (9/2012), and flu (10/2019).  She is due for Shingrix and Td (2008); can be done at the pharmacy and [facility], [facility].  She is due for routine labs including DM panel, TSH and uric acid; ordered.  She is UTD on eye exam (9/2019) and foot exam (9/2019).  No fall risk, no memory issues, no signs/symptoms of depression.  She lives with her .  She is able to drive and perform ADLs/manage finances independently.  Hearing is adequate.  She has a living will, but not in the chart.  Preventive services handout and safety handout were given to her.  Current doctor list updated.  RTC 3 months.    MIPS PHQ-9 Depression Screening: Completed form scanned and in chart; Total Score 2; Negative Depression Screen ADVANCED DIRECTIVES: None         FOLLOW-UP: Schedule a follow-up visit in 3 months.:.  f/u low back pain with [place]          Orders:         Annual wellness visit, includes a PPPS, subsequent visit  (In-House)              Depression screen negative  (In-House)            1101F  Pt screen for fall risk; document no falls in past year or only 1 fall w/o  injury in past year (MAYRA)  (In-House)              Screening mammogram results documented  (Send-Out)            3017F  Colorectal CA screen results documented and reviewed (PV)  (In-House)            2022F  Dilated retinal eye exam w/interpret by ophthalmologist/optometrist documented/reviewed (DM)4  (In-House)            1124F  Advance Care Planning discussed and doc in MR; no surrogate named or advance care plan provided  (Send-Out)      HAS BUT NOT IN CHART      APPTO  Appointment need  (In-House)              Low back painStable on current regimen.  Sx well controlled.  No adverse effects.  She does require ongoing use of this controlled substance to function.  Tox screen and Bran run today.  No refills needed.  RTC 3 months.        Other long term (current) drug therapy    Controlled substance documentation: Bran reviewed; drug screen performed and appropriate; consent is reviewed and signed and on the chart.  She is aware of risk of addiction on this medication, understands that she will need to follow up for a review every 3 months and her medications will be adjusted or decreased as deemed appropriate at each visit.  No history of drug or alcohol abuse.  No concerns about diversion or abuse. She denies side effects related to the medication.  She is aware that she may be called in for pill counts.  The dosing of this medication will be reviewed on a regular basis and reduced if possible..  Ongoing use of a controlled substance is necessary for this patient to have a normal quality of life           Orders:       50848  Drug test prsmv qual dir optical obs per day  (In-House)              Type 2 diabetes mellitus with hyperglycemia    LABORATORY:  Labs ordered to be performed today include Diabetes Panel 1; CMP, Lipid, A1C and Microalbumin.            Orders:       63064  DIAB1 - [facility] LIPID,CMP, A1C: 96909, 50638, 71174  (Send-Out)            00146  BRIANDA - [facility] Microablbumin, quantitative  (Send-Out)               Hypothyroidism, unspecified    LABORATORY:  Labs ordered to be performed today include TSH.            Orders:       54793  TSH - [facility] TSH  (Send-Out)              Gout, unspecified    LABORATORY:  Labs ordered to be performed today include uric acid.            Orders:       97845  URIC - [facility] Uric Acid, Serum  (Send-Out)              Allergic rhinitis, unspecified          Orders:       70268  Pro services for allergen immunotherapy not including provision of allergenic extracts; 2+ injection  (In-House)    serum expires 12-2-2020 Pt is on Metoprolol./pr              Patient Recommendations:        For  Encounter for general adult medical examination without abnormal findings:    Schedule a follow-up visit in 3 months.                APPOINTMENT INFORMATION:        Monday Tuesday Wednesday Thursday Friday Saturday Sunday            Time:___________________AM  PM   Date:_____________________             Charge Capture:         Primary Diagnosis:     Z00.00  Encounter for general adult medical examination without abnormal findings           Orders:        Annual wellness visit, includes a PPPS, subsequent visit  (In-House)              Depression screen negative  (In-House)            1101F  Pt screen for fall risk; document no falls in past year or only 1 fall w/o injury in past year (MAYRA)  (In-House)            3017F  Colorectal CA screen results documented and reviewed (PV)  (In-House)            2022F  Dilated retinal eye exam w/interpret by ophthalmologist/optometrist documented/reviewed (DM)4  (In-House)            APPTO  Appointment need  (In-House)              Z13.31  Encounter for screening for depression     M54.5  Low back pain     Z79.899  Other long term (current) drug therapy           Orders:      41251  Drug test prsmv qual dir optical obs per day  (In-House)              E11.65  Type 2 diabetes mellitus with hyperglycemia     I10  HTN - Essential (primary) hypertension      E78.2  HLD - Mixed hyperlipidemia     E03.9  Hypothyroidism, unspecified     M10.9  Gout, unspecified     J30.9  Allergic rhinitis, unspecified           Orders:      97448  Pro services for allergen immunotherapy not including provision of allergenic extracts; 2+ injection  (In-House)    serum expires 12-2-2020 Pt is on Metoprolol./pr

## 2021-05-18 NOTE — PROGRESS NOTES
Mary Chopra 1940     Office/Outpatient Visit    Visit Date: Mon, Mar 18, 2019 08:49 am    Provider: Sakina Donohue MD (Assistant: Lisset Gan MA)    Location: [facility]        Electronically signed by Sakina Donohue MD on  03/18/2019 08:34:04 PM                             SUBJECTIVE:        CC:     Mrs. Chopra is a 78 year old White female.  presents today due to complaints of cough, fever, fatigue X 1 week         HPI:     Mary is here today with c/o cough and fever for the past 1 week.  +Fatigue and malaise, +fevers (Tmax 103) and chills.  +Mild headaches.  +Rhinorrhea (clear), congestion.  No sore throat.  +Cough, copious purulent sputum.  No CP.  +SOB, +wheezing.  No abd pain, no N/V, +diarrhea.  No body aches.  Sick contacts: none known.  She does have asthma.     ROS:     CONSTITUTIONAL:  Positive for chills, fatigue, fever and malaise.      EYES:  Negative for blurred vision.      E/N/T:  Positive for nasal congestion and frequent rhinorrhea.   Negative for ear pain or sore throat.      CARDIOVASCULAR:  Negative for chest pain and palpitations.      RESPIRATORY:  Positive for recent cough, dyspnea and frequent wheezing.      GASTROINTESTINAL:  Positive for diarrhea.   Negative for abdominal pain, nausea or vomiting.      MUSCULOSKELETAL:  Negative for myalgias.          PM/[facility]/:     Last Reviewed on 3/18/2019 08:56 AM by Sakina Donohue    Past Medical History:             PREVENTIVE HEALTH MAINTENANCE             BONE DENSITY: was last done 4/7/16 with normal results     COLORECTAL CANCER SCREENING: Up to date (colonoscopy q10y; sigmoidoscopy q5y; Cologuard q3y) was last done 6/14/16, Results are in chart; colonoscopy with the following abnormalities noted-- tubular adenoma x 2; The next colonoscopy is due  6/2019     DENTAL CLEANING: Refuses due to fear     EYE EXAM: Diabetic Eye Exam during this calendar year and results are in chart was last done 8/24/2018 NO  diabetic retinopathy     MAMMOGRAM: Done within last 2 years and results in are chart was last done 4/12/18 with normal results     PAP SMEAR: was last done 12/19/13 with normal results No longer indicated due to age and history         PAST MEDICAL HISTORY         Positive for    Hyperlipidemia and    Hypertension;     Positive for    Pulmonary Hypertension and    RAD;     Postive for    Gastritis and    Esophageal ulcers;     Positive for    Hematuria with malrotation Rt kidney;     Positive for    DDD L-spine, Rt gluteal tendonitis and    Lumbar Radiculitis and L4-L5 Spondylosis '15;     Positive for    Type 2 Diabetes and    Hypothyroidism;     Positive for    Prominent Rt hepatic lobe '15;         CURRENT MEDICAL PROVIDERS:    Allergist: Dr Everett    Cardiologist: Dr Rae-not seen since 2015    Dermatologist: Dr Baumann    General Surgeon: Dr Spencer    Ophthalmologist: Dr Cowan             ADVANCE DIRECTIVES: Living will         Surgical History:         Positive for    Hysterectomy: Abdominal; Partial; ;     Positive for    Trochanter bursa injection '10;,    L-sine disc and cyst removal '10 (L4-5 and L5-S1);,    L-spine epidural; and    Cystoscopy '05;;         Family History: NO colon, NO Breast, NO Ovarian, NO Prostate Cancer;  CAD;  CVA;         Social History:     Occupation:    Retired     Marital Status:          Tobacco/Alcohol/Supplements:     Last Reviewed on 3/18/2019 08:56 AM by Sakina Donohue    Tobacco: She has a past history of cigarette smoking; quit date:  Quit Date 1990.  No Etoh;         Substance Abuse History:     Last Reviewed on 3/18/2019 08:56 AM by Sakina Donohue        Mental Health History:     Last Reviewed on 3/18/2019 08:56 AM by Sakina Donohue        Communicable Diseases (eg STDs):     Last Reviewed on 3/18/2019 08:56 AM by Sakina Donohue            Current Problems:     Last Reviewed on 3/11/2019 02:03 PM by Sakina Donohue    Hemangioma, unspecified site     Memory  loss NOS     Hyperlipidemia     Fatigue     Gout, unspecified     Joint pain, multiple sites     Patient visit for long term (current) drug use; other     Chronic low back pain     Hip pain     Asthma     Migraine, unspecified, without mention of intractable migraine without mention of status migrainosus     Type 2 DM     Adenomatous colon polyps     Hypothyroidism     Morbid obesity     Pulmonary HTN     Diverticulosis     Hematuria, unspecified     HTN     Allergic rhinitis     Screening for depression     Other specified administrative purpose         Immunizations:     Td adult 7/1/2008     Prevnar 13 (Pneumococcal PCV 13) 9/28/2016     Fluzone High-Dose pf (>=65 yr) 9/28/2016     Fluzone High-Dose pf (>=65 yr) 11/8/2017     Fluzone High-Dose pf (>=65 yr) 10/15/2018     PNEUMOVAX 23 (Pneumococcal PPV23) 2/1/2008     Zostavax (Zoster live) 9/1/2012         Allergies:     Last Reviewed on 3/11/2019 02:03 PM by Sakina Donohue    Penicillins:    Sulfonamides:    Keflex:    Claritin:    Monopril:    Serevent (discontinued, use Serevent Diskus):    Avelox:    Crestor: joint pain        Current Medications:     Last Reviewed on 3/11/2019 02:03 PM by Sakina Donohue    Metformin HCl 500mg Tablet 2 po q am and 1 po q evening     Montelukast Sodium 10mg Tablet 1 tab daily     Synthroid 0.05mg Tablet 1 tablet Monday-Friday, then 1.5 tablets Saturday and Sunday     Glimepiride 4mg Tablet Take 1 tablet(s) by mouth bid     Tricor 48mg Tablet Take 1 tablet(s) by mouth daily     Omeprazole 20mg Tablets, Delayed Release 1 capsule daily     Benazepril HCl 40mg Tablet Take 1 tablet(s) by mouth daily     Hydrocodone/Acetaminophen 7.5mg/325mg Tablet 1 po qd prn     Torsemide 20mg Tablet Take 1 tablet(s) by mouth daily     Klor-Con 10 10mEq Tablets, Extended Release Take 1 tablet(s) by mouth daily     Metoprolol Succinate 50mg Tablets, Extended Release Take 1 tablet(s) by mouth daily     Humulin N 100units/1ml Cartridges 18 units in  AM and 10 units QHS     Doxazosin Mesylate 8mg Tablet Take 1 tablet(s) by mouth daily     Levothyroxine Sodium 50mcg Capsules 1 tab M-F 1 1/2 tab Sat and Sun     BD Ultra-Fine 6mm Needle 31G Insulin Syringe 0.3ml Syringe Use with insulin TID and prn Dx E11.9     Fluticasone Propionate 50mcg/1actuation Nasal Spray 1 or 2 sprays in each nostril qday     Zetia 10mg Tablet 1 tab daily (Pt Asst)     Ketotifen 0.025% Ophthalmic Solution 1 drop to both eyes bid     OTC Xyzal 10mg Take one tablet once daily     allergy injections every 2-7 days     Fish Oil 1,000mg Softgel capsule 1 / day     Vitamin D3 1,000IU Capsules 2 daily     Aspirin (ASA) 81mg Tablets, Enteric Coated 1 tab daily     Mucinex 600mg Tablets, Extended Release 1-2 tabs daily PRN     Glucose Reagent Blood Test Strips  Reagent Strips Check blood sugar three times daily     Trazodone HCl 50mg Tablet 1 tab HS PRN     Advair Diskus         OBJECTIVE:        Vitals:         Current: 3/18/2019 8:54:20 AM    Ht:  5 ft, 1.5 in;  Wt: 215.4 lbs;  BMI: 40.0    T: 97.6 F (oral);  BP: 136/81 mm Hg (left arm, sitting);  P: 93 bpm (right arm (BP Cuff), sitting);  sCr: 1.02 mg/dL;  GFR: 52.41        Exams:     PHYSICAL EXAM:     GENERAL: vital signs recorded - well developed, well nourished;  well groomed;  no apparent distress;     EYES: extraocular movements intact; conjunctiva and cornea are normal; PERRLA;     E/N/T: EARS:  normal external auditory canals and tympanic membranes;  grossly normal hearing; NOSE: nasal mucosa is edematous and erythematous;  OROPHARYNX: oral mucosa reveals erythema;  posterior pharynx shows no exudate;     RESPIRATORY: normal respiratory rate and pattern with no distress; diffuse expiratory wheezes;     CARDIOVASCULAR: normal rate; rhythm is regular;  no systolic murmur;     GASTROINTESTINAL: nontender; normal bowel sounds;     LYMPHATIC: no enlargement of cervical or facial nodes;     MUSCULOSKELETAL: normal gait; normal overall tone          Lab/Test Results:             Influenza A:  Positive (03/18/2019),     Influenza B:  Negative (03/18/2019),     Performed by::  tls (03/18/2019),             ASSESSMENT           493.00   J45.41  Asthma              DDx:     786.2   R05  Cough              DDx:         ORDERS:         Meds Prescribed:       Albuterol 0.083% Nebulizer Solution 1 vial q 4 hours prn  #45 (Forty Five) vial(s) Refills: 0       Prednisone 20mg Tablet 2 PO daily x 5 days  #10 (Ten) tablet(s) Refills: 0       Doxycycline Monohydrate 100mg Capsules 1 tab PO BID x 5 days  #10 (Ten) capsule(s) Refills: 0         Radiology/Test Orders:       3014F  Screening mammography results documented and reviewed (PV)1  (In-House)         3017F  Colorectal CA screen results documented and reviewed (PV)  (In-House)         2022F  Dilated retinal eye exam w/interpret by ophthalmologist/optometrist documented/reviewed (DM)4  (In-House)           Lab Orders:       [id number]  Infectious agent antigen detection by immunoassay; Influenza  (In-House)         77089  Infectious agent antigen detection by immunoassay; Influenza  (In-House)           Other Orders:         Calculated BMI above the upper parameter and a follow-up plan was documented in the medical record  (In-House)                   PLAN:          Asthma Rapid flu positive but she is outside the window for Tamiflu.  It has triggered an asthma exacerbation.  Given copious purulent sputum, I am going to treat with antibiotics as well as steroids to cover possible bacterial pneumonia, even though I do not hear any focal signs on auscultation.  She is wheezing on exam, but states she has not been using any inhalers (rescue or maintenance) because she cannot hold her breath due to coughing.  I have given her a rx for a nebulizer today and will send in albuterol nebs to use with it.  Symptomatic care recommended with OTC analgesics for pain/fever, OTC decongestants and cough suppressants prn.  Stay  well hydrated.  Humidifier in the bedroom at night.  Hot tea with lemon and honey.  RTC prn worsening or failure to improve of sx.     MIPS Vaccines Flu and Pneumonia updated in Shot record    DIABETIC EYE EXAM: Diabetic Eye Exam during this calendar year and results are in chart     MAMMOGRAM: Done within last 2 years and results in are chart     COLORECTAL CANCER SCREENING: Results are in chart     BMI Elevated - Follow-Up Plan: She was provided education on weight loss strategies           Prescriptions:       Albuterol 0.083% Nebulizer Solution 1 vial q 4 hours prn  #45 (Forty Five) vial(s) Refills: 0       Prednisone 20mg Tablet 2 PO daily x 5 days  #10 (Ten) tablet(s) Refills: 0       Doxycycline Monohydrate 100mg Capsules 1 tab PO BID x 5 days  #10 (Ten) capsule(s) Refills: 0           Orders:       3014F  Screening mammography results documented and reviewed (PV)1  (In-House)         3017F  Colorectal CA screen results documented and reviewed (PV)  (In-House)           Calculated BMI above the upper parameter and a follow-up plan was documented in the medical record  (In-House)         2022F  Dilated retinal eye exam w/interpret by ophthalmologist/optometrist documented/reviewed (DM)4  (In-House)             Patient Education Handouts:       [facility] Medication Compliance           Cough           Orders:       [id number]  Infectious agent antigen detection by immunoassay; Influenza  (In-House)         [id number]  Infectious agent antigen detection by immunoassay; Influenza  (In-House)               CHARGE CAPTURE           **Please note: ICD descriptions below are intended for billing purposes only and may not represent clinical diagnoses**        Primary Diagnosis:         493.00 Asthma            J45.41    Moderate persistent asthma with (acute) exacerbation              Orders:          31882   Office/outpatient visit; established patient, level 3  (In-House)             3014F   Screening mammography results  documented and reviewed (PV)1  (In-House)             3017F   Colorectal CA screen results documented and reviewed (PV)  (In-House)                Calculated BMI above the upper parameter and a follow-up plan was documented in the medical record  (In-House)             2022F   Dilated retinal eye exam w/interpret by ophthalmologist/optometrist documented/reviewed (DM)4  (In-House)           786.2 Cough            R05    Cough              Orders:          [id number]  Infectious agent antigen detection by immunoassay; Influenza  (In-House)             [id number]   Infectious agent antigen detection by immunoassay; Influenza  (In-House)

## 2021-05-18 NOTE — PROGRESS NOTES
"Mary Chopra. 1940     Office/Outpatient Visit    Visit Date: Tue, Jun 12, 2018 03:09 pm    Provider: Sakina Donohue MD (Assistant: Kaylie Harris MA)    Location: [facility]        Electronically signed by Sakina Donohue MD on  06/12/2018 09:14:47 PM                             SUBJECTIVE:        CC: f/u DM, HTN         HPI: Mary is here today for follow-up of chronic issues.          She is on metformin, glimepiride, and Humulin-N for diabetes.  She follows with Roslyn Tovar.  She is on ASA and an ACEI.  She has not been able to tolerate statins but is on Zetia for HLD.  She has been off for a few months because she ran out but is back on now.  She is UTD on eye exam, last done 8/2017.  She is UTD on foot exam (4/2017).  Last A1c was 7.5 back in November.        She is on benazepril, metoprolol succinate, and doxazosin for HTN.  BP has been going up and down.  No CP, SOB, palpitations.        She is on levothyroxine for hypothyroidism.        She is on Singulair, Flonase, and azelastine for allergies.  She always has congestion chronically.  Her allergies have been \"just awful\" this year.  She was seeing an allergist in the past, but stopped because she got tired \"of taking allergy shots for 35 years.\"  +Cough.        She is on prn hydrocodone/APAP for left shoulder pain.  She has been on oxycodone/APAP in the past but has weaned down to the Norco.  She uses #30 tabs every 6 months or so.  She uses the pain pills as a last resort, typically trying Tylenol or a warm bath first.        She is on allopurinol for gout.     ROS:     CONSTITUTIONAL:  Positive for fatigue.   Negative for fever.      EYES:  Negative for blurred vision.      E/N/T:  Positive for nasal congestion, frequent rhinorrhea and sore throat.   Negative for diminished hearing.      CARDIOVASCULAR:  Negative for chest pain and palpitations.      RESPIRATORY:  Positive for recent cough.   Negative for dyspnea.      " GASTROINTESTINAL:  Negative for abdominal pain, constipation, diarrhea, nausea and vomiting.      MUSCULOSKELETAL:  Positive for arthralgias, back pain, joint stiffness and myalgias.      NEUROLOGICAL:  Negative for paresthesias and weakness.          PMH/FMH/SH:     Last Reviewed on 6/12/2018 03:22 PM by Sakina Donohue    Past Medical History:             PREVENTIVE HEALTH MAINTENANCE             BONE DENSITY: was last done 4/7/16 with normal results     COLORECTAL CANCER SCREENING: Up to date (colonoscopy q10y; sigmoidoscopy q5y; Cologuard q3y) was last done 6/14/16, Results are in chart; colonoscopy with the following abnormalities noted-- tubular adenoma x 2; The next colonoscopy is due  6/2019     DENTAL CLEANING: Refuses due to fear     EYE EXAM: Diabetic Eye Exam during this calendar year and results are in chart was last done 8/22/17     MAMMOGRAM: Done within last 2 years and results in are chart was last done 4/12/18 with normal results     PAP SMEAR: was last done 12/19/13 with normal results No longer indicated due to age and history         PAST MEDICAL HISTORY         Positive for    Hyperlipidemia and    Hypertension;     Positive for    Pulmonary Hypertension and    RAD;     Postive for    Gastritis and    Esophageal ulcers;     Positive for    Hematuria with malrotation Rt kidney;     Positive for    DDD L-spine, Rt gluteal tendonitis and    Lumbar Radiculitis and L4-L5 Spondylosis '15;     Positive for    Type 2 Diabetes and    Hypothyroidism;     Positive for    Prominent Rt hepatic lobe '15;         CURRENT MEDICAL PROVIDERS:    Cardiologist: Dr Rae-not seen since 2015    Dermatologist: Dr Baumann    General Surgeon: Dr Spencer    Ophthalmologist: Dr Cowan             ADVANCE DIRECTIVES: Living will         Surgical History:         Positive for    Hysterectomy: Abdominal; Partial; ;     Positive for    Trochanter bursa injection '10;,    L-sine disc and cyst removal '10 (L4-5 and L5-S1);,     L-spine epidural; and    Cystoscopy '05;;         Family History: NO colon, NO Breast, NO Ovarian, NO Prostate Cancer;  CAD;  CVA;         Social History:     Occupation:    Retired     Marital Status:          Tobacco/Alcohol/Supplements:     Last Reviewed on 6/12/2018 03:22 PM by Sakina Donohue    Tobacco: She has a past history of cigarette smoking; quit date:  Quit Date 1990.  No Etoh;         Substance Abuse History:     Last Reviewed on 6/12/2018 03:22 PM by Sakina Donohue        Mental Health History:     Last Reviewed on 6/12/2018 03:22 PM by Sakina Donohue        Communicable Diseases (eg STDs):     Last Reviewed on 6/12/2018 03:22 PM by Sakina Donohue            Current Problems:     Last Reviewed on 2/07/2018 11:53 AM by Sakina Donohue    Hemangioma, unspecified site     Memory loss NOS     Hyperlipidemia     Fatigue     Gout, unspecified     Joint pain, multiple sites     Patient visit for long term (current) drug use; other     Chronic low back pain     Hip pain     Asthma     Migraine, unspecified, without mention of intractable migraine without mention of status migrainosus     Type 2 DM     Adenomatous colon polyps     Hypothyroidism     Morbid obesity     Pulmonary HTN     Diverticulosis     Hematuria, unspecified     HTN     Allergic rhinitis     Other specified administrative purpose         Immunizations:     Td adult 7/1/2008     zzPrevnar-13 9/28/2016     Fluzone High-Dose pf (>=65 yr) 9/28/2016     Fluzone High-Dose pf (>=65 yr) 11/8/2017     PNEUMOVAX 23 (Pneumococcal PPV23) 2/1/2008     Zostavax (Zoster) 9/1/2012         Allergies:     Last Reviewed on 2/07/2018 11:53 AM by Sakina Donohue    Penicillins:    Sulfonamides:    Keflex:    Claritin:    Monopril:    Serevent (discontinued, use Serevent Diskus):    Avelox:    Crestor: joint pain        Current Medications:     Last Reviewed on 2/07/2018 11:53 AM by Sakina Donohue    Klor-Con 10 10mEq Tablets, Extended Release Take 1  tablet(s) by mouth daily     Metoprolol Succinate 50mg Tablets, Extended Release Take 1 tablet(s) by mouth daily     Allopurinol 300mg Tablet 1 tab daily     Metformin HCl 500mg Tablet 2 po q am and 1 po q evening     Doxazosin Mesylate 8mg Tablet Take 1 tablet(s) by mouth daily     Glimepiride 4mg Tablet Take 1 tablet(s) by mouth bid     Levothyroxine Sodium 50mcg Capsules 1 tab M-F 1 1/2 tab Sat and Sun     Tricor 48mg Tablet Take 1 tablet(s) by mouth daily     Humulin N 100units/1ml Injection 15 units QAM and 13 units QHS PLUS sliding scale insulin, 1 unit for BG >175, 2 units >200, 3 units >225 (max 6 units daily),     Montelukast Sodium 10mg Tablet 1 tab daily     Hydrocodone/Acetaminophen 7.5mg/325mg Tablet 1 po qd prn     Benazepril HCl 40mg Tablet Take 1 tablet(s) by mouth daily     Torsemide 20mg Tablet Take 1 tablet(s) by mouth daily     Fluticasone Propionate 50mcg/1actuation Nasal Spray 1 or 2 sprays in each nostril qday     Magnesium Oxide 400mg Tablet Take 1 tablet(s) by mouth daily     Zetia 10mg Tablet 1 tab daily (Pt Asst)     Fish Oil 1,000mg Softgel capsule 1 / day     Omeprazole 20mg Tablets, Delayed Release 1 capsule daily     Vitamin D3 1,000IU Capsules 2 daily     Aspirin (ASA) 81mg Tablets, Enteric Coated 1 tab daily     Mucinex 600mg Tablets, Extended Release 1-2 tabs daily PRN     BD Ultra-Fine 6mm Needle 31G Insulin Syringe 0.3ml Syringe Use with insulin TID and prn Dx E11.9         OBJECTIVE:        Vitals:         Current: 6/12/2018 3:13:53 PM    Ht:  5 ft, 1.5 in;  Wt: 225.6 lbs;  BMI: 41.9    T: 98 F (oral);  BP: 138/68 mm Hg (left arm, sitting);  P: 62 bpm (left arm (BP Cuff), sitting);  sCr: 1.03 mg/dL;  GFR: 53.74    O2 Sat: 96 % (room air)        Exams:     PHYSICAL EXAM:     GENERAL: vital signs recorded - well developed, well nourished;  well groomed;  no apparent distress;     EYES: extraocular movements intact; conjunctiva and cornea are normal; PERRLA;     E/N/T: OROPHARYNX:   normal mucosa, dentition, gingiva, and posterior pharynx;     RESPIRATORY: normal respiratory rate and pattern with no distress; normal breath sounds with no rales, rhonchi, wheezes or rubs;     CARDIOVASCULAR: normal rate; rhythm is regular;  no systolic murmur; no edema;     GASTROINTESTINAL: nontender; normal bowel sounds;     LYMPHATIC: no enlargement of cervical or facial nodes;     MUSCULOSKELETAL: normal gait; normal overall tone         Lab/Test Results:             Urine temperature:  confirmed (06/12/2018),     All urine drug screen levels confirmed negative:  yes (06/12/2018),     Date and time of last pill:  Hydrocodone sveral weeks ago (06/12/2018),     Performed by:  tls (06/12/2018),     Collection Time:  1600 (06/12/2018),             ASSESSMENT           719.49   M25.50  Joint pain, multiple sites              DDx:     V58.69   Z79.891   Z79.899  Patient visit for long term (current) drug use; other              DDx:     250.00   E11.9  Type 2 DM              DDx:     401.1   I10  HTN              DDx:     244.9   E03.9  Hypothyroidism              DDx:     477.9   J30.9  Allergic rhinitis              DDx:         ORDERS:         Meds Prescribed:       Refill of: Hydrocodone/Acetaminophen 7.5mg/325mg Tablet 1 po qd prn  #30 (Thirty) tablet(s) Refills: 0         Lab Orders:       54093  TSH - [facility] TSH  (Send-Out)         53704  Drug test prsmv qual dir optical obs per day  (In-House)         APPTO  Appointment need  (In-House)         13026  DIAB1 - [facility] LIPID,CMP, A1C: 63145, 02674, 61825  (Send-Out)           Procedures Ordered:       REFER  Referral to Specialist or Other Facility  (Send-Out)           Other Orders:         Calculated BMI above the upper parameter and a follow-up plan was documented in the medical record  (In-House)                   PLAN:          Joint pain, multiple sites She is on hydrocodone/APAP prn for chronic joint pain, worst in the knees.  She does require ongoing  use of this controlled substance to function.  No adverse effects.  Tox screen and Bran run today.  Refills sent.  RTC 3 months.         FOLLOW-UP: Schedule a follow-up visit in 3 months..  f/u joint pain with [place]           Prescriptions:       Refill of: Hydrocodone/Acetaminophen 7.5mg/325mg Tablet 1 po qd prn  #30 (Thirty) tablet(s) Refills: 0           Orders:       APPTO  Appointment need  (In-House)             Patient Education Handouts:       [facility] Medication Compliance           Patient visit for long term (current) drug use; other     Controlled substance documentation: Bran reviewed; drug screen performed and appropriate; consent is reviewed and signed and on the chart.  She is aware of risk of addiction on this medication, understands that she will need to follow up for a review every 3 months and her medications will be adjusted or decreased as deemed appropriate at each visit.  No history of drug or alcohol abuse.  No concerns about diversion or abuse. She denies side effects related to the medication.  She is aware that she may be called in for pill counts.  The dosing of this medication will be reviewed on a regular basis and reduced if possible..  Ongoing use of a controlled substance is necessary for this patient to have a normal quality of life Drug screen           Orders:       87731  Drug test prsmv qual dir optical obs per day  (In-House)            Type 2 DM She is on metformin, glimepiride, and Humulin-N for diabetes.  No refills needed.  She follows with Roslyn Tovar.  She is on ASA and an ACEI.  She has not been able to tolerate statins but is on Zetia for HLD.  She has been off for a few months because she ran out but is back on now.  She is UTD on eye exam, last done 8/2017.  She is UTD on foot exam (4/2017).  Last A1c was 7.5 back in November.  Rechecking labs today as below.     LABORATORY:  Labs ordered to be performed today include Diabetes Panel 1; CMP, Lipid, A1C.  MIPS      "BMI Elevated - Follow-Up Plan: She was provided education on weight loss strategies           Orders:         Calculated BMI above the upper parameter and a follow-up plan was documented in the medical record  (In-House)         75073  DIAB - [facility] LIPID,CMP, A1C: 36283, 50094, 40855  (Send-Out)            HTN BP at goal.  No refills needed.  Checking labs.          Hypothyroidism Checking labs.  No refills needed.     LABORATORY:  Labs ordered to be performed today include TSH.            Orders:       13642  TSH - [facility] TSH  (Send-Out)            Allergic rhinitis She is miserable.  She has been on allergy shots in the past through Dr. Mark Espinoza but \"just wanted to get off the allergy shots.\"  She is now ready to go back to him and see about getting back on the allergy shots.  Referral placed.         REFERRALS:  Referral initiated to an allergist ( Dr. Dr. Espinoza; for evaluation of refractory allergic rhinitis (has been on allergy shots in the past) ).            Orders:       REFER  Referral to Specialist or Other Facility  (Send-Out)               Patient Recommendations:        For  Joint pain, multiple sites:     Schedule a follow-up visit in 3 months.                APPOINTMENT INFORMATION:        Monday Tuesday Wednesday Thursday Friday Saturday Sunday            Time:___________________AM  PM   Date:_____________________             CHARGE CAPTURE           **Please note: ICD descriptions below are intended for billing purposes only and may not represent clinical diagnoses**        Primary Diagnosis:         719.49 Joint pain, multiple sites            M25.50    Pain in unspecified joint              Orders:          79961   Office/outpatient visit; established patient, level 4  (In-House)             APPTO   Appointment need  (In-House)           V58.69 Patient visit for long term (current) drug use; other            Z79.891    Long term (current) use of opiate analgesic           Z79.899    " Other long term (current) drug therapy              Orders:          43324   Drug test prsmv qual dir optical obs per day  (In-House)           250.00 Type 2 DM            E11.9    Type 2 diabetes mellitus without complications              Orders:             Calculated BMI above the upper parameter and a follow-up plan was documented in the medical record  (In-House)           401.1 HTN            I10    Essential (primary) hypertension    244.9 Hypothyroidism            E03.9    Hypothyroidism, unspecified    477.9 Allergic rhinitis            J30.9    Allergic rhinitis, unspecified        ADDENDUMS:      ____________________________________    Date: 06/13/2018 12:51 PM    Author: Ruthy Beatty         Visit Note Faxed to:        User Entered Recipient; Number [phone]

## 2021-05-18 NOTE — PROGRESS NOTES
Mary Chopra  1940     Office/Outpatient Visit    Visit Date: Thu, Dec 12, 2019 02:21 pm    Provider: Sakina Donohue MD (Assistant: Malena Khan MA)    Location: [facility]        Electronically signed by Sakina Donohue MD on  12/12/2019 10:27:09 PM                             Subjective:        CC: Mrs. Chopra is a 79 year old White female.  This is a follow-up visit.  3 months;         HPI: Mary is here today to follow up on chronic issues.          She is on metformin and Humulin-N for diabetes, taking 16 units QAM and 12 units QHS.  However, she has been having BG drop down to the 40s overnight.  She previously followed with Roslyn Tovar.  She is on ASA and an ACEI.  She has not been able to tolerate statins but is on Zetia for HLD.  She is UTD on eye exam, last done 9/2019  She is UTD on foot exam (9/2019).        She is on benazepril, metoprolol succinate, and doxazosin for HTN.  No CP, SOB, palpitations.        She is on levothyroxine for hypothyroidism.  No heat/cold intolerance.  She is having a lot of very dry skin.        She is on Singulair, Flonase, and azelastine for allergies.  She was started on Advair for asthma but can't take it regularly because she could not afford it.  She has chronic congestion.        She is on prn hydrocodone/APAP for left shoulder pain.  She has been on oxycodone/APAP in the past but has weaned down to the Norco.  She uses #30 tabs every 6 months or so and last got a refill in Dec.          She went off of allopurinol for gout.  No recent flares. However, her uric acid went up high when she did so, so we started her back up.    ROS:     CONSTITUTIONAL:  Positive for unintentional weight gain ( gradual ).   Negative for fatigue or fever.      EYES:  Negative for blurred vision.      E/N/T:  Positive for nasal congestion and frequent rhinorrhea.   Negative for ear pain or sore throat.      CARDIOVASCULAR:  Negative for chest pain and  palpitations.      RESPIRATORY:  Negative for recent cough and dyspnea.      GASTROINTESTINAL:  Negative for constipation, diarrhea, nausea and vomiting.      MUSCULOSKELETAL:  Positive for arthralgias and back pain.   Negative for myalgias.      INTEGUMENTARY/BREAST:  Positive for extremely dry skin.      NEUROLOGICAL:  Negative for paresthesias and weakness.          Past Medical History / Family History / Social History:         Last Reviewed on 12/12/2019 03:00 PM by Sakina Donohue    Past Medical History:             PREVENTIVE HEALTH MAINTENANCE             BONE DENSITY: was last done 4/24/2019 with normal results     COLORECTAL CANCER SCREENING: Up to date (colonoscopy q10y; sigmoidoscopy q5y; Cologuard q3y) was last done 6/14/16, Results are in chart; colonoscopy with the following abnormalities noted-- tubular adenoma x 2; The next colonoscopy is due  6/2019     DENTAL CLEANING: Refuses due to fear     EYE EXAM: Diabetic Eye Exam during this calendar year and results are in chart was last done 9/23/19 NO diabetic retinopathy     MAMMOGRAM: Done within last 2 years and results in are chart was last done 4/24/19 with normal results     PAP SMEAR: was last done 12/19/13 with normal results No longer indicated due to age and history         PAST MEDICAL HISTORY         Positive for    Hyperlipidemia and    Hypertension;     Positive for    Pulmonary Hypertension and    RAD;     Postive for    Gastritis and    Esophageal ulcers;     Positive for    Hematuria with malrotation Rt kidney;     Positive for    DDD L-spine, Rt gluteal tendonitis and    Lumbar Radiculitis and L4-L5 Spondylosis '15;     Positive for    Type 2 Diabetes and    Hypothyroidism;     Positive for    Cataract: bilateral; declines surgery; and    Prominent Rt hepatic lobe '15;         CURRENT MEDICAL PROVIDERS:    Allergist: Dr Everett    Cardiologist: Dr Rae-not seen since 2015    Dermatologist: Dr Baumann    General Surgeon: Dr Spencer     Ophthalmologist: Dr Cowan             ADVANCE DIRECTIVES: Living will         Surgical History:         Positive for    Hysterectomy: Abdominal; Partial; ;     Positive for    Trochanter bursa injection '10;,    L-sine disc and cyst removal '10 (L4-5 and L5-S1);,    L-spine epidural; and    Cystoscopy '05;;         Family History: NO colon, NO Breast, NO Ovarian, NO Prostate Cancer;  CAD;  CVA;         Social History:     Occupation:    Retired     Marital Status:          Tobacco/Alcohol/Supplements:     Last Reviewed on 12/12/2019 03:00 PM by Sakina Donohue    Tobacco: She has a past history of cigarette smoking; quit date:  Quit Date 1990.  No Etoh;         Substance Abuse History:     Last Reviewed on 12/12/2019 03:00 PM by Sakina Donohue        Mental Health History:     Last Reviewed on 12/12/2019 03:00 PM by Sakina Donohue        Communicable Diseases (eg STDs):     Last Reviewed on 12/12/2019 03:00 PM by Sakina Donohue        Current Problems:     Last Reviewed on 6/12/2019 01:44 PM by Sakina Donohue    Allergic rhinitis    Allergic rhinitis, unspecified    Moderate persistent asthma with (acute) exacerbation    Neoplasm of uncertain behavior of colon    Hypothyroidism, unspecified    Type 2 diabetes mellitus with hyperglycemia    Morbid (severe) obesity due to excess calories    Hematuria, unspecified     Morbid obesity    Migraine, unspecified, without mention of intractable migraine without mention of status migrainosus    Hypothyroidism    Type 2 DM    Pulmonary HTN    Adenomatous colon polyps    Diverticulosis    Essential (primary) hypertension    HTN    Asthma    Hip pain    Long term (current) use of opiate analgesic    Other long term (current) drug therapy    Chronic low back pain    Pain in unspecified hip    Low back pain    Patient visit for long term (current) drug use; other    Joint pain, multiple sites    Gout, unspecified    Mixed hyperlipidemia    Gout, unspecified    Pain in  unspecified joint    Other fatigue    Hyperlipidemia    Fatigue    Encounter for other administrative examinations    Other specified administrative purpose    Age-related cognitive decline    Memory loss NOS    Hemangioma unspecified site    Hemangioma, unspecified site        Immunizations:     Td adult 7/1/2008    Prevnar 13 (Pneumococcal PCV 13) 9/28/2016    Fluzone High-Dose pf (>=65 yr) 9/28/2016    Fluzone High-Dose pf (>=65 yr) 11/8/2017    Fluzone High-Dose pf (>=65 yr) 10/15/2018    Fluzone High-Dose pf (>=65 yr) 10/14/2019    PNEUMOVAX 23 (Pneumococcal PPV23) 2/1/2008    Zostavax (Zoster live) 9/1/2012        Allergies:     Last Reviewed on 9/13/2019 03:09 PM by Lisset Gan    Penicillins:      Sulfonamides:      Keflex:      Claritin:      shell fish -muscles:      Monopril:      Serevent (discontinued, use Serevent Diskus):      Avelox:      Crestor: joint pain     Banana:          Current Medications:     Last Reviewed on 9/13/2019 03:09 PM by Lisset Gan    Klor-Con 10 10 mEq oral tablet, extended release [TAKE ONE TABLET BY MOUTH DAILY]    benazepril 40 mg oral tablet [Take 1 tablet(s) by mouth daily]    glimepiride 4 mg oral tablet [TAKE ONE TABLET BY MOUTH TWICE A DAY]    doxazosin 8 mg oral tablet [TAKE 1 TABLET BY MOUTH DAILY]    Mucinex 600 mg oral Tablet,Extended Release 12 hr [1-2 tabs daily PRN]    aspirin 81 mg oral tablet, delayed release (enteric coated) [1 tab daily]    Vitamin D3 1,000 unit oral capsule [2 daily]    Fish Oil 300-1,000 mg oral capsule [1 / day]    Montelukast Sodium 10mg Tablet [1 tab daily]    traZODone 50 mg oral tablet [1 tab HS PRN]    Torsemide 20 mg oral tablet [Take 1 tablet(s) by mouth daily  ]    Omeprazole 20 mg oral tablet, delayed release (enteric coated) [1 capsule daily]    Fluticasone Propionate 50mcg/1actuation Nasal Spray [1 or 2 sprays in each nostril qday ]    metFORMIN 500 mg oral tablet [2 po q am and 1 po q evening]    Tricor 48mg Tablet  [Take 1 tablet(s) by mouth daily]    Zetia 10 mg oral tablet [1 tab daily (Pt Asst)]    HYDROcodone-acetaminophen 7.5-325 mg oral tablet [1 po qd prn]    HumuLIN N NPH U-100 Insulin 100 unit/mL subcutaneous Suspension [INJECT 18 UNITS SUBCUTANEOUSLY EVERY MORNING AND INJECT 10 UNITS SUBCUTANEOUSLY EVERY NIGHT AT BEDTIME]    BD Ultra-Fine 6mm Needle 31G Insulin Syringe 0.3ml Syringe [Use with insulin TID and prn Dx E11.9]    allergy injections every 2-7 days     OTC Xyzal 10mg Take one tablet once daily      ketotifen fumarate 0.025 % (0.035 %) ophthalmic (eye) Drops [1 drop to both eyes bid]    Synthroid 0.05mg Tablet [1 tablet Monday-Friday, then 1.5 tablets Saturday and Sunday]    BD Insulin Syringe 1ml Syringe [0.3mL 31G 6mm syringe use with insulin TID and PRN DxE11.9]    Glucose Reagent Blood Test Strips  Reagent Strips [check blood sugar QID  DX E11.9]    Advair Diskus     albuterol sulfate 2.5 mg /3 mL (0.083 %) Inhalation Solution for Nebulization [1 vial q 4 hours prn]    metoprolol succinate 50 mg oral Tablet, Extended Release 24 hr [TAKE ONE TABLET BY MOUTH DAILY]        Objective:        Vitals:         Current: 12/12/2019 2:28:26 PM    Ht:  5 ft, 1.5 in;  Wt: 221.8 lbs;  BMI: 41.2T: 97.8 F (oral);  BP: 147/49 mm Hg (left arm, sitting);  P: 55 bpm (left arm (BP Cuff), sitting);  sCr: 1.07 mg/dL;  GFR: 49.82        Repeat:     3:33:51 PM  BP:   141/41mm Hg (left arm, sitting, pulse-49)     Exams:     PHYSICAL EXAM:     GENERAL: vital signs recorded - well developed, well nourished;  well groomed;  no apparent distress;     EYES: extraocular movements intact; conjunctiva and cornea are normal; PERRLA;     E/N/T: OROPHARYNX:  normal mucosa, dentition, gingiva, and posterior pharynx;     RESPIRATORY: normal respiratory rate and pattern with no distress; normal breath sounds with no rales, rhonchi, wheezes or rubs;     CARDIOVASCULAR: normal rate; rhythm is regular;  no systolic murmur; no edema;      GASTROINTESTINAL: nontender; normal bowel sounds;     MUSCULOSKELETAL: normal gait; normal overall tone         Assessment:         E11.65   Type 2 diabetes mellitus with hyperglycemia       I10   Essential (primary) hypertension       E78.2   Mixed hyperlipidemia       E03.9   Hypothyroidism, unspecified       M54.5   Low back pain       M10.9   Gout, unspecified           ORDERS:         Meds Prescribed:       [New Rx] allopurinol 100 mg oral tablet [take 1 tablet (100 mg) by oral route oncde daily], #90 (ninety) tablets, Refills: 1 (one)         Radiology/Test Orders:       3014F  Screening mammography results documented and reviewed (PV)1  (In-House)            3017F  Colorectal CA screen results documented and reviewed (PV)  (In-House)            2022F  Dilated retinal eye exam w/interpret by ophthalmologist/optometrist documented/reviewed (DM)4  (In-House)              Lab Orders:       APPTO  Appointment need  (In-House)            [id number]  TSH - [facility] TSH  (Send-Out)                      Plan:         Type 2 diabetes mellitus with hyperglycemiaAdjusting insulin to 20 units QAM and 8 units QHS since she has been having hypoglycemia episodes overnight (down to the 40s).  She is on ASA and an ACEI.  She has not been able to tolerate statins but is on Zetia for HLD.  She is UTD on eye exam, last done 9/2019  She is UTD on foot exam (9/2019).  RTC 3 months.    MIPS Vaccines Flu and Pneumonia updated in Shot record Screening mammomgram done within last 2 years and results in are chart Colorectal Cancer Screening is up to date and the results are in the chart Diabetic Eye Exam during this calendar year and results are in chart     FOLLOW-UP: Schedule a follow-up visit in 3 months.:.  Medicare wellness 30 min with Jayde          Orders:       3014F  Screening mammography results documented and reviewed (PV)1  (In-House)            3017F  Colorectal CA screen results documented and reviewed (PV)  (In-House)             2022F  Dilated retinal eye exam w/interpret by ophthalmologist/optometrist documented/reviewed (DM)4  (In-House)            APPTO  Appointment need  (In-House)              Essential (primary) hypertensionBP at goal at home.  No refills needed.  Labs reviewed and UTD.        Mixed hyperlipidemiaStable.  No refills needed.  Labs reviewed and UTD.        Hypothyroidism, unspecifiedDry skin.  Checking labs.  No refills needed.    LABORATORY:  Labs ordered to be performed today include TSH.            Orders:       [id number]  TSH - [facility] TSH  (Send-Out)              Low back painStable.  No refills needed.        Gout, unspecifiedRefills sent.          Prescriptions:       [New Rx] allopurinol 100 mg oral tablet [take 1 tablet (100 mg) by oral route oncde daily], #90 (ninety) tablets, Refills: 1 (one)             Patient Recommendations:        For  Type 2 diabetes mellitus with hyperglycemia:    Schedule a follow-up visit in 3 months.                APPOINTMENT INFORMATION:        Monday Tuesday Wednesday Thursday Friday Saturday Sunday            Time:___________________AM  PM   Date:_____________________             Charge Capture:         Primary Diagnosis:     E11.65  Type 2 diabetes mellitus with hyperglycemia           Orders:      41111  Office/outpatient visit; established patient, level 4  (In-House)            3014F  Screening mammography results documented and reviewed (PV)1  (In-House)            3017F  Colorectal CA screen results documented and reviewed (PV)  (In-House)            2022F  Dilated retinal eye exam w/interpret by ophthalmologist/optometrist documented/reviewed (DM)4  (In-House)            APPTO  Appointment need  (In-House)              I10  Essential (primary) hypertension     E78.2  Mixed hyperlipidemia     E03.9  Hypothyroidism, unspecified     M54.5  Low back pain     M10.9  Gout, unspecified

## 2021-05-18 NOTE — PROGRESS NOTES
"Mary Chopra. 1940     Office/Outpatient Visit    Visit Date: Wed, Feb 7, 2018 11:33 am    Provider: Sakina Donohue MD (Assistant: Jaci Garcia RN)    Location: [facility]        Electronically signed by Sakina Donohue MD on  02/07/2018 01:08:41 PM                             SUBJECTIVE:        CC:     Mrs. Chopra is a 77 year old White female.  3 month check up; DM, HTN, HLD, hypothyroidism         HPI: Mary is here to f/u on chronic issues.          She is on metformin, glimepiride, and Humulin-N for diabetes.  She follows with Asia Brennan, now Roslyn Tovar.  She is on ASA and an ACEI.  She is unable to tolerate statins but is on Zetia instead.  She is UTD on eye exam, last done 8/2017.  She is UTD on foot exam (4/2017).  Last A1c was 7.5 back in November.        She is on benazepril, metoprolol succinate, and doxazosin for HTN.  BP is well controlled.  No CP, SOB, palpitations.        She is on levothyroxine for hypothyroidism.  Labs UTD.        She is on Singulair, Flonase, and azelastine for allergies.  She has baseline congestion and rhinorrhea.        She has left shoulder pain for which she occasionally uses hydrocodone/APAP.  She has been on oxycodone/APAP in the past but is doing fine on the hydrocodone instead.  She uses typically #30 tabs every 6-7 months.  \"My joints have been killing me.\"  Temperature fluctuations have a tendency to do that to her.  She has been using a lot of Tylenol.  She has been using the hydrocodone/APAP but she tries not to take them unless she really needs them.  She takes a nice hot shower and that helps.          She is on allopurinol for gout.     ROS:     CONSTITUTIONAL:  Negative for fatigue and fever.      EYES:  Negative for blurred vision.      E/N/T:  Positive for nasal congestion ( mild ).   Negative for diminished hearing or frequent rhinorrhea.      CARDIOVASCULAR:  Negative for chest pain and palpitations.      RESPIRATORY:  Negative " for recent cough and dyspnea.      GASTROINTESTINAL:  Negative for abdominal pain, constipation, diarrhea, nausea and vomiting.      MUSCULOSKELETAL:  Positive for arthralgias, back pain, joint stiffness and myalgias.      NEUROLOGICAL:  Negative for paresthesias and weakness.          PMH/FMH/SH:     Last Reviewed on 2/07/2018 11:53 AM by Sakina Donohue    Past Medical History:             PREVENTIVE HEALTH MAINTENANCE             BONE DENSITY: was last done 4/7/16 with normal results     COLORECTAL CANCER SCREENING: Up to date (colonoscopy q10y; sigmoidoscopy q5y; Cologuard q3y) was last done 6/14/16, Results are in chart; colonoscopy with the following abnormalities noted-- tubular adenoma x 2; The next colonoscopy is due  6/2019     DENTAL CLEANING: Refuses due to fear     EYE EXAM: Diabetic Eye Exam during this calendar year and results are in chart was last done 8/22/17     MAMMOGRAM: Done within last 2 years and results in are chart was last done 4/12/17 with normal results Refused in '14, '15 and '16     PAP SMEAR: was last done 12/19/13 with normal results No longer indicated due to age and history         PAST MEDICAL HISTORY         Positive for    Hyperlipidemia and    Hypertension;     Positive for    Pulmonary Hypertension and    RAD;     Postive for    Gastritis and    Esophageal ulcers;     Positive for    Hematuria with malrotation Rt kidney;     Positive for    DDD L-spine, Rt gluteal tendonitis and    Lumbar Radiculitis and L4-L5 Spondylosis '15;     Positive for    Type 2 Diabetes and    Hypothyroidism;     Positive for    Prominent Rt hepatic lobe '15;             ADVANCE DIRECTIVES: Living will         Surgical History:         Positive for    Hysterectomy: Abdominal; Partial; ;     Positive for    Trochanter bursa injection '10;,    L-sine disc and cyst removal '10 (L4-5 and L5-S1);,    L-spine epidural; and    Cystoscopy '05;;         Family History: NO colon, NO Breast, NO Ovarian, NO  Prostate Cancer;  CAD;  CVA;         Social History:     Occupation:    Retired     Marital Status:          Tobacco/Alcohol/Supplements:     Last Reviewed on 2/07/2018 11:53 AM by Sakina Donohue    Tobacco: She has a past history of cigarette smoking; quit date:  Quit Date 1990.  No Etoh;         Substance Abuse History:     Last Reviewed on 2/07/2018 11:53 AM by Sakina Donohue        Mental Health History:     Last Reviewed on 2/07/2018 11:53 AM by Sakina Donohue        Communicable Diseases (eg STDs):     Last Reviewed on 2/07/2018 11:53 AM by Sakina Donohue            Current Problems:     Last Reviewed on 11/08/2017 11:16 AM by Sakina Donohue    Memory loss NOS     Hyperlipidemia     Fatigue     Gout, unspecified     Joint pain, multiple sites     Patient visit for long term (current) drug use; other     Chronic low back pain     Hip pain     Asthma     Migraine, unspecified, without mention of intractable migraine without mention of status migrainosus     Type 2 DM     Adenomatous colon polyps     Hypothyroidism     Morbid obesity     Pulmonary HTN     Diverticulosis     Hematuria, unspecified     HTN     Allergic rhinitis     Other specified administrative purpose         Immunizations:     Td adult 7/1/2008     Prevnar-13 9/28/2016     Fluzone High-Dose pf (>=65 yr) 9/28/2016     Fluzone High-Dose pf (>=65 yr) 11/8/2017     PNEUMOVAX 23 (Pneumococcal PPV23) 2/1/2008     Zostavax (Zoster) 9/1/2012         Allergies:     Last Reviewed on 2/07/2018 11:38 AM by Jaci Garcia    Penicillins:    Sulfonamides:    Keflex:    Claritin:    Monopril:    Serevent (discontinued, use Serevent Diskus):    Avelox:    Crestor: joint pain        Current Medications:     Last Reviewed on 2/07/2018 11:40 AM by Jaci Garcia    Levothyroxine Sodium 50mcg Capsules 1 tab M-F 1 1/2 tab Sat and Sun     Tricor 48mg Tablet Take 1 tablet(s) by mouth daily     Benazepril HCl 40mg Tablet Take 1 tablet(s) by mouth daily      Torsemide 20mg Tablet Take 1 tablet(s) by mouth daily     Klor-Con 10 10mEq Tablets, Extended Release Take 1 tablet(s) by mouth daily     Hydrocodone/Acetaminophen 7.5mg/325mg Tablet 1 po qd prn     Doxazosin Mesylate 8mg Tablet Take 1 tablet(s) by mouth daily     Metoprolol Succinate 50mg Tablets, Extended Release Take 1 tablet(s) by mouth daily     Allopurinol 300mg Tablet 1 tab daily     Magnesium Oxide 400mg Tablet Take 1 tablet(s) by mouth daily     Zetia 10mg Tablet 1 tab daily     Fish Oil 1,000mg Softgel capsule 1 / day     Montelukast Sodium 10mg Tablet 1 tab daily     Omeprazole 20mg Tablets, Delayed Release 1 capsule daily     Humulin N 100units/1ml Injection 15 am and 13 evening     Vitamin D3 1,000IU Capsules 2 daily     Metformin HCl 500mg Tablet 1 tab bid     Aspirin (ASA) 81mg Tablets, Enteric Coated 1 tab daily     Glimepiride 4mg Tablet Take 1 tablet(s) by mouth bid     Mucinex 600mg Tablets, Extended Release 1-2 tabs daily PRN     Fluticasone Propionate 50mcg/1actuation Nasal Spray 1 or 2 sprays in each nostril qday         OBJECTIVE:        Vitals:         Current: 2/7/2018 11:37:24 AM    Ht:  5 ft, 1.5 in;  Wt: 225 lbs;  BMI: 41.8    T: 98.5 F (oral);  BP: 135/64 mm Hg (left arm, sitting);  P: 78 bpm (left arm (BP Cuff), sitting);  sCr: 1.03 mg/dL;  GFR: 53.68        Exams:     PHYSICAL EXAM:     GENERAL: vital signs recorded - well developed, well nourished;  well groomed;  no apparent distress;     EYES: extraocular movements intact; conjunctiva and cornea are normal; PERRLA;     E/N/T: OROPHARYNX:  normal mucosa, dentition, gingiva, and posterior pharynx;     RESPIRATORY: normal respiratory rate and pattern with no distress; normal breath sounds with no rales, rhonchi, wheezes or rubs;     CARDIOVASCULAR: normal rate; rhythm is regular;  no systolic murmur; no edema;     GASTROINTESTINAL: nontender; normal bowel sounds;     BREAST/INTEGUMENT: hemangioma 5 mm diameter R distal thumb;      MUSCULOSKELETAL: normal gait; normal overall tone         Lab/Test Results:             Hemoglobin A1c:  7.8 (02/07/2018),     Performed by::  mlb (02/07/2018),     Urine temperature:  confirmed (02/07/2018),     All urine drug screen levels confirmed negative:  yes (02/07/2018),     Date and time of last pill:  Hydrocodone 02-03-18 @ 6PM (02/07/2018),     Performed by:  mlb (02/07/2018),     Collection Time:  1242 (02/07/2018),             ASSESSMENT           719.49   M25.50  Joint pain, multiple sites              DDx:     V58.69   Z79.891   Z79.899  Patient visit for long term (current) drug use; other              DDx:     250.00   E11.9  Type 2 DM              DDx:     401.1   I10  HTN              DDx:     244.9   E03.9  Hypothyroidism              DDx:     272.4   E78.2  Hyperlipidemia              DDx:     228.00   D18.00  Hemangioma, unspecified site              DDx:         ORDERS:         Meds Prescribed:       Refill of: Humulin N (Insulin, Isophane Suspension (NPH, Human)) 100units/1ml Injection 15 am and 13 evening QS for 30 day(s) Refills: 5       Refill of: Hydrocodone/Acetaminophen 7.5mg/325mg Tablet 1 po qd prn  #30 (Thirty) tablet(s) Refills: 0         Lab Orders:       17069  Drug test prsmv qual dir optical obs per day  (In-House)         54179*  Hgb A1c fast lab  (In-House)           Procedures Ordered:       REFER  Referral to Specialist or Other Facility  (Send-Out)         60852  Collection of capillary blood specimen (eg, finger, heel, ear stick)  (In-House)                   PLAN:          Joint pain, multiple sites She uses the Norco quite rarely, not on a daily basis.  She does require ongoing use of this controlled substance to function.  Refills sent today.  Tox screen and Bran run.  RTC 4 months.         FOLLOW-UP: Schedule a follow-up visit in 4 months..            Prescriptions:       Refill of: Hydrocodone/Acetaminophen 7.5mg/325mg Tablet 1 po qd prn  #30 (Thirty) tablet(s)  Refills: 0           Patient Education Handouts:       [facility] Medication Compliance           Patient visit for long term (current) drug use; other         FOLLOW-UP: Schedule a follow-up visit in 4 months..  Controlled substance documentation: Bran reviewed; drug screen performed and appropriate; consent is reviewed and signed and on the chart.  She is aware of risk of addiction on this medication, understands that she will need to follow up for a review every 3 months and her medications will be adjusted or decreased as deemed appropriate at each visit.  No history of drug or alcohol abuse.  No concerns about diversion or abuse. She denies side effects related to the medication.  She is aware that she may be called in for pill counts.  The dosing of this medication will be reviewed on a regular basis and reduced if possible..  Ongoing use of a controlled substance is necessary for this patient to have a normal quality of life Drug screen           Orders:       96886  Drug test prsmv qual dir optical obs per day  (In-House)            Type 2 DM She is on metformin, glimepiride, and Humulin-N for diabetes.  Refills sent as below.  She was following with Asia Brennan previously; I am getting to be taking that over.  She is on ASA and an ACEI.  She is unable to tolerate statins but is on Zetia instead.  She is UTD on eye exam, last done 8/2017.  She is UTD on foot exam (4/2017).  Last A1c was 7.5 back in November; rechecking today.     LABORATORY:  Labs ordered to be performed today include Hgb A1c inhouse fast lab.            Prescriptions:       Refill of: Humulin N (Insulin, Isophane Suspension (NPH, Human)) 100units/1ml Injection 15 am and 13 evening QS for 30 day(s) Refills: 5           Orders:       46767*  Hgb A1c fast lab  (In-House)         75360  Collection of capillary blood specimen (eg, finger, heel, ear stick)  (In-House)            HTN BP at goal.  Labs UTD.  No refills needed.          Hypothyroidism  Stable.  Labs UTD.  No refills needed.          Hyperlipidemia Stable.  Labs UTD.  No refills needed.          Hemangioma, unspecified site Referral placed for small hemangioma on R thumb to Dr. Baumann.  It bleeds quite a bit when she hits it on anything.         REFERRALS:  Referral initiated to a dermatologist ( Dr. Linda Baumann; for evaluation of hemangioma ).            Orders:       REFER  Referral to Specialist or Other Facility  (Send-Out)               Patient Recommendations:        For  Joint pain, multiple sites:     Schedule a follow-up visit in 4 months.                APPOINTMENT INFORMATION:        Monday Tuesday Wednesday Thursday Friday Saturday Sunday            Time:___________________AM  PM   Date:_____________________         For  Patient visit for long term (current) drug use; other:     Schedule a follow-up visit in 4 months.                APPOINTMENT INFORMATION:        Monday Tuesday Wednesday Thursday Friday Saturday Sunday            Time:___________________AM  PM   Date:_____________________             CHARGE CAPTURE           **Please note: ICD descriptions below are intended for billing purposes only and may not represent clinical diagnoses**        Primary Diagnosis:         719.49 Joint pain, multiple sites            M25.50    Pain in unspecified joint              Orders:          03396   Office/outpatient visit; established patient, level 4  (In-House)           V58.69 Patient visit for long term (current) drug use; other            Z79.891    Long term (current) use of opiate analgesic           Z79.899    Other long term (current) drug therapy              Orders:          01835   Drug test prsmv qual dir optical obs per day  (In-House)           250.00 Type 2 DM            E11.9    Type 2 diabetes mellitus without complications              Orders:          08570*   Hgb A1c fast lab  (In-House)             68814   Collection of capillary blood specimen (eg, finger,  heel, ear stick)  (In-House)           401.1 HTN            I10    Essential (primary) hypertension    244.9 Hypothyroidism            E03.9    Hypothyroidism, unspecified    272.4 Hyperlipidemia            E78.2    Mixed hyperlipidemia    228.00 Hemangioma, unspecified site            D18.00    Hemangioma unspecified site        ADDENDUMS:      ____________________________________    Date: 02/08/2018 10:39 AM    Author: Ruthy Beatty         Visit Note Faxed to:        Malena Baumann ; Number [phone]     Health Summary Faxed to:        Malena Baumann ; Number [phone]

## 2021-05-22 ENCOUNTER — TRANSCRIBE ORDERS (OUTPATIENT)
Dept: ADMINISTRATIVE | Facility: HOSPITAL | Age: 81
End: 2021-05-22

## 2021-05-22 DIAGNOSIS — Z78.0 POSTMENOPAUSAL: Primary | ICD-10-CM

## 2021-06-08 RX ORDER — AMLODIPINE BESYLATE 5 MG/1
TABLET ORAL
Qty: 90 TABLET | Refills: 0 | Status: SHIPPED | OUTPATIENT
Start: 2021-06-08 | End: 2021-06-09

## 2021-06-09 RX ORDER — FUROSEMIDE 20 MG/1
20 TABLET ORAL
COMMUNITY
End: 2021-08-09

## 2021-06-09 RX ORDER — ALOGLIPTIN AND METFORMIN HYDROCHLORIDE 12.5; 5 MG/1; MG/1
TABLET, FILM COATED ORAL
COMMUNITY
End: 2021-06-23

## 2021-06-09 RX ORDER — ROSUVASTATIN CALCIUM 20 MG/1
20 TABLET, COATED ORAL DAILY
COMMUNITY
End: 2021-08-11

## 2021-06-09 RX ORDER — ALLOPURINOL 100 MG/1
300 TABLET ORAL DAILY
COMMUNITY
End: 2021-07-21

## 2021-06-09 RX ORDER — ATENOLOL 25 MG/1
25 TABLET ORAL DAILY
COMMUNITY
End: 2021-06-23

## 2021-06-09 RX ORDER — TRAZODONE HYDROCHLORIDE 100 MG/1
100 TABLET ORAL NIGHTLY PRN
COMMUNITY
End: 2022-03-03

## 2021-06-09 RX ORDER — AMLODIPINE BESYLATE 5 MG/1
TABLET ORAL
Qty: 30 TABLET | Refills: 2 | Status: SHIPPED | OUTPATIENT
Start: 2021-06-09 | End: 2021-06-23

## 2021-06-09 RX ORDER — CITALOPRAM 10 MG/1
TABLET ORAL
COMMUNITY
End: 2021-06-23

## 2021-06-09 RX ORDER — CITALOPRAM 20 MG/1
20 TABLET ORAL DAILY
COMMUNITY
End: 2021-06-23

## 2021-06-10 ENCOUNTER — CLINICAL SUPPORT (OUTPATIENT)
Dept: FAMILY MEDICINE CLINIC | Age: 81
End: 2021-06-10

## 2021-06-10 DIAGNOSIS — J30.9 ALLERGIC RHINITIS, UNSPECIFIED SEASONALITY, UNSPECIFIED TRIGGER: Primary | ICD-10-CM

## 2021-06-10 PROCEDURE — 95117 IMMUNOTHERAPY INJECTIONS: CPT | Performed by: FAMILY MEDICINE

## 2021-06-17 ENCOUNTER — CLINICAL SUPPORT (OUTPATIENT)
Dept: FAMILY MEDICINE CLINIC | Age: 81
End: 2021-06-17

## 2021-06-17 DIAGNOSIS — J30.9 ALLERGIC RHINITIS, UNSPECIFIED SEASONALITY, UNSPECIFIED TRIGGER: Primary | ICD-10-CM

## 2021-06-17 PROCEDURE — 95117 IMMUNOTHERAPY INJECTIONS: CPT | Performed by: FAMILY MEDICINE

## 2021-06-23 ENCOUNTER — LAB (OUTPATIENT)
Dept: LAB | Facility: HOSPITAL | Age: 81
End: 2021-06-23

## 2021-06-23 ENCOUNTER — HOSPITAL ENCOUNTER (OUTPATIENT)
Dept: BONE DENSITY | Facility: HOSPITAL | Age: 81
Discharge: HOME | End: 2021-06-23

## 2021-06-23 ENCOUNTER — OFFICE VISIT (OUTPATIENT)
Dept: FAMILY MEDICINE CLINIC | Age: 81
End: 2021-06-23

## 2021-06-23 VITALS
TEMPERATURE: 97.7 F | HEART RATE: 62 BPM | HEIGHT: 62 IN | WEIGHT: 201.2 LBS | SYSTOLIC BLOOD PRESSURE: 145 MMHG | DIASTOLIC BLOOD PRESSURE: 49 MMHG | BODY MASS INDEX: 37.02 KG/M2

## 2021-06-23 DIAGNOSIS — J30.9 ALLERGIC RHINITIS, UNSPECIFIED SEASONALITY, UNSPECIFIED TRIGGER: ICD-10-CM

## 2021-06-23 DIAGNOSIS — E11.65 TYPE 2 DIABETES MELLITUS WITH HYPERGLYCEMIA, WITHOUT LONG-TERM CURRENT USE OF INSULIN (HCC): ICD-10-CM

## 2021-06-23 DIAGNOSIS — Z78.0 POSTMENOPAUSAL: ICD-10-CM

## 2021-06-23 DIAGNOSIS — E66.01 MORBID (SEVERE) OBESITY DUE TO EXCESS CALORIES (HCC): ICD-10-CM

## 2021-06-23 DIAGNOSIS — Z00.00 ANNUAL PHYSICAL EXAM: Primary | ICD-10-CM

## 2021-06-23 DIAGNOSIS — I48.0 PAROXYSMAL ATRIAL FIBRILLATION (HCC): ICD-10-CM

## 2021-06-23 DIAGNOSIS — I10 ESSENTIAL HYPERTENSION: ICD-10-CM

## 2021-06-23 PROBLEM — E78.2 MIXED HYPERLIPIDEMIA: Status: ACTIVE | Noted: 2021-06-23

## 2021-06-23 PROBLEM — E78.00 HIGH CHOLESTEROL: Status: ACTIVE | Noted: 2021-06-23

## 2021-06-23 PROBLEM — E11.9 DIABETES: Status: ACTIVE | Noted: 2021-06-23

## 2021-06-23 LAB
ANION GAP SERPL CALCULATED.3IONS-SCNC: 12.4 MMOL/L (ref 5–15)
BUN SERPL-MCNC: 14 MG/DL (ref 8–23)
BUN/CREAT SERPL: 18.2 (ref 7–25)
CALCIUM SPEC-SCNC: 9.8 MG/DL (ref 8.6–10.5)
CHLORIDE SERPL-SCNC: 102 MMOL/L (ref 98–107)
CO2 SERPL-SCNC: 25.6 MMOL/L (ref 22–29)
CREAT SERPL-MCNC: 0.77 MG/DL (ref 0.57–1)
GFR SERPL CREATININE-BSD FRML MDRD: 72 ML/MIN/1.73
GLUCOSE SERPL-MCNC: 145 MG/DL (ref 65–99)
HBA1C MFR BLD: 8.1 % (ref 4.8–5.6)
HCV AB SER DONR QL: NORMAL
POTASSIUM SERPL-SCNC: 4.7 MMOL/L (ref 3.5–5.2)
SODIUM SERPL-SCNC: 140 MMOL/L (ref 136–145)

## 2021-06-23 PROCEDURE — 95117 IMMUNOTHERAPY INJECTIONS: CPT | Performed by: FAMILY MEDICINE

## 2021-06-23 PROCEDURE — G0439 PPPS, SUBSEQ VISIT: HCPCS | Performed by: FAMILY MEDICINE

## 2021-06-23 PROCEDURE — 77080 DXA BONE DENSITY AXIAL: CPT

## 2021-06-23 PROCEDURE — 83036 HEMOGLOBIN GLYCOSYLATED A1C: CPT

## 2021-06-23 PROCEDURE — 86803 HEPATITIS C AB TEST: CPT

## 2021-06-23 PROCEDURE — 80048 BASIC METABOLIC PNL TOTAL CA: CPT

## 2021-06-23 PROCEDURE — 36415 COLL VENOUS BLD VENIPUNCTURE: CPT

## 2021-06-23 RX ORDER — METOPROLOL SUCCINATE 100 MG/1
1 TABLET, EXTENDED RELEASE ORAL 2 TIMES DAILY
COMMUNITY
Start: 2021-05-30 | End: 2021-08-23

## 2021-06-23 RX ORDER — APIXABAN 5 MG/1
1 TABLET, FILM COATED ORAL 2 TIMES DAILY
COMMUNITY
Start: 2021-05-31 | End: 2021-08-09

## 2021-06-23 RX ORDER — AMLODIPINE BESYLATE 5 MG/1
5 TABLET ORAL DAILY
Qty: 90 TABLET | Refills: 1 | Status: SHIPPED | OUTPATIENT
Start: 2021-06-23 | End: 2022-03-03 | Stop reason: SDUPTHER

## 2021-06-23 NOTE — PROGRESS NOTES
The ABCs of the Annual Wellness Visit  Subsequent Medicare Wellness Visit    Chief Complaint   Patient presents with   • Medicare Wellness-subsequent       Subjective   History of Present Illness:  Mary Chopra is a 80 y.o. female who presents for a Subsequent Medicare Wellness Visit.    She is UTD on colonoscopy, last done 2016 and this showed tubular adenoma x2.  Pap smear is no longer indicated by age and history. She is UTD on mammogram, last done 2021 and this was normal.  She is due for DEXA, last done 2019 and this was normal; this is already scheduled for today.  She is UTD on Pneumovax (2008), Prevnar (2016), Zostavax (2012), and flu (10/2019).  She is due for Shingrix and Td ().  She is due for routine labs including A1c, BMP and hep C ab.  She is UTD on eye exam (2020) and foot exam.  .    She is having fasting -200 every morning.  Is not currently on insulin but would like to restart.    HEALTH RISK ASSESSMENT    Recent Hospitalizations:  Recently treated at the following:  Other: [facility]    Current Medical Providers:  Patient Care Team:  Sakina Donohue MD as PCP - General (Family Medicine)    Smoking Status:  Social History     Tobacco Use   Smoking Status Former Smoker   • Types: Cigarettes   • Quit date:    • Years since quittin.4   Smokeless Tobacco Never Used       Alcohol Consumption:  Social History     Substance and Sexual Activity   Alcohol Use No       Depression Screen:   PHQ-2/PHQ-9 Depression Screening 2021   Little interest or pleasure in doing things 0   Feeling down, depressed, or hopeless 0   Total Score 0       Fall Risk Screen:  STEADI Fall Risk Assessment was completed, and patient is at LOW risk for falls.Assessment completed on:2021    Health Habits and Functional and Cognitive Screening:  Functional & Cognitive Status 2021   Do you have difficulty preparing food and eating? No   Do you have difficulty bathing  yourself, getting dressed or grooming yourself? No   Do you have difficulty using the toilet? No   Do you have difficulty moving around from place to place? No   Do you have trouble with steps or getting out of a bed or a chair? Yes   Current Diet Well Balanced Diet   Dental Exam Not up to date   Eye Exam Up to date   Exercise (times per week) 7 times per week   Current Exercises Include Walking   Do you need help using the phone?  No   Are you deaf or do you have serious difficulty hearing?  Yes   Do you need help with transportation? Yes   Do you need help shopping? No   Do you need help preparing meals?  No   Do you need help with housework?  No   Do you need help with laundry? No   Do you need help taking your medications? No   Do you need help managing money? No   Do you ever drive or ride in a car without wearing a seat belt? No   Have you felt unusual stress, anger or loneliness in the last month? No   Who do you live with? Alone   If you need help, do you have trouble finding someone available to you? No   Have you been bothered in the last four weeks by sexual problems? No   Do you have difficulty concentrating, remembering or making decisions? Yes         Does the patient have evidence of cognitive impairment? No    Asprin use counseling:Taking ASA appropriately as indicated    Age-appropriate Screening Schedule:  Refer to the list below for future screening recommendations based on patient's age, sex and/or medical conditions. Orders for these recommended tests are listed in the plan section. The patient has been provided with a written plan.    Health Maintenance   Topic Date Due   • ZOSTER VACCINE (2 of 3) 10/27/2012   • TDAP/TD VACCINES (2 - Tdap) 07/01/2018   • DXA SCAN  04/24/2021   • INFLUENZA VACCINE  08/01/2021   • HEMOGLOBIN A1C  09/23/2021   • DIABETIC EYE EXAM  11/06/2021   • LIPID PANEL  03/23/2022   • URINE MICROALBUMIN  03/23/2022          The following portions of the patient's history were  reviewed and updated as appropriate: allergies, current medications, past family history, past medical history, past social history, past surgical history and problem list.    Outpatient Medications Prior to Visit   Medication Sig Dispense Refill   • allopurinol (ZYLOPRIM) 100 MG tablet Take 300 mg by mouth Daily.     • aspirin 81 MG EC tablet Take 81 mg by mouth Daily.     • benazepril (LOTENSIN) 40 MG tablet Take 40 mg by mouth Daily.     • Dextromethorphan-guaiFENesin (MUCINEX DM PO) Take 1 tablet by mouth Daily.     • Eliquis 5 MG tablet tablet Take 1 tablet by mouth 2 (Two) Times a Day.     • ezetimibe (ZETIA) 10 MG tablet Take 10 mg by mouth Daily.     • fenofibrate (TRICOR) 48 MG tablet Take 48 mg by mouth Daily.     • furosemide (LASIX) 20 MG tablet Take 20 mg by mouth.     • glimepiride (AMARYL) 4 MG tablet Take 4 mg by mouth 2 (Two) Times a Day.     • levothyroxine (SYNTHROID, LEVOTHROID) 50 MCG tablet Take 75 mcg by mouth Daily.     • metFORMIN (GLUCOPHAGE) 500 MG tablet Take 500 mg by mouth 3 (Three) Times a Day.     • metoprolol succinate XL (TOPROL-XL) 100 MG 24 hr tablet Take 1 tablet by mouth 2 (Two) Times a Day.     • omeprazole (priLOSEC) 20 MG capsule Take 20 mg by mouth Daily As Needed.     • amLODIPine (NORVASC) 5 MG tablet TAKE ONE TABLET BY MOUTH DAILY 30 tablet 2   • fluticasone (VERAMYST) 27.5 MCG/SPRAY nasal spray 2 sprays into the nostril(s) as directed by provider Daily.     • insulin NPH (humuLIN N,novoLIN N) 100 UNIT/ML injection Inject 8 Units under the skin into the appropriate area as directed 2 (two) times a day.     • montelukast (SINGULAIR) 10 MG tablet Take 10 mg by mouth Daily.     • potassium chloride (K-DUR) 10 MEQ CR tablet Take 10 mEq by mouth Daily.     • rosuvastatin (Crestor) 20 MG tablet Take 20 mg by mouth Daily.     • traZODone (DESYREL) 100 MG tablet Take 100 mg by mouth At Night As Needed.     • Alogliptin-metFORMIN HCl (Kazano) 12.5-500 MG tablet Take  by mouth.      • atenolol (TENORMIN) 25 MG tablet Take 25 mg by mouth Daily.     • Cholecalciferol (D3-1000) 1000 units capsule Take 1,000 Units by mouth 2 (Two) Times a Day.     • citalopram (CeleXA) 10 MG tablet      • citalopram (CeleXA) 20 MG tablet Take 20 mg by mouth Daily.     • doxazosin (CARDURA) 8 MG tablet Take 8 mg by mouth Daily.     • insulin NPH (humuLIN N,novoLIN N) 100 UNIT/ML injection Inject 12 Units under the skin into the appropriate area as directed Every Night.     • Magnesium 400 MG capsule Take 1 tablet by mouth.     • metoprolol tartrate (LOPRESSOR) 50 MG tablet Take 50 mg by mouth Daily.     • Multiple Vitamin (MULTI-VITAMIN DAILY PO) Take 1 tablet by mouth.     • Omega-3 Fatty Acids (FISH OIL) 1000 MG capsule capsule Take 1,000 mg by mouth Daily With Breakfast.     • SITagliptin (Januvia) 100 MG tablet Take 100 mg by mouth Daily.     • torsemide (DEMADEX) 20 MG tablet Take 20 mg by mouth Daily.       No facility-administered medications prior to visit.       Patient Active Problem List   Diagnosis   • Anxiety   • Generalized osteoarthritis   • Asthma   • Gastroesophageal reflux disease without esophagitis   • Essential hypertension   • Mixed hyperlipidemia   • Acquired hypothyroidism   • Type 2 diabetes mellitus with hyperglycemia, without long-term current use of insulin (CMS/HCC)   • Paroxysmal atrial fibrillation (CMS/HCC)   • Morbid (severe) obesity due to excess calories (CMS/HCC)   • Annual physical exam       Advanced Care Planning:  ACP discussion was declined by the patient. Patient does not have an advance directive, information provided.    Review of Systems   Constitutional: Negative for chills, fatigue and fever.   HENT: Negative for congestion, hearing loss and rhinorrhea.    Eyes: Negative for pain and visual disturbance.   Respiratory: Negative for cough and shortness of breath.    Cardiovascular: Negative for chest pain and palpitations.   Gastrointestinal: Negative for abdominal  "pain, constipation, diarrhea, nausea and vomiting.   Genitourinary: Negative for dysuria and hematuria.   Musculoskeletal: Positive for arthralgias and back pain. Negative for myalgias.   Skin: Negative for rash.   Neurological: Negative for weakness and numbness.   Psychiatric/Behavioral: Negative for dysphoric mood and sleep disturbance. The patient is not nervous/anxious.        Compared to one year ago, the patient feels her physical health is worse.  Compared to one year ago, the patient feels her mental health is the same.    Reviewed chart for potential of high risk medication in the elderly: yes  Reviewed chart for potential of harmful drug interactions in the elderly:yes    Objective         Vitals:    06/23/21 1459   BP: 155/54   BP Location: Left arm   Patient Position: Sitting   Pulse: 65   Temp: 97.7 °F (36.5 °C)   TempSrc: Oral   Weight: 91.3 kg (201 lb 3.2 oz)   Height: 156.2 cm (61.5\")       Body mass index is 37.4 kg/m².  Discussed the patient's BMI with her. The BMI is above average; BMI management plan is completed.    Physical Exam  Vitals reviewed.   Constitutional:       General: She is not in acute distress.     Appearance: Normal appearance. She is well-developed.   HENT:      Head: Normocephalic and atraumatic.      Right Ear: External ear normal.      Left Ear: External ear normal.      Mouth/Throat:      Mouth: Mucous membranes are moist.   Eyes:      Extraocular Movements: Extraocular movements intact.      Conjunctiva/sclera: Conjunctivae normal.      Pupils: Pupils are equal, round, and reactive to light.   Cardiovascular:      Rate and Rhythm: Normal rate and regular rhythm.      Heart sounds: No murmur heard.     Pulmonary:      Effort: Pulmonary effort is normal.      Breath sounds: Normal breath sounds. No wheezing, rhonchi or rales.   Abdominal:      General: Bowel sounds are normal. There is no distension.      Palpations: Abdomen is soft.      Tenderness: There is no abdominal " tenderness.   Musculoskeletal:         General: Normal range of motion.   Skin:     General: Skin is warm and dry.   Neurological:      Mental Status: She is alert and oriented to person, place, and time.      Deep Tendon Reflexes: Reflexes normal.   Psychiatric:         Mood and Affect: Mood and affect normal.         Behavior: Behavior normal.         Thought Content: Thought content normal.         Judgment: Judgment normal.               Assessment/Plan   Medicare Risks and Personalized Health Plan  CMS Preventative Services Quick Reference  Breast Cancer/Mammogram Screening  Cardiovascular risk  Colon Cancer Screening  Osteoporosis Risk    The above risks/problems have been discussed with the patient.  Pertinent information has been shared with the patient in the After Visit Summary.  Follow up plans and orders are seen below in the Assessment/Plan Section.    Diagnoses and all orders for this visit:    1. Annual physical exam (Primary)  Assessment & Plan:  She is UTD on colonoscopy, last done 6/2016 and this showed tubular adenoma x2.  Pap smear is no longer indicated by age and history. She is UTD on mammogram, last done 5/2021 and this was normal.  She is due for DEXA, last done 4/2019 and this was normal; this is already scheduled for today.  She is UTD on Pneumovax (2/2008), Prevnar (9/2016), Zostavax (9/2012), and flu (10/2019).  She is due for Shingrix and Td (2008); can be done at the pharmacy.  She is due for routine labs including A1c, BMP and hep C ab; ordered.  She is UTD on eye exam (11/2020) and foot exam.        2. Type 2 diabetes mellitus with hyperglycemia, without long-term current use of insulin (CMS/[facility])  Assessment & Plan:  Restarting NPH insulin at 8 units BID.  Refills sent.    Orders:  -     Basic Metabolic Panel; Future  -     Hemoglobin A1c; Future  -     Hepatitis C Antibody; Future    3. Essential hypertension    4. Paroxysmal atrial fibrillation (CMS/[facility])  Assessment & Plan:  OK to  hold Eliquis for 2 days prior to colonoscopy, which is coming up.  I have already informed Dr. Jalyn Hernandez.      5. Morbid (severe) obesity due to excess calories (CMS/HCC)  Assessment & Plan:  We are going to restart her insulin, and she thinks that this really helped her to lose weight appropriately in the past.      Follow Up:  Return in about 4 months (around 10/23/2021) for Recheck DM.     An After Visit Summary and PPPS were given to the patient.

## 2021-06-23 NOTE — ASSESSMENT & PLAN NOTE
OK to hold Eliquis for 2 days prior to colonoscopy, which is coming up.  I have already informed Dr. Jalyn Hernandez.

## 2021-06-23 NOTE — ASSESSMENT & PLAN NOTE
We are going to restart her insulin, and she thinks that this really helped her to lose weight appropriately in the past.

## 2021-06-23 NOTE — ASSESSMENT & PLAN NOTE
She is UTD on colonoscopy, last done 6/2016 and this showed tubular adenoma x2.  Pap smear is no longer indicated by age and history. She is UTD on mammogram, last done 5/2021 and this was normal.  She is due for DEXA, last done 4/2019 and this was normal; this is already scheduled for today.  She is UTD on Pneumovax (2/2008), Prevnar (9/2016), Zostavax (9/2012), and flu (10/2019).  She is due for Shingrix and Td (2008); can be done at the pharmacy.  She is due for routine labs including A1c, BMP and hep C ab; ordered.  She is UTD on eye exam (11/2020) and foot exam.

## 2021-06-30 ENCOUNTER — CLINICAL SUPPORT (OUTPATIENT)
Dept: FAMILY MEDICINE CLINIC | Age: 81
End: 2021-06-30

## 2021-06-30 DIAGNOSIS — J30.9 ALLERGIC RHINITIS, UNSPECIFIED SEASONALITY, UNSPECIFIED TRIGGER: Primary | ICD-10-CM

## 2021-06-30 PROCEDURE — 95117 IMMUNOTHERAPY INJECTIONS: CPT | Performed by: FAMILY MEDICINE

## 2021-07-01 VITALS
DIASTOLIC BLOOD PRESSURE: 41 MMHG | TEMPERATURE: 97.8 F | HEART RATE: 55 BPM | BODY MASS INDEX: 40.82 KG/M2 | WEIGHT: 221.8 LBS | HEIGHT: 62 IN | SYSTOLIC BLOOD PRESSURE: 141 MMHG

## 2021-07-01 VITALS
DIASTOLIC BLOOD PRESSURE: 51 MMHG | HEART RATE: 60 BPM | BODY MASS INDEX: 41.37 KG/M2 | SYSTOLIC BLOOD PRESSURE: 157 MMHG | TEMPERATURE: 98.1 F | HEIGHT: 62 IN | WEIGHT: 224.8 LBS

## 2021-07-01 VITALS
HEIGHT: 62 IN | TEMPERATURE: 97.6 F | HEART RATE: 93 BPM | DIASTOLIC BLOOD PRESSURE: 81 MMHG | SYSTOLIC BLOOD PRESSURE: 136 MMHG | BODY MASS INDEX: 39.64 KG/M2 | WEIGHT: 215.4 LBS

## 2021-07-01 VITALS
BODY MASS INDEX: 41.77 KG/M2 | HEART RATE: 68 BPM | TEMPERATURE: 98 F | SYSTOLIC BLOOD PRESSURE: 148 MMHG | HEIGHT: 62 IN | DIASTOLIC BLOOD PRESSURE: 54 MMHG | WEIGHT: 227 LBS

## 2021-07-01 VITALS
DIASTOLIC BLOOD PRESSURE: 41 MMHG | HEART RATE: 61 BPM | HEIGHT: 62 IN | BODY MASS INDEX: 40.93 KG/M2 | SYSTOLIC BLOOD PRESSURE: 145 MMHG | TEMPERATURE: 97.9 F | WEIGHT: 222.4 LBS

## 2021-07-01 VITALS
HEART RATE: 62 BPM | HEIGHT: 62 IN | OXYGEN SATURATION: 96 % | WEIGHT: 225.6 LBS | TEMPERATURE: 98 F | SYSTOLIC BLOOD PRESSURE: 138 MMHG | BODY MASS INDEX: 41.51 KG/M2 | DIASTOLIC BLOOD PRESSURE: 68 MMHG

## 2021-07-01 VITALS
HEART RATE: 74 BPM | SYSTOLIC BLOOD PRESSURE: 155 MMHG | WEIGHT: 226.2 LBS | DIASTOLIC BLOOD PRESSURE: 59 MMHG | TEMPERATURE: 97.5 F | BODY MASS INDEX: 41.62 KG/M2 | HEIGHT: 62 IN

## 2021-07-01 VITALS
TEMPERATURE: 97.9 F | HEART RATE: 65 BPM | SYSTOLIC BLOOD PRESSURE: 132 MMHG | BODY MASS INDEX: 40.56 KG/M2 | WEIGHT: 220.4 LBS | DIASTOLIC BLOOD PRESSURE: 64 MMHG | HEIGHT: 62 IN

## 2021-07-01 VITALS
WEIGHT: 225 LBS | TEMPERATURE: 98.5 F | HEIGHT: 62 IN | HEART RATE: 78 BPM | SYSTOLIC BLOOD PRESSURE: 135 MMHG | BODY MASS INDEX: 41.41 KG/M2 | DIASTOLIC BLOOD PRESSURE: 64 MMHG

## 2021-07-01 VITALS
HEART RATE: 69 BPM | WEIGHT: 226.2 LBS | DIASTOLIC BLOOD PRESSURE: 78 MMHG | HEIGHT: 62 IN | SYSTOLIC BLOOD PRESSURE: 142 MMHG | BODY MASS INDEX: 41.62 KG/M2 | TEMPERATURE: 98 F

## 2021-07-02 VITALS
SYSTOLIC BLOOD PRESSURE: 155 MMHG | TEMPERATURE: 97.5 F | HEIGHT: 62 IN | WEIGHT: 199 LBS | HEART RATE: 60 BPM | BODY MASS INDEX: 36.62 KG/M2 | DIASTOLIC BLOOD PRESSURE: 68 MMHG

## 2021-07-02 VITALS
TEMPERATURE: 97.3 F | SYSTOLIC BLOOD PRESSURE: 220 MMHG | BODY MASS INDEX: 36.58 KG/M2 | HEART RATE: 67 BPM | DIASTOLIC BLOOD PRESSURE: 59 MMHG | WEIGHT: 198.8 LBS | HEIGHT: 62 IN

## 2021-07-02 VITALS
HEART RATE: 67 BPM | TEMPERATURE: 97.8 F | DIASTOLIC BLOOD PRESSURE: 47 MMHG | WEIGHT: 212 LBS | SYSTOLIC BLOOD PRESSURE: 144 MMHG | HEIGHT: 62 IN | BODY MASS INDEX: 39.01 KG/M2

## 2021-07-02 VITALS
BODY MASS INDEX: 40.23 KG/M2 | HEIGHT: 62 IN | TEMPERATURE: 97.1 F | HEART RATE: 59 BPM | DIASTOLIC BLOOD PRESSURE: 64 MMHG | WEIGHT: 218.6 LBS | SYSTOLIC BLOOD PRESSURE: 162 MMHG

## 2021-07-06 ENCOUNTER — TELEPHONE (OUTPATIENT)
Dept: CASE MANAGEMENT | Facility: OTHER | Age: 81
End: 2021-07-06

## 2021-07-06 DIAGNOSIS — I48.0 PAROXYSMAL ATRIAL FIBRILLATION (HCC): Primary | ICD-10-CM

## 2021-07-06 NOTE — TELEPHONE ENCOUNTER
Mary called to report that she has an appointment tomorrow with Dr. Painting (cardiologist).  She has expressed a few times that she just does not care for him and doesn't want to make the drive to [place]. I asked how she got with him and she said it was when she was in the hospital with covid, they scheduled a follow up with him.  She said, I don't even know why she needed a cardiologist.  I told her that it appears that she was diagnosed with a-fib while in the hospital with covid.  She is interested in seeing the cardiologist upstairs in our office.  I told her that sounded reasonable.  She can discuss further at her appointment in October and referral can be made at that time.

## 2021-07-12 ENCOUNTER — CLINICAL SUPPORT (OUTPATIENT)
Dept: FAMILY MEDICINE CLINIC | Age: 81
End: 2021-07-12

## 2021-07-12 DIAGNOSIS — J30.9 ALLERGIC RHINITIS, UNSPECIFIED SEASONALITY, UNSPECIFIED TRIGGER: Primary | ICD-10-CM

## 2021-07-12 PROCEDURE — 95117 IMMUNOTHERAPY INJECTIONS: CPT | Performed by: FAMILY MEDICINE

## 2021-07-12 NOTE — PROGRESS NOTES
I have reviewed the notes, assessments, and/or procedures performed by Areli Lake LPN, and I concur with her documentation of Mary Chopra.

## 2021-07-20 ENCOUNTER — PATIENT OUTREACH (OUTPATIENT)
Dept: CASE MANAGEMENT | Facility: OTHER | Age: 81
End: 2021-07-20

## 2021-07-20 NOTE — OUTREACH NOTE
Patient Outreach    Called Mary to check on her. She just saw Dr. Painting and was very pleased.  He has scheduled her for a stress test scheduled at [facility] in August. She also is having a colonoscopy at the end of the month with her daughter.  We discussed about her insulin dose and what to do with the prep.  We reviewed her care gaps and care team updated.  There was a question about a statin and if she had taken in the past.  Informed her that she had been on Crestor in the past and was stopped due to joint pain, not muscle pain.  Mary states she still has joint pain.  She does have a significant family history of cardiac issues.  She was instructed to call Dr. Painting office to report about the statin.  Discussed Silver sneakers with her and encouraged her to join.

## 2021-07-21 RX ORDER — ALLOPURINOL 300 MG/1
TABLET ORAL
Qty: 90 TABLET | Refills: 1 | Status: SHIPPED | OUTPATIENT
Start: 2021-07-21 | End: 2022-01-24

## 2021-07-23 ENCOUNTER — CLINICAL SUPPORT (OUTPATIENT)
Dept: FAMILY MEDICINE CLINIC | Age: 81
End: 2021-07-23

## 2021-07-23 DIAGNOSIS — J30.9 ALLERGIC RHINITIS, UNSPECIFIED SEASONALITY, UNSPECIFIED TRIGGER: Primary | ICD-10-CM

## 2021-07-23 PROCEDURE — 95117 IMMUNOTHERAPY INJECTIONS: CPT | Performed by: FAMILY MEDICINE

## 2021-07-25 ENCOUNTER — TELEPHONE (OUTPATIENT)
Dept: FAMILY MEDICINE CLINIC | Age: 81
End: 2021-07-25

## 2021-07-27 RX ORDER — BENAZEPRIL HYDROCHLORIDE 40 MG/1
TABLET, FILM COATED ORAL
Qty: 90 TABLET | Refills: 1 | Status: SHIPPED | OUTPATIENT
Start: 2021-07-27 | End: 2021-07-28 | Stop reason: SDUPTHER

## 2021-07-28 ENCOUNTER — TELEPHONE (OUTPATIENT)
Dept: FAMILY MEDICINE CLINIC | Age: 81
End: 2021-07-28

## 2021-07-28 DIAGNOSIS — Z12.31 ENCOUNTER FOR SCREENING MAMMOGRAM FOR MALIGNANT NEOPLASM OF BREAST: Primary | ICD-10-CM

## 2021-07-28 RX ORDER — BENAZEPRIL HYDROCHLORIDE 20 MG/1
20 TABLET ORAL DAILY
Qty: 90 TABLET | Refills: 1 | Status: SHIPPED | OUTPATIENT
Start: 2021-07-28 | End: 2022-01-24

## 2021-07-28 NOTE — TELEPHONE ENCOUNTER
Left detailed message that it was time for her mammo and I will have someone call and get that scheduled with her.  Please sign order.

## 2021-07-28 NOTE — TELEPHONE ENCOUNTER
Mary called to ask since she is cutting the prescription for her benzaepril 40 mg tabs in half, if you could just send in a different prescription for the 20mg. ( I will call Leanna to cancel the 40's).

## 2021-07-30 ENCOUNTER — CLINICAL SUPPORT (OUTPATIENT)
Dept: FAMILY MEDICINE CLINIC | Age: 81
End: 2021-07-30

## 2021-07-30 DIAGNOSIS — J30.9 ALLERGIC RHINITIS, UNSPECIFIED SEASONALITY, UNSPECIFIED TRIGGER: Primary | ICD-10-CM

## 2021-07-30 PROCEDURE — 95117 IMMUNOTHERAPY INJECTIONS: CPT | Performed by: FAMILY MEDICINE

## 2021-08-03 ENCOUNTER — PATIENT OUTREACH (OUTPATIENT)
Dept: CASE MANAGEMENT | Facility: OTHER | Age: 81
End: 2021-08-03

## 2021-08-03 NOTE — OUTREACH NOTE
Patient Outreach    Called [facility] for a copy of the colonoscopy.  Called to touch base with Mary and left a message. She can call back if she wishes.  She was supposed to have a stress test yesterday also.      Patient Outreach    Spoke with Mary, she did not have her colonoscopy despite doing the prep.  She states that they saw that she was doing a stress test and informed her that it would be too dangerous to put her to sleep.  Once she gets the results she can reschedule her colonscopy then.  Left her a message regarding her advanced directive.

## 2021-08-09 ENCOUNTER — PATIENT OUTREACH (OUTPATIENT)
Dept: CASE MANAGEMENT | Facility: OTHER | Age: 81
End: 2021-08-09

## 2021-08-09 RX ORDER — LEVOTHYROXINE SODIUM 0.07 MG/1
TABLET ORAL
Qty: 90 TABLET | Refills: 0 | Status: SHIPPED | OUTPATIENT
Start: 2021-08-09 | End: 2021-11-01

## 2021-08-09 RX ORDER — FUROSEMIDE 20 MG/1
TABLET ORAL
Qty: 90 TABLET | Refills: 0 | Status: SHIPPED | OUTPATIENT
Start: 2021-08-09 | End: 2021-11-09

## 2021-08-09 NOTE — OUTREACH NOTE
Care Coordination    Mary called to report that she was told that her stress test was normal and to follow up in 1 year (Cardiology - Dr. Painting).  She also states that he is wanting her to try crestor again, but at a very low dose.  Chart review that she was on in 2016 and she was having myalgia and stopped at that time and she determined that crestor was not the problem, still has achy.  She is going to have daughter reschedule colonoscopy

## 2021-08-10 ENCOUNTER — CLINICAL SUPPORT (OUTPATIENT)
Dept: FAMILY MEDICINE CLINIC | Age: 81
End: 2021-08-10

## 2021-08-10 DIAGNOSIS — J30.9 ALLERGIC RHINITIS, UNSPECIFIED SEASONALITY, UNSPECIFIED TRIGGER: Primary | ICD-10-CM

## 2021-08-10 PROCEDURE — 95117 IMMUNOTHERAPY INJECTIONS: CPT | Performed by: FAMILY MEDICINE

## 2021-08-10 RX ORDER — APIXABAN 5 MG/1
TABLET, FILM COATED ORAL
Qty: 180 TABLET | Refills: 1 | Status: SHIPPED | OUTPATIENT
Start: 2021-08-10 | End: 2022-02-07

## 2021-08-10 NOTE — PROGRESS NOTES
I have reviewed the notes, assessments, and/or procedures performed by the MA/LPN and I concur with his/her documentation of the patient's care.     Felipe Montero MD   8/10/2021 18:17 EDT

## 2021-08-11 RX ORDER — ROSUVASTATIN CALCIUM 5 MG/1
5 TABLET, COATED ORAL NIGHTLY
Qty: 30 TABLET | Refills: 5 | Status: SHIPPED | OUTPATIENT
Start: 2021-08-11 | End: 2022-01-31

## 2021-08-11 NOTE — PROGRESS NOTES
Prescription sent for low-dose Crestor 5 mg daily.  Let me know how she does with that.  I am glad that she is going to get the colonoscopy rescheduled.  Thanks, BZYA

## 2021-08-14 NOTE — PROGRESS NOTES
Thanks for the clarification.  I would actually like her to hold her Zetia right now and just do the Crestor and fenofibrate for the time being.  Thanks, LELE

## 2021-08-16 RX ORDER — GLIMEPIRIDE 4 MG/1
TABLET ORAL
Qty: 180 TABLET | Refills: 0 | Status: SHIPPED | OUTPATIENT
Start: 2021-08-16 | End: 2021-11-15

## 2021-08-20 ENCOUNTER — CLINICAL SUPPORT (OUTPATIENT)
Dept: FAMILY MEDICINE CLINIC | Age: 81
End: 2021-08-20

## 2021-08-20 ENCOUNTER — HOSPITAL ENCOUNTER (OUTPATIENT)
Dept: MAMMOGRAPHY | Facility: HOSPITAL | Age: 81
Discharge: HOME | End: 2021-08-20
Admitting: FAMILY MEDICINE

## 2021-08-20 DIAGNOSIS — J30.9 ALLERGIC RHINITIS, UNSPECIFIED SEASONALITY, UNSPECIFIED TRIGGER: Primary | ICD-10-CM

## 2021-08-20 DIAGNOSIS — Z12.31 ENCOUNTER FOR SCREENING MAMMOGRAM FOR MALIGNANT NEOPLASM OF BREAST: ICD-10-CM

## 2021-08-20 PROCEDURE — 77063 BREAST TOMOSYNTHESIS BI: CPT

## 2021-08-20 PROCEDURE — 77067 SCR MAMMO BI INCL CAD: CPT

## 2021-08-20 PROCEDURE — 95117 IMMUNOTHERAPY INJECTIONS: CPT | Performed by: FAMILY MEDICINE

## 2021-08-24 RX ORDER — METOPROLOL SUCCINATE 100 MG/1
TABLET, EXTENDED RELEASE ORAL
Qty: 180 TABLET | Refills: 1 | Status: SHIPPED | OUTPATIENT
Start: 2021-08-24 | End: 2021-11-30

## 2021-09-02 ENCOUNTER — CLINICAL SUPPORT (OUTPATIENT)
Dept: FAMILY MEDICINE CLINIC | Age: 81
End: 2021-09-02

## 2021-09-02 DIAGNOSIS — J30.9 ALLERGIC RHINITIS, UNSPECIFIED SEASONALITY, UNSPECIFIED TRIGGER: Primary | ICD-10-CM

## 2021-09-02 PROCEDURE — 95117 IMMUNOTHERAPY INJECTIONS: CPT | Performed by: FAMILY MEDICINE

## 2021-09-21 ENCOUNTER — PATIENT OUTREACH (OUTPATIENT)
Dept: CASE MANAGEMENT | Facility: OTHER | Age: 81
End: 2021-09-21

## 2021-09-21 NOTE — OUTREACH NOTE
Patient Outreach    Called Mary to check in with her.  She says for the 2nd time they denied her the colonoscopy due to high blood pressure.  She had a time with getting the IV started, she was put on the machine and it constantly kept pumping up and squeezing her arm and they were badgering her about why she did not bring her medications, she notes that on the instructions that it only pertained to diabetic meds and not blood pressure meds, she was aggravated and her blood pressure was raising by the minute.  I have asked her to have her blood pressure checked the next couple of times she is in for an allergy shot so we can have a couple of readings under our belt before her visit with Dr. Donohue.     Ambulatory Case Management Note      Maegan Banks RN  Ambulatory Case Management    9/21/2021, 13:43 EDT

## 2021-09-23 ENCOUNTER — CLINICAL SUPPORT (OUTPATIENT)
Dept: FAMILY MEDICINE CLINIC | Age: 81
End: 2021-09-23

## 2021-09-23 VITALS — SYSTOLIC BLOOD PRESSURE: 154 MMHG | DIASTOLIC BLOOD PRESSURE: 62 MMHG | HEART RATE: 60 BPM

## 2021-09-23 DIAGNOSIS — J30.9 ALLERGIC RHINITIS, UNSPECIFIED SEASONALITY, UNSPECIFIED TRIGGER: Primary | ICD-10-CM

## 2021-09-23 PROCEDURE — 95117 IMMUNOTHERAPY INJECTIONS: CPT | Performed by: FAMILY MEDICINE

## 2021-09-28 RX ORDER — SYRING-NEEDL,DISP,INSUL,0.3 ML 31GX15/64"
SYRINGE, EMPTY DISPOSABLE MISCELLANEOUS
Qty: 100 EACH | Refills: 5 | Status: SHIPPED | OUTPATIENT
Start: 2021-09-28

## 2021-09-29 ENCOUNTER — CLINICAL SUPPORT (OUTPATIENT)
Dept: FAMILY MEDICINE CLINIC | Age: 81
End: 2021-09-29

## 2021-09-29 VITALS — DIASTOLIC BLOOD PRESSURE: 71 MMHG | SYSTOLIC BLOOD PRESSURE: 168 MMHG | HEART RATE: 56 BPM

## 2021-09-29 DIAGNOSIS — J30.9 ALLERGIC RHINITIS, UNSPECIFIED SEASONALITY, UNSPECIFIED TRIGGER: Primary | ICD-10-CM

## 2021-09-29 PROCEDURE — 95117 IMMUNOTHERAPY INJECTIONS: CPT | Performed by: FAMILY MEDICINE

## 2021-10-08 ENCOUNTER — CLINICAL SUPPORT (OUTPATIENT)
Dept: FAMILY MEDICINE CLINIC | Age: 81
End: 2021-10-08

## 2021-10-08 DIAGNOSIS — J30.9 ALLERGIC RHINITIS, UNSPECIFIED SEASONALITY, UNSPECIFIED TRIGGER: Primary | ICD-10-CM

## 2021-10-08 PROCEDURE — 95117 IMMUNOTHERAPY INJECTIONS: CPT | Performed by: FAMILY MEDICINE

## 2021-10-18 ENCOUNTER — PATIENT OUTREACH (OUTPATIENT)
Dept: CASE MANAGEMENT | Facility: OTHER | Age: 81
End: 2021-10-18

## 2021-10-18 NOTE — OUTREACH NOTE
Called and left a message for Mary to check in with her.  Her appointment with Dr. Donohue has been changed and I was going to remind her of this appointment.  Changed my calendar.

## 2021-10-20 ENCOUNTER — CLINICAL SUPPORT (OUTPATIENT)
Dept: FAMILY MEDICINE CLINIC | Age: 81
End: 2021-10-20

## 2021-10-20 VITALS — HEART RATE: 56 BPM | DIASTOLIC BLOOD PRESSURE: 68 MMHG | SYSTOLIC BLOOD PRESSURE: 156 MMHG

## 2021-10-20 DIAGNOSIS — J30.9 ALLERGIC RHINITIS, UNSPECIFIED SEASONALITY, UNSPECIFIED TRIGGER: Primary | ICD-10-CM

## 2021-10-20 PROCEDURE — 95117 IMMUNOTHERAPY INJECTIONS: CPT | Performed by: FAMILY MEDICINE

## 2021-11-01 RX ORDER — LEVOTHYROXINE SODIUM 0.07 MG/1
TABLET ORAL
Qty: 90 TABLET | Refills: 0 | Status: SHIPPED | OUTPATIENT
Start: 2021-11-01 | End: 2022-02-07

## 2021-11-02 ENCOUNTER — OFFICE VISIT (OUTPATIENT)
Dept: FAMILY MEDICINE CLINIC | Age: 81
End: 2021-11-02

## 2021-11-02 ENCOUNTER — PATIENT OUTREACH (OUTPATIENT)
Dept: CASE MANAGEMENT | Facility: OTHER | Age: 81
End: 2021-11-02

## 2021-11-02 VITALS
TEMPERATURE: 98 F | HEIGHT: 62 IN | WEIGHT: 201 LBS | BODY MASS INDEX: 36.99 KG/M2 | SYSTOLIC BLOOD PRESSURE: 159 MMHG | DIASTOLIC BLOOD PRESSURE: 75 MMHG | HEART RATE: 60 BPM

## 2021-11-02 DIAGNOSIS — I48.0 PAROXYSMAL ATRIAL FIBRILLATION (HCC): ICD-10-CM

## 2021-11-02 DIAGNOSIS — E11.65 TYPE 2 DIABETES MELLITUS WITH HYPERGLYCEMIA, WITHOUT LONG-TERM CURRENT USE OF INSULIN (HCC): Primary | ICD-10-CM

## 2021-11-02 DIAGNOSIS — M15.9 GENERALIZED OSTEOARTHRITIS: ICD-10-CM

## 2021-11-02 DIAGNOSIS — E78.2 MIXED HYPERLIPIDEMIA: ICD-10-CM

## 2021-11-02 DIAGNOSIS — E03.9 ACQUIRED HYPOTHYROIDISM: ICD-10-CM

## 2021-11-02 DIAGNOSIS — Z23 ENCOUNTER FOR IMMUNIZATION: ICD-10-CM

## 2021-11-02 DIAGNOSIS — M25.562 CHRONIC PAIN OF BOTH KNEES: ICD-10-CM

## 2021-11-02 DIAGNOSIS — G89.29 CHRONIC PAIN OF BOTH KNEES: ICD-10-CM

## 2021-11-02 DIAGNOSIS — I27.20 PULMONARY HYPERTENSION (HCC): ICD-10-CM

## 2021-11-02 DIAGNOSIS — I10 ESSENTIAL HYPERTENSION: ICD-10-CM

## 2021-11-02 DIAGNOSIS — K21.9 GASTROESOPHAGEAL REFLUX DISEASE WITHOUT ESOPHAGITIS: ICD-10-CM

## 2021-11-02 DIAGNOSIS — M25.561 CHRONIC PAIN OF BOTH KNEES: ICD-10-CM

## 2021-11-02 DIAGNOSIS — Z79.899 HIGH RISK MEDICATION USE: ICD-10-CM

## 2021-11-02 PROBLEM — Z00.00 ANNUAL PHYSICAL EXAM: Status: RESOLVED | Noted: 2021-06-23 | Resolved: 2021-11-02

## 2021-11-02 LAB
AMPHET+METHAMPHET UR QL: NEGATIVE
AMPHETAMINES UR QL: NEGATIVE
BARBITURATES UR QL SCN: NEGATIVE
BENZODIAZ UR QL SCN: NEGATIVE
BUPRENORPHINE SERPL-MCNC: NEGATIVE NG/ML
CANNABINOIDS SERPL QL: NEGATIVE
COCAINE UR QL: NEGATIVE
EXPIRATION DATE: NORMAL
Lab: NORMAL
MDMA UR QL SCN: NEGATIVE
METHADONE UR QL SCN: NEGATIVE
OPIATES UR QL: NEGATIVE
OXYCODONE UR QL SCN: NEGATIVE
PCP UR QL SCN: NEGATIVE

## 2021-11-02 PROCEDURE — 90662 IIV NO PRSV INCREASED AG IM: CPT | Performed by: FAMILY MEDICINE

## 2021-11-02 PROCEDURE — 80307 DRUG TEST PRSMV CHEM ANLYZR: CPT | Performed by: FAMILY MEDICINE

## 2021-11-02 PROCEDURE — G0008 ADMIN INFLUENZA VIRUS VAC: HCPCS | Performed by: FAMILY MEDICINE

## 2021-11-02 PROCEDURE — 99214 OFFICE O/P EST MOD 30 MIN: CPT | Performed by: FAMILY MEDICINE

## 2021-11-02 PROCEDURE — 80305 DRUG TEST PRSMV DIR OPT OBS: CPT | Performed by: FAMILY MEDICINE

## 2021-11-02 RX ORDER — HYDROCODONE BITARTRATE AND ACETAMINOPHEN 5; 325 MG/1; MG/1
1 TABLET ORAL DAILY PRN
Qty: 15 TABLET | Refills: 0 | Status: SHIPPED | OUTPATIENT
Start: 2021-11-02 | End: 2022-03-03 | Stop reason: SDUPTHER

## 2021-11-02 RX ORDER — BENAZEPRIL HYDROCHLORIDE 40 MG/1
1 TABLET, FILM COATED ORAL DAILY
COMMUNITY
Start: 2021-07-28 | End: 2022-01-24

## 2021-11-02 RX ORDER — BLOOD-GLUCOSE METER
KIT MISCELLANEOUS
Qty: 300 EACH | Refills: 1 | Status: SHIPPED | OUTPATIENT
Start: 2021-11-02 | End: 2022-06-22

## 2021-11-02 RX ORDER — FENOFIBRATE 48 MG/1
TABLET, COATED ORAL
Qty: 90 TABLET | Refills: 1 | Status: SHIPPED | OUTPATIENT
Start: 2021-11-02 | End: 2022-05-02

## 2021-11-02 NOTE — ASSESSMENT & PLAN NOTE
She is on glimepiride, metformin and insulin NPH for diabetes.  No refills needed.  Her blood sugars have been looking much better for the past 1.5 weeks.  Checking labs.  She is on ASA and an ACEI.  She has not been able to tolerate statins but is on Zetia for HLD.  She is UTD on eye exam, last done 11/2020.  She is UTD on foot exam.

## 2021-11-02 NOTE — ASSESSMENT & PLAN NOTE
Stable on current regimen.  Symptoms are well controlled.  No adverse effects. She does require ongoing use of this controlled substance to function.  Tox screen was due today.  Prior tox screen appropriate.  Bran was run today.  Refills were needed today.  RTC 4 months.

## 2021-11-02 NOTE — PROGRESS NOTES
"Chief Complaint  Diabetes (follow up)    Subjective          Mary Chopra presents to [facility] today for routine follow-up on chronic issues.     She is having a lot of pain the bilateral knees.  \"When I walk down the sanches, they wobble.\"  Pain is what is holding her back.  Pain is sharp.      She is on glimepiride, metformin and insulin NPH for diabetes.  Her blood sugars have been looking much better for the past 1.5 weeks.  She is on ASA and an ACEI.  She has not been able to tolerate statins but is on Zetia for HLD.  She is UTD on eye exam, last done 11/2020.  She is UTD on foot exam.        She is on benazepril, metoprolol succinate, and doxazosin for HTN.  She denies CP, SOB, or palpitations.    She is on fenofibrate for elevated TGs.      She is on Eliquis for a-fib.  She is on metoprolol succinate for rate control.  She did have a normal stress test recently.  Following with Cardiology yearly.  +Pulmonary HTN, they are monitoring.      She is on levothyroxine for hypothyroidism.  She denies heat/cold intolerance.    She is on omeprazole for GERD.  No indigestion or reflux.      She is on Flonase, azelastine and Singulair for allergies.  She has struggled to afford inhalers for asthma.      She is on allopurinol for gout.  She denies recent flares.      She has been having severe diffuse joint pains.  She has used hydrocodone/APAP in the past but uses this extremely rarely.  She has been using Tylenol primarily.  Does want to get a refill on that today.  She keeps just a small supply.    She is due for COVID booster.  Still has some brain fog from her previous COVID infection.      Current Outpatient Medications:   •  allopurinol (ZYLOPRIM) 300 MG tablet, TAKE ONE TABLET BY MOUTH DAILY, Disp: 90 tablet, Rfl: 1  •  amLODIPine (NORVASC) 5 MG tablet, Take 1 tablet by mouth Daily., Disp: 90 tablet, Rfl: 1  •  aspirin 81 MG EC tablet, Take 81 mg by mouth Daily., Disp: , Rfl: " "  •  BD Veo Insulin Syringe U/F 31G X 15/64\" 0.3 ML misc, USE WITH INSULIN THREE TIMES A DAY AND AS NEEDED, Disp: 100 each, Rfl: 5  •  benazepril (LOTENSIN) 20 MG tablet, Take 1 tablet by mouth Daily., Disp: 90 tablet, Rfl: 1  •  benazepril (LOTENSIN) 40 MG tablet, Take 1 tablet by mouth Daily., Disp: , Rfl:   •  Dextromethorphan-guaiFENesin (MUCINEX DM PO), Take 1 tablet by mouth Daily., Disp: , Rfl:   •  Eliquis 5 MG tablet tablet, TAKE ONE TABLET BY MOUTH TWICE A DAY, Disp: 180 tablet, Rfl: 1  •  fenofibrate (TRICOR) 48 MG tablet, TAKE ONE TABLET BY MOUTH DAILY, Disp: 90 tablet, Rfl: 1  •  fluticasone (VERAMYST) 27.5 MCG/SPRAY nasal spray, 2 sprays into the nostril(s) as directed by provider Daily., Disp: , Rfl:   •  FREESTYLE LITE test strip, USE TO CHECK BLOOD SUGAR THREE TIMES A DAY, Disp: 300 each, Rfl: 1  •  furosemide (LASIX) 20 MG tablet, TAKE 1 TABLET BY MOUTH DAILY, Disp: 90 tablet, Rfl: 0  •  glimepiride (AMARYL) 4 MG tablet, TAKE ONE TABLET BY MOUTH TWICE A DAY, Disp: 180 tablet, Rfl: 0  •  insulin NPH (humuLIN N,novoLIN N) 100 UNIT/ML injection, Inject 8 Units under the skin into the appropriate area as directed 2 (two) times a day., Disp: 5 mL, Rfl: 5  •  levothyroxine (SYNTHROID, LEVOTHROID) 50 MCG tablet, Take 75 mcg by mouth Daily., Disp: , Rfl:   •  levothyroxine (SYNTHROID, LEVOTHROID) 75 MCG tablet, TAKE ONE TABLET BY MOUTH DAILY, Disp: 90 tablet, Rfl: 0  •  metFORMIN (GLUCOPHAGE) 500 MG tablet, TAKE TWO TABLETS BY MOUTH EVERY MORNING AND TAKE ONE TABLET BY MOUTH EVERY EVENING, Disp: 270 tablet, Rfl: 1  •  metoprolol succinate XL (TOPROL-XL) 100 MG 24 hr tablet, TAKE ONE TABLET BY MOUTH TWICE A DAY, Disp: 180 tablet, Rfl: 1  •  montelukast (SINGULAIR) 10 MG tablet, Take 10 mg by mouth Daily., Disp: , Rfl:   •  omeprazole (priLOSEC) 20 MG capsule, Take 20 mg by mouth Daily As Needed., Disp: , Rfl:   •  rosuvastatin (CRESTOR) 5 MG tablet, Take 1 tablet by mouth Every Night., Disp: 30 tablet, Rfl: " "5  •  traZODone (DESYREL) 100 MG tablet, Take 100 mg by mouth At Night As Needed., Disp: , Rfl:   •  HYDROcodone-acetaminophen (NORCO) 5-325 MG per tablet, Take 1 tablet by mouth Daily As Needed for Moderate Pain ., Disp: 15 tablet, Rfl: 0    Allergies:  Penicillins, Shellfish-derived products, Avelox [moxifloxacin], Banana, Keflex [cephalexin], Monopril [fosinopril], Serevent [salmeterol], Sulfa antibiotics, Talwin [pentazocine], and Claritin [loratadine]      Objective   Vital Signs:   /75 (BP Location: Right arm, Patient Position: Sitting)   Pulse 60   Temp 98 °F (36.7 °C) (Oral)   Ht 156.2 cm (61.5\")   Wt 91.2 kg (201 lb)   BMI 37.36 kg/m²     Physical Exam  Vitals reviewed.   Constitutional:       General: She is not in acute distress.     Appearance: Normal appearance. She is well-developed.   HENT:      Head: Normocephalic and atraumatic.      Right Ear: External ear normal.      Left Ear: External ear normal.      Nose: Nose normal.      Mouth/Throat:      Mouth: Mucous membranes are moist.   Eyes:      Extraocular Movements: Extraocular movements intact.      Conjunctiva/sclera: Conjunctivae normal.      Pupils: Pupils are equal, round, and reactive to light.   Cardiovascular:      Rate and Rhythm: Normal rate. Rhythm irregularly irregular.      Heart sounds: No murmur heard.      Pulmonary:      Effort: Pulmonary effort is normal.      Breath sounds: Normal breath sounds. No wheezing, rhonchi or rales.   Abdominal:      General: Bowel sounds are normal. There is no distension.      Palpations: Abdomen is soft.      Tenderness: There is no abdominal tenderness.   Musculoskeletal:         General: Normal range of motion.   Neurological:      Mental Status: She is alert.   Psychiatric:         Mood and Affect: Affect normal.             Assessment and Plan    Diagnoses and all orders for this visit:    1. Type 2 diabetes mellitus with hyperglycemia, without long-term current use of insulin (HCC) " (Primary)  Assessment & Plan:  She is on glimepiride, metformin and insulin NPH for diabetes.  No refills needed.  Her blood sugars have been looking much better for the past 1.5 weeks.  Checking labs.  She is on ASA and an ACEI.  She has not been able to tolerate statins but is on Zetia for HLD.  She is UTD on eye exam, last done 11/2020.  She is UTD on foot exam.      Orders:  -     Comprehensive Metabolic Panel; Future  -     Lipid Panel; Future  -     Hemoglobin A1c; Future    2. Essential hypertension  Assessment & Plan:  Stable.  Checking labs.  No refills needed.      3. Mixed hyperlipidemia  Assessment & Plan:  Stable.  Checking labs.  No refills needed.      4. Paroxysmal atrial fibrillation (HCC)  Assessment & Plan:  Stable.  No refills needed.  Checking labs.  Following with Cardiology.      5. Pulmonary hypertension (HCC)  Assessment & Plan:  Following with Cardiology.      6. Acquired hypothyroidism  Assessment & Plan:  Stable.  Checking labs.  No refills needed.    Orders:  -     TSH; Future    7. Generalized osteoarthritis  Assessment & Plan:  Stable on current regimen.  Symptoms are well controlled.  No adverse effects. She does require ongoing use of this controlled substance to function.  Tox screen was due today.  Prior tox screen appropriate.  Bran was run today.  Refills were needed today.  RTC 4 months.      Orders:  -     HYDROcodone-acetaminophen (NORCO) 5-325 MG per tablet; Take 1 tablet by mouth Daily As Needed for Moderate Pain .  Dispense: 15 tablet; Refill: 0    8. High risk medication use  -     POC Urine Drug Screen [facility]-C[facility]    9. Chronic pain of both knees  Assessment & Plan:  Checking XRs.  Would like to go see Dr. Chetan Sanchez if she needs an orthopedist.  She thinks she would probably like to see Ortho.    Orders:  -     XR Knee 3 View Left; Future  -     XR Knee 3 View Right; Future    10. Gastroesophageal reflux disease without esophagitis  Assessment & Plan:  Stable.   Checking labs.      11. Encounter for immunization  -     Fluzone High-Dose 65+yrs      Follow Up   Return in about 4 months (around 3/2/2022) for Recheck.  Patient was given instructions and counseling regarding her condition or for health maintenance advice. Please see specific information pulled into the AVS if appropriate.

## 2021-11-02 NOTE — ASSESSMENT & PLAN NOTE
Checking XRs.  Would like to go see Dr. Chetan Sanchez if she needs an orthopedist.  She thinks she would probably like to see Ortho.

## 2021-11-05 ENCOUNTER — CLINICAL SUPPORT (OUTPATIENT)
Dept: FAMILY MEDICINE CLINIC | Age: 81
End: 2021-11-05

## 2021-11-05 DIAGNOSIS — J30.9 ALLERGIC RHINITIS, UNSPECIFIED SEASONALITY, UNSPECIFIED TRIGGER: Primary | ICD-10-CM

## 2021-11-05 PROCEDURE — 95117 IMMUNOTHERAPY INJECTIONS: CPT | Performed by: FAMILY MEDICINE

## 2021-11-09 RX ORDER — FUROSEMIDE 20 MG/1
TABLET ORAL
Qty: 90 TABLET | Refills: 1 | Status: SHIPPED | OUTPATIENT
Start: 2021-11-09 | End: 2022-05-16

## 2021-11-12 ENCOUNTER — CLINICAL SUPPORT (OUTPATIENT)
Dept: FAMILY MEDICINE CLINIC | Age: 81
End: 2021-11-12

## 2021-11-12 DIAGNOSIS — J30.9 ALLERGIC RHINITIS, UNSPECIFIED SEASONALITY, UNSPECIFIED TRIGGER: Primary | ICD-10-CM

## 2021-11-12 LAB
LABORATORY COMMENT REPORT: NORMAL
OPIATES UR QL SCN: NEGATIVE NG/ML

## 2021-11-12 PROCEDURE — 95117 IMMUNOTHERAPY INJECTIONS: CPT | Performed by: FAMILY MEDICINE

## 2021-11-15 RX ORDER — GLIMEPIRIDE 4 MG/1
TABLET ORAL
Qty: 180 TABLET | Refills: 0 | Status: SHIPPED | OUTPATIENT
Start: 2021-11-15 | End: 2022-02-15

## 2021-11-22 ENCOUNTER — CLINICAL SUPPORT (OUTPATIENT)
Dept: FAMILY MEDICINE CLINIC | Age: 81
End: 2021-11-22

## 2021-11-22 DIAGNOSIS — J30.1 ALLERGIC RHINITIS DUE TO POLLEN, UNSPECIFIED SEASONALITY: Primary | ICD-10-CM

## 2021-11-22 DIAGNOSIS — J30.9 ALLERGIC RHINITIS, UNSPECIFIED SEASONALITY, UNSPECIFIED TRIGGER: Primary | ICD-10-CM

## 2021-11-22 PROCEDURE — 95117 IMMUNOTHERAPY INJECTIONS: CPT | Performed by: FAMILY MEDICINE

## 2021-11-22 RX ORDER — FLUTICASONE PROPIONATE 50 MCG
2 SPRAY, SUSPENSION (ML) NASAL DAILY
COMMUNITY
End: 2021-11-22 | Stop reason: SDUPTHER

## 2021-11-23 RX ORDER — FLUTICASONE PROPIONATE 50 MCG
2 SPRAY, SUSPENSION (ML) NASAL DAILY
Qty: 9.9 ML | Refills: 11 | Status: SHIPPED | OUTPATIENT
Start: 2021-11-23

## 2021-11-30 RX ORDER — METOPROLOL SUCCINATE 100 MG/1
TABLET, EXTENDED RELEASE ORAL
Qty: 180 TABLET | Refills: 1 | Status: SHIPPED | OUTPATIENT
Start: 2021-11-30 | End: 2022-07-06 | Stop reason: SDUPTHER

## 2021-12-03 ENCOUNTER — CLINICAL SUPPORT (OUTPATIENT)
Dept: FAMILY MEDICINE CLINIC | Age: 81
End: 2021-12-03

## 2021-12-03 DIAGNOSIS — J30.9 ALLERGIC RHINITIS, UNSPECIFIED SEASONALITY, UNSPECIFIED TRIGGER: Primary | ICD-10-CM

## 2021-12-03 PROCEDURE — 95117 IMMUNOTHERAPY INJECTIONS: CPT | Performed by: FAMILY MEDICINE

## 2021-12-10 ENCOUNTER — CLINICAL SUPPORT (OUTPATIENT)
Dept: FAMILY MEDICINE CLINIC | Age: 81
End: 2021-12-10

## 2021-12-10 DIAGNOSIS — J30.9 ALLERGIC RHINITIS, UNSPECIFIED SEASONALITY, UNSPECIFIED TRIGGER: Primary | ICD-10-CM

## 2021-12-10 PROCEDURE — 95117 IMMUNOTHERAPY INJECTIONS: CPT | Performed by: FAMILY MEDICINE

## 2021-12-17 ENCOUNTER — PATIENT OUTREACH (OUTPATIENT)
Dept: CASE MANAGEMENT | Facility: OTHER | Age: 81
End: 2021-12-17

## 2021-12-17 NOTE — OUTREACH NOTE
Patient Outreach    Chart review.  No colonoscopy yet.  Sees jorge in March.  Will touch [facility] in January.

## 2021-12-20 ENCOUNTER — CLINICAL SUPPORT (OUTPATIENT)
Dept: FAMILY MEDICINE CLINIC | Age: 81
End: 2021-12-20

## 2021-12-20 DIAGNOSIS — J30.9 ALLERGIC RHINITIS, UNSPECIFIED SEASONALITY, UNSPECIFIED TRIGGER: Primary | ICD-10-CM

## 2021-12-20 PROCEDURE — 95117 IMMUNOTHERAPY INJECTIONS: CPT | Performed by: FAMILY MEDICINE

## 2021-12-30 ENCOUNTER — CLINICAL SUPPORT (OUTPATIENT)
Dept: FAMILY MEDICINE CLINIC | Age: 81
End: 2021-12-30

## 2021-12-30 DIAGNOSIS — J30.9 ALLERGIC RHINITIS, UNSPECIFIED SEASONALITY, UNSPECIFIED TRIGGER: Primary | ICD-10-CM

## 2021-12-30 PROCEDURE — 95117 IMMUNOTHERAPY INJECTIONS: CPT | Performed by: FAMILY MEDICINE

## 2022-01-13 ENCOUNTER — PATIENT OUTREACH (OUTPATIENT)
Dept: CASE MANAGEMENT | Facility: OTHER | Age: 82
End: 2022-01-13

## 2022-01-13 ENCOUNTER — CLINICAL SUPPORT (OUTPATIENT)
Dept: FAMILY MEDICINE CLINIC | Age: 82
End: 2022-01-13

## 2022-01-13 DIAGNOSIS — J30.9 ALLERGIC RHINITIS, UNSPECIFIED SEASONALITY, UNSPECIFIED TRIGGER: Primary | ICD-10-CM

## 2022-01-13 PROCEDURE — 95117 IMMUNOTHERAPY INJECTIONS: CPT | Performed by: FAMILY MEDICINE

## 2022-01-13 NOTE — OUTREACH NOTE
Care Coordination    Called and left Mary a message.  Informed will touch base with her again next month, but if she could provide with advanced directive, last date of booster shot and status of colonoscopy, eye exam.  With Covid incidence so high, she is probably on hold for many of these tests.    Called Dr Cowan office to schedule eye exam.  2/23/22 at 1:00, mailed letter

## 2022-01-13 NOTE — PROGRESS NOTES
I have reviewed the notes, assessments, and/or procedures performed by Sheyla Luis LPN, and I concur with her documentation of Mary Chopra.

## 2022-01-24 RX ORDER — BENAZEPRIL HYDROCHLORIDE 20 MG/1
TABLET ORAL
Qty: 90 TABLET | Refills: 1 | Status: SHIPPED | OUTPATIENT
Start: 2022-01-24 | End: 2022-08-01

## 2022-01-24 RX ORDER — ALLOPURINOL 300 MG/1
TABLET ORAL
Qty: 90 TABLET | Refills: 1 | Status: SHIPPED | OUTPATIENT
Start: 2022-01-24 | End: 2022-08-01

## 2022-01-28 DIAGNOSIS — E11.65 TYPE 2 DIABETES MELLITUS WITH HYPERGLYCEMIA, WITHOUT LONG-TERM CURRENT USE OF INSULIN: ICD-10-CM

## 2022-01-28 RX ORDER — INSULIN HUMAN 100 [IU]/ML
INJECTION, SUSPENSION SUBCUTANEOUS
Qty: 6 EACH | Refills: 5 | Status: SHIPPED | OUTPATIENT
Start: 2022-01-28 | End: 2022-03-09 | Stop reason: SDUPTHER

## 2022-01-31 RX ORDER — ROSUVASTATIN CALCIUM 5 MG/1
TABLET, COATED ORAL
Qty: 30 TABLET | Refills: 5 | Status: SHIPPED | OUTPATIENT
Start: 2022-01-31 | End: 2022-08-01

## 2022-02-07 RX ORDER — LEVOTHYROXINE SODIUM 0.07 MG/1
TABLET ORAL
Qty: 90 TABLET | Refills: 0 | Status: SHIPPED | OUTPATIENT
Start: 2022-02-07 | End: 2022-05-09

## 2022-02-08 RX ORDER — APIXABAN 5 MG/1
TABLET, FILM COATED ORAL
Qty: 180 TABLET | Refills: 1 | Status: SHIPPED | OUTPATIENT
Start: 2022-02-08

## 2022-02-15 RX ORDER — GLIMEPIRIDE 4 MG/1
TABLET ORAL
Qty: 180 TABLET | Refills: 1 | Status: SHIPPED | OUTPATIENT
Start: 2022-02-15 | End: 2022-08-08

## 2022-02-18 ENCOUNTER — PATIENT OUTREACH (OUTPATIENT)
Dept: CASE MANAGEMENT | Facility: OTHER | Age: 82
End: 2022-02-18

## 2022-02-18 NOTE — OUTREACH NOTE
Patient Outreach    Called Mary to remind her about her upcoming appointments. She is aware of both appointments.  She has not completed her colonoscopy and she in not sure when she will have it done due to she has gone 2 times and her blood pressure was too high to complete.  She is experiencing a lot of pressure and this is creating stress for her in regards to meeting the care gap measures (eye exam and colonoscopy).  She does note that her blood sugars are out of control, most readings close to 200.  She is unable to exercise due to her joints hurting so bad.  When she gets stressed she admits that she does not eat optimally.  Will continue to follow, supportive care given.

## 2022-03-01 RX ORDER — MONTELUKAST SODIUM 10 MG/1
TABLET ORAL
Qty: 90 TABLET | Refills: 0 | Status: SHIPPED | OUTPATIENT
Start: 2022-03-01 | End: 2022-05-31

## 2022-03-03 ENCOUNTER — OFFICE VISIT (OUTPATIENT)
Dept: FAMILY MEDICINE CLINIC | Age: 82
End: 2022-03-03

## 2022-03-03 ENCOUNTER — HOSPITAL ENCOUNTER (OUTPATIENT)
Dept: GENERAL RADIOLOGY | Facility: HOSPITAL | Age: 82
Discharge: HOME | End: 2022-03-03

## 2022-03-03 ENCOUNTER — REFERRAL TRIAGE (OUTPATIENT)
Dept: CASE MANAGEMENT | Facility: OTHER | Age: 82
End: 2022-03-03

## 2022-03-03 ENCOUNTER — LAB (OUTPATIENT)
Dept: LAB | Facility: HOSPITAL | Age: 82
End: 2022-03-03

## 2022-03-03 VITALS
BODY MASS INDEX: 37.91 KG/M2 | SYSTOLIC BLOOD PRESSURE: 151 MMHG | HEART RATE: 62 BPM | WEIGHT: 206 LBS | HEIGHT: 62 IN | DIASTOLIC BLOOD PRESSURE: 53 MMHG | TEMPERATURE: 98.3 F

## 2022-03-03 DIAGNOSIS — M15.9 GENERALIZED OSTEOARTHRITIS: ICD-10-CM

## 2022-03-03 DIAGNOSIS — M25.551 RIGHT HIP PAIN: ICD-10-CM

## 2022-03-03 DIAGNOSIS — E03.9 ACQUIRED HYPOTHYROIDISM: ICD-10-CM

## 2022-03-03 DIAGNOSIS — G89.29 CHRONIC RIGHT-SIDED LOW BACK PAIN WITH RIGHT-SIDED SCIATICA: ICD-10-CM

## 2022-03-03 DIAGNOSIS — E78.2 MIXED HYPERLIPIDEMIA: ICD-10-CM

## 2022-03-03 DIAGNOSIS — M54.41 CHRONIC RIGHT-SIDED LOW BACK PAIN WITH RIGHT-SIDED SCIATICA: ICD-10-CM

## 2022-03-03 DIAGNOSIS — K21.9 GASTROESOPHAGEAL REFLUX DISEASE WITHOUT ESOPHAGITIS: ICD-10-CM

## 2022-03-03 DIAGNOSIS — E11.65 TYPE 2 DIABETES MELLITUS WITH HYPERGLYCEMIA, WITHOUT LONG-TERM CURRENT USE OF INSULIN: ICD-10-CM

## 2022-03-03 DIAGNOSIS — J45.40 MODERATE PERSISTENT ASTHMA WITHOUT COMPLICATION: ICD-10-CM

## 2022-03-03 DIAGNOSIS — Z79.899 HIGH RISK MEDICATION USE: ICD-10-CM

## 2022-03-03 DIAGNOSIS — I10 ESSENTIAL HYPERTENSION: ICD-10-CM

## 2022-03-03 DIAGNOSIS — I27.20 PULMONARY HYPERTENSION: ICD-10-CM

## 2022-03-03 DIAGNOSIS — E11.65 TYPE 2 DIABETES MELLITUS WITH HYPERGLYCEMIA, WITHOUT LONG-TERM CURRENT USE OF INSULIN: Primary | ICD-10-CM

## 2022-03-03 DIAGNOSIS — I48.0 PAROXYSMAL ATRIAL FIBRILLATION: ICD-10-CM

## 2022-03-03 PROBLEM — E66.01 MORBID (SEVERE) OBESITY DUE TO EXCESS CALORIES: Status: RESOLVED | Noted: 2021-06-23 | Resolved: 2022-03-03

## 2022-03-03 LAB
ALBUMIN SERPL-MCNC: 4.6 G/DL (ref 3.5–5.2)
ALBUMIN UR-MCNC: 26.3 MG/DL
ALBUMIN/GLOB SERPL: 1.8 G/DL
ALP SERPL-CCNC: 65 U/L (ref 39–117)
ALT SERPL W P-5'-P-CCNC: 22 U/L (ref 1–33)
AMPHET+METHAMPHET UR QL: NEGATIVE
AMPHETAMINES UR QL: NEGATIVE
ANION GAP SERPL CALCULATED.3IONS-SCNC: 12.5 MMOL/L (ref 5–15)
AST SERPL-CCNC: 25 U/L (ref 1–32)
BARBITURATES UR QL SCN: NEGATIVE
BENZODIAZ UR QL SCN: NEGATIVE
BILIRUB SERPL-MCNC: 0.2 MG/DL (ref 0–1.2)
BUN SERPL-MCNC: 16 MG/DL (ref 8–23)
BUN/CREAT SERPL: 16.2 (ref 7–25)
BUPRENORPHINE SERPL-MCNC: NEGATIVE NG/ML
CALCIUM SPEC-SCNC: 10.3 MG/DL (ref 8.6–10.5)
CANNABINOIDS SERPL QL: NEGATIVE
CHLORIDE SERPL-SCNC: 103 MMOL/L (ref 98–107)
CHOLEST SERPL-MCNC: 178 MG/DL (ref 0–200)
CO2 SERPL-SCNC: 22.5 MMOL/L (ref 22–29)
COCAINE UR QL: NEGATIVE
CREAT SERPL-MCNC: 0.99 MG/DL (ref 0.57–1)
CREAT UR-MCNC: 15.2 MG/DL
EGFRCR SERPLBLD CKD-EPI 2021: 57.8 ML/MIN/1.73
EXPIRATION DATE: NORMAL
GLOBULIN UR ELPH-MCNC: 2.5 GM/DL
GLUCOSE SERPL-MCNC: 206 MG/DL (ref 65–99)
HDLC SERPL-MCNC: 50 MG/DL (ref 40–60)
LDLC SERPL CALC-MCNC: 74 MG/DL (ref 0–100)
LDLC/HDLC SERPL: 1.2 {RATIO}
Lab: NORMAL
MDMA UR QL SCN: NEGATIVE
METHADONE UR QL SCN: NEGATIVE
MICROALBUMIN/CREAT UR: 1730.3 MG/G
OPIATES UR QL: NEGATIVE
OXYCODONE UR QL SCN: NEGATIVE
PCP UR QL SCN: NEGATIVE
POTASSIUM SERPL-SCNC: 5 MMOL/L (ref 3.5–5.2)
PROT SERPL-MCNC: 7.1 G/DL (ref 6–8.5)
SODIUM SERPL-SCNC: 138 MMOL/L (ref 136–145)
TRIGL SERPL-MCNC: 341 MG/DL (ref 0–150)
TSH SERPL DL<=0.05 MIU/L-ACNC: 1.45 UIU/ML (ref 0.27–4.2)
VLDLC SERPL-MCNC: 54 MG/DL (ref 5–40)

## 2022-03-03 PROCEDURE — 80061 LIPID PANEL: CPT

## 2022-03-03 PROCEDURE — 82043 UR ALBUMIN QUANTITATIVE: CPT | Performed by: FAMILY MEDICINE

## 2022-03-03 PROCEDURE — 73502 X-RAY EXAM HIP UNI 2-3 VIEWS: CPT

## 2022-03-03 PROCEDURE — 80305 DRUG TEST PRSMV DIR OPT OBS: CPT | Performed by: FAMILY MEDICINE

## 2022-03-03 PROCEDURE — 80053 COMPREHEN METABOLIC PANEL: CPT

## 2022-03-03 PROCEDURE — 82570 ASSAY OF URINE CREATININE: CPT | Performed by: FAMILY MEDICINE

## 2022-03-03 PROCEDURE — 99214 OFFICE O/P EST MOD 30 MIN: CPT | Performed by: FAMILY MEDICINE

## 2022-03-03 PROCEDURE — 36415 COLL VENOUS BLD VENIPUNCTURE: CPT

## 2022-03-03 PROCEDURE — 72100 X-RAY EXAM L-S SPINE 2/3 VWS: CPT

## 2022-03-03 PROCEDURE — 84443 ASSAY THYROID STIM HORMONE: CPT

## 2022-03-03 RX ORDER — AMLODIPINE BESYLATE 5 MG/1
10 TABLET ORAL DAILY
Qty: 90 TABLET | Refills: 1 | Status: SHIPPED | OUTPATIENT
Start: 2022-03-03 | End: 2022-06-06

## 2022-03-03 RX ORDER — HYDROCODONE BITARTRATE AND ACETAMINOPHEN 5; 325 MG/1; MG/1
1 TABLET ORAL DAILY PRN
Qty: 15 TABLET | Refills: 0 | Status: SHIPPED | OUTPATIENT
Start: 2022-03-03 | End: 2022-07-06 | Stop reason: SDUPTHER

## 2022-03-03 NOTE — ASSESSMENT & PLAN NOTE
She has been having to use more Norco lately.  We discussed that we might need to get her into Pain Management.  Checking XRs as below and we will get her into PT.  If she gets no improvement, we will plan to get MRI and send to Pain Management.

## 2022-03-03 NOTE — ASSESSMENT & PLAN NOTE
She is on glimepiride, metformin and insulin NPH for diabetes.  Last A1c was 8.1 back in June.  She did not get her blood drawn after her last visit; labs reordered today.  We will make adjustments to her meds accordingly.  She is on ASA and an ACEI.  She has not been able to tolerate statins. She is UTD on eye exam, last done 2/2022.

## 2022-03-03 NOTE — PROGRESS NOTES
Chief Complaint  Diabetes (4 month follow up )    Subjective          Mary Chopra presents to [facility] today for follow-up on routine issues.    She is on glimepiride, metformin and insulin NPH for diabetes.  Last A1c was 8.1 back in June.  She did not get her blood drawn after her last visit.  She is on ASA and an ACEI.  She has not been able to tolerate statins. She is UTD on eye exam, last done 2/2022.        She is on amlodipine, benazepril, and metoprolol succinate for hypertension.  No chest pain, shortness of air or palpitations.    She is on fenofibrate for elevated triglycerides.      She is on Eliquis for anticoagulation for atrial fibrillation. She is on metoprolol succinate for rate control.  She is on Lasix for pedal edema.  She follows with Cardiology once a year.  She does have pulmonary hypertension, which they are monitoring.      She is on levothyroxine for hypothyroidism.  S no cold/heat intolerance or changes in hair or skin.    She is on Prilosec for GERD.  She denies reflux or heartburn.      She is on montelukast, Flonase and azelastine for allergies.  She has struggled to afford inhalers for her asthma.      She is on allopurinol for gout.  She denies recent flares.      She has a lot of pain from arthritis, particularly in the knees..  She keeps a small supply of hydrocodone/APAP on hand for use when the pain is extremely bad.  She generally uses plain Tylenol as a first-line.  Her pain in the RLE has shifted to the R hip.  Pain seems to start in the R low back pain and radiates down.  It is so painful that she is unable to sit or stand very long.  She is now having to ambulate with a cane.  She          Current Outpatient Medications:   •  allopurinol (ZYLOPRIM) 300 MG tablet, TAKE ONE TABLET BY MOUTH DAILY, Disp: 90 tablet, Rfl: 1  •  amLODIPine (NORVASC) 5 MG tablet, Take 2 tablets by mouth Daily., Disp: 90 tablet, Rfl: 1  •  aspirin 81 MG EC  "tablet, Take 81 mg by mouth Daily., Disp: , Rfl:   •  BD Veo Insulin Syringe U/F 31G X 15/64\" 0.3 ML misc, USE WITH INSULIN THREE TIMES A DAY AND AS NEEDED, Disp: 100 each, Rfl: 5  •  benazepril (LOTENSIN) 20 MG tablet, TAKE ONE TABLET BY MOUTH DAILY, Disp: 90 tablet, Rfl: 1  •  Dextromethorphan-guaiFENesin (MUCINEX DM PO), Take 1 tablet by mouth Daily., Disp: , Rfl:   •  Eliquis 5 MG tablet tablet, TAKE ONE TABLET BY MOUTH TWICE A DAY, Disp: 180 tablet, Rfl: 1  •  fenofibrate (TRICOR) 48 MG tablet, TAKE ONE TABLET BY MOUTH DAILY, Disp: 90 tablet, Rfl: 1  •  fluticasone (FLONASE) 50 MCG/ACT nasal spray, 2 sprays into the nostril(s) as directed by provider Daily., Disp: 9.9 mL, Rfl: 11  •  FREESTYLE LITE test strip, USE TO CHECK BLOOD SUGAR THREE TIMES A DAY, Disp: 300 each, Rfl: 1  •  furosemide (LASIX) 20 MG tablet, TAKE ONE TABLET BY MOUTH DAILY, Disp: 90 tablet, Rfl: 1  •  glimepiride (AMARYL) 4 MG tablet, TAKE ONE TABLET BY MOUTH TWICE A DAY, Disp: 180 tablet, Rfl: 1  •  HumuLIN N 100 UNIT/ML injection, INJECT 8 UNITS UNDER THE SKIN AS DIRECTED TWO TIMES A DAY (Patient taking differently: 10 Units.), Disp: 6 each, Rfl: 5  •  HYDROcodone-acetaminophen (NORCO) 5-325 MG per tablet, Take 1 tablet by mouth Daily As Needed for Moderate Pain ., Disp: 15 tablet, Rfl: 0  •  levothyroxine (SYNTHROID, LEVOTHROID) 75 MCG tablet, TAKE ONE TABLET BY MOUTH DAILY, Disp: 90 tablet, Rfl: 0  •  metFORMIN (GLUCOPHAGE) 500 MG tablet, TAKE TWO TABLETS BY MOUTH EVERY MORNING AND TAKE ONE TABLET BY MOUTH EVERY EVENING, Disp: 270 tablet, Rfl: 1  •  metoprolol succinate XL (TOPROL-XL) 100 MG 24 hr tablet, TAKE ONE TABLET BY MOUTH TWICE A DAY, Disp: 180 tablet, Rfl: 1  •  montelukast (SINGULAIR) 10 MG tablet, TAKE ONE TABLET BY MOUTH DAILY, Disp: 90 tablet, Rfl: 0  •  omeprazole (priLOSEC) 20 MG capsule, Take 20 mg by mouth Daily As Needed., Disp: , Rfl:   •  rosuvastatin (CRESTOR) 5 MG tablet, TAKE ONE TABLET BY MOUTH ONCE NIGHTLY, Disp: " "30 tablet, Rfl: 5    Allergies:  Penicillins, Shellfish-derived products, Avelox [moxifloxacin], Banana, Keflex [cephalexin], Monopril [fosinopril], Serevent [salmeterol], Sulfa antibiotics, Talwin [pentazocine], and Claritin [loratadine]      Objective   Vital Signs:   BP (!) 185/58 (BP Location: Left arm, Patient Position: Sitting)   Pulse 64   Temp 98.3 °F (36.8 °C) (Oral)   Ht 156.2 cm (61.5\")   Wt 93.4 kg (206 lb)   BMI 38.29 kg/m²     Physical Exam  Vitals reviewed.   Constitutional:       General: She is not in acute distress.     Appearance: Normal appearance. She is well-developed.   HENT:      Head: Normocephalic and atraumatic.      Right Ear: External ear normal.      Left Ear: External ear normal.      Nose: Nose normal.      Mouth/Throat:      Mouth: Mucous membranes are moist.   Eyes:      Extraocular Movements: Extraocular movements intact.      Conjunctiva/sclera: Conjunctivae normal.      Pupils: Pupils are equal, round, and reactive to light.   Cardiovascular:      Rate and Rhythm: Normal rate. Rhythm irregularly irregular.      Heart sounds: No murmur heard.      Pulmonary:      Effort: Pulmonary effort is normal.      Breath sounds: Normal breath sounds. No wheezing, rhonchi or rales.   Abdominal:      General: Bowel sounds are normal. There is no distension.      Palpations: Abdomen is soft.      Tenderness: There is no abdominal tenderness.   Musculoskeletal:         General: Normal range of motion.   Neurological:      Mental Status: She is alert.   Psychiatric:         Mood and Affect: Affect normal.             Assessment and Plan    Diagnoses and all orders for this visit:    1. Type 2 diabetes mellitus with hyperglycemia, without long-term current use of insulin ([facility]) (Primary)  Assessment & Plan:  She is on glimepiride, metformin and insulin NPH for diabetes.  Last A1c was 8.1 back in June.  She did not get her blood drawn after her last visit; labs reordered today.  We will make " adjustments to her meds accordingly.  She is on ASA and an ACEI.  She has not been able to tolerate statins. She is UTD on eye exam, last done 2/2022.      Orders:  -     Comprehensive Metabolic Panel; Future  -     Lipid Panel; Future  -     Hemoglobin A1c; Future  -     Microalbumin / Creatinine Urine Ratio - Urine, Clean Catch; Future    2. Essential hypertension  Assessment & Plan:  BP is running significantly above goal.  Increase amlodipine to 10 mg from 5 mg.  Continue benazepril and metoprolol succinate.  Checking labs.    Orders:  -     amLODIPine (NORVASC) 5 MG tablet; Take 2 tablets by mouth Daily.  Dispense: 90 tablet; Refill: 1    3. Mixed hyperlipidemia  Assessment & Plan:  Unable to tolerate statins.  Checking labs.      4. Acquired hypothyroidism  Assessment & Plan:  Stable on levothyroxine.  Checking labs.  No refills needed.    Orders:  -     TSH; Future    5. Paroxysmal atrial fibrillation (HCC)  Assessment & Plan:  Stable on metoprolol for rate control and Eliquis for anticoagulation.  However, she has had a lot of problems affording the Eliquis since January.  We will see if Keri CORCORAN can help her with assistance.    Orders:  -     Ambulatory Referral to Social Care Services (Amb Case Mgmt)    6. Pulmonary hypertension (HCC)  Assessment & Plan:  Stable.  Following with Cardiology.      7. Gastroesophageal reflux disease without esophagitis  Assessment & Plan:  Stable on Prilosec.  No refills needed.      8. Moderate persistent asthma without complication  Assessment & Plan:  Stable on montelukast and nasal sprays.  No refills needed.        9. Chronic right-sided low back pain with right-sided sciatica  Assessment & Plan:  She has been having to use more Norco lately.  We discussed that we might need to get her into Pain Management.  Checking XRs as below and we will get her into PT.  If she gets no improvement, we will plan to get MRI and send to Pain Management.    Orders:  -     XR  Spine Lumbar 2 or 3 View; Future  -     Ambulatory Referral to Physical Therapy Evaluate and treat    10. Right hip pain  Assessment & Plan:  As per back pain plan.    Orders:  -     XR Hip With or Without Pelvis 2 - 3 View Right; Future  -     Ambulatory Referral to Physical Therapy Evaluate and treat    11. Generalized osteoarthritis  Assessment & Plan:  Stable on current regimen.  Symptoms are well controlled.  No adverse effects. She does require ongoing use of this controlled substance to function.  Tox screen was due today.  Prior tox screen appropriate.  Bran was run today.  Refills were needed today.  RTC 4 months.      Orders:  -     HYDROcodone-acetaminophen (NORCO) 5-325 MG per tablet; Take 1 tablet by mouth Daily As Needed for Moderate Pain .  Dispense: 15 tablet; Refill: 0    12. High risk medication use  -     POC Urine Drug Screen [facility]-Cup      Follow Up   Return in about 4 months (around 7/3/2022) for Medicare Wellness.  Patient was given instructions and counseling regarding her condition or for health maintenance advice. Please see specific information pulled into the AVS if appropriate.

## 2022-03-03 NOTE — ASSESSMENT & PLAN NOTE
BP is running significantly above goal.  Increase amlodipine to 10 mg from 5 mg.  Continue benazepril and metoprolol succinate.  Checking labs.

## 2022-03-03 NOTE — ASSESSMENT & PLAN NOTE
Stable on metoprolol for rate control and Eliquis for anticoagulation.  However, she has had a lot of problems affording the Eliquis since January.  We will see if Keri CORCORAN can help her with assistance.

## 2022-03-04 ENCOUNTER — PATIENT OUTREACH (OUTPATIENT)
Dept: CASE MANAGEMENT | Facility: OTHER | Age: 82
End: 2022-03-04

## 2022-03-04 NOTE — OUTREACH NOTE
Patient Outreach    Called Mary to report that the A1c did not get done and she will need to stop by next week to have it done.

## 2022-03-07 ENCOUNTER — PATIENT OUTREACH (OUTPATIENT)
Dept: CASE MANAGEMENT | Facility: OTHER | Age: 82
End: 2022-03-07

## 2022-03-07 DIAGNOSIS — E11.65 TYPE 2 DIABETES MELLITUS WITH HYPERGLYCEMIA, WITHOUT LONG-TERM CURRENT USE OF INSULIN: Primary | ICD-10-CM

## 2022-03-07 PROCEDURE — 83036 HEMOGLOBIN GLYCOSYLATED A1C: CPT | Performed by: FAMILY MEDICINE

## 2022-03-07 PROCEDURE — 3046F HEMOGLOBIN A1C LEVEL >9.0%: CPT | Performed by: FAMILY MEDICINE

## 2022-03-07 NOTE — OUTREACH NOTE
Patient Outreach    Called Mary to review results of x-ray.  Referral was made for PT and discussed with patient and PCP.  Mary would like to go to [facility] due to she had a great experience there after her last surgery.    The lab had difficulty getting her labs last week and the A1c was unable to be drawn.  She is planning on coming back in tomorrow to have done.  Can she just come to allergy room for fingerstick?  If so, will need order for POCT testing.

## 2022-03-08 ENCOUNTER — CLINICAL SUPPORT (OUTPATIENT)
Dept: FAMILY MEDICINE CLINIC | Age: 82
End: 2022-03-08

## 2022-03-08 DIAGNOSIS — J30.9 ALLERGIC RHINITIS, UNSPECIFIED SEASONALITY, UNSPECIFIED TRIGGER: Primary | ICD-10-CM

## 2022-03-08 LAB
EXPIRATION DATE: ABNORMAL
HBA1C MFR BLD: 9.1 %
Lab: ABNORMAL

## 2022-03-08 PROCEDURE — 95117 IMMUNOTHERAPY INJECTIONS: CPT | Performed by: FAMILY MEDICINE

## 2022-03-09 ENCOUNTER — PATIENT OUTREACH (OUTPATIENT)
Dept: CASE MANAGEMENT | Facility: OTHER | Age: 82
End: 2022-03-09

## 2022-03-09 DIAGNOSIS — E11.65 TYPE 2 DIABETES MELLITUS WITH HYPERGLYCEMIA, WITHOUT LONG-TERM CURRENT USE OF INSULIN: ICD-10-CM

## 2022-03-09 RX ORDER — INSULIN HUMAN 100 [IU]/ML
12 INJECTION, SUSPENSION SUBCUTANEOUS
Qty: 10 ML | Refills: 2 | Status: SHIPPED | OUTPATIENT
Start: 2022-03-09

## 2022-03-09 NOTE — OUTREACH NOTE
Patient Outreach    MSW spoke with patient about [employer] Patient Assistance Application. Patient states that she has spent 3% of her income on out of pocket expenses. Patient is going to bring in proof of income and OOP expenses to the office and will sign application. Patient states that she will gather documents and will be in early next week.    Care Coordination    MSW coordinated with Kayla and front staff (Turner) to obtain all signatures. MSW to fax once signatures are obtained.    JEWELL Arango  Ambulatory Case Management    3/9/2022 14:09 EST

## 2022-03-10 ENCOUNTER — PATIENT OUTREACH (OUTPATIENT)
Dept: CASE MANAGEMENT | Facility: OTHER | Age: 82
End: 2022-03-10

## 2022-03-10 NOTE — OUTREACH NOTE
Care Coordination    MSW spoke with Sydnie at office requesting also Insulin assistance. MSW filled out [employer] Patient Assistance Application and sent to Kayla for provider signature. MSW to fax all together once income received early next week.    JEWELL Burrell  Ambulatory Case Management    3/10/2022 11:58 EST

## 2022-03-10 NOTE — OUTREACH NOTE
[facility] Interim Update    Dr. Donohue asked to reach out to patient for the following.     Please call Mary. Unfortunately, her A1c or 3-month average of blood sugars running even higher than it was last time we checked.  She is now above 9.  I think we really need to get her blood sugar back under control or else we are going to be putting her at risk of heart attacks or strokes in addition to other endorgan damage.  I would like her to increase her NPH insulin to 12 units twice daily.  Continue glimepiride and metformin for now.  Thanks, LELE    Spoke with Mary, she states that about a year ago, she lowered her dose to 8 units, but then recently increased back to 10 units.   Inquired whether this was a once a day dosing or twice a day as instructions, she stated twice a day.  Gave her instructions and recommendations to increase to 12 units twice daily.  She was agreeable. She admits that the past few months have been very hard for her and she has been doing a lot of unnecessary eating, lots of carbs, pasta.  She is not sleeping well and this is when she tends to eat almost out of boredom. She is going to work toward improving this.  Advised her that I will call in 2 weeks for a blood pressure log/readings to see if any improvement

## 2022-03-23 ENCOUNTER — PATIENT OUTREACH (OUTPATIENT)
Dept: CASE MANAGEMENT | Facility: OTHER | Age: 82
End: 2022-03-23

## 2022-03-23 ENCOUNTER — CLINICAL SUPPORT (OUTPATIENT)
Dept: FAMILY MEDICINE CLINIC | Age: 82
End: 2022-03-23

## 2022-03-23 DIAGNOSIS — J30.9 ALLERGIC RHINITIS, UNSPECIFIED SEASONALITY, UNSPECIFIED TRIGGER: Primary | ICD-10-CM

## 2022-03-23 PROCEDURE — 95117 IMMUNOTHERAPY INJECTIONS: CPT | Performed by: FAMILY MEDICINE

## 2022-03-23 NOTE — OUTREACH NOTE
Patient Outreach    Patient was in office today to drop off proof of income and also her Out of Pocket expenses from pharmacy and insurance.     Care Coordination    MSW spoke with Turner in front office and she scanned into chart. MSW faxed [employer] application on this day and also [employer] Application on this day. MSW to follow up.    JEWELL Burrell  Ambulatory Case Management    3/23/2022 15:02 EDT

## 2022-03-28 ENCOUNTER — PATIENT OUTREACH (OUTPATIENT)
Dept: CASE MANAGEMENT | Facility: OTHER | Age: 82
End: 2022-03-28

## 2022-03-28 NOTE — OUTREACH NOTE
AMBULATORY CASE MANAGEMENT NOTE    Name and Relationship of Patient/Support Person: Mary Chopra J - Self    Mary states her blood sugar readings are coming down Slowly 150-170 instead of 180-190's.  Can continue to monitor.       CHRISTOFER FORTUNE  Ambulatory Case Management    3/28/2022, 12:22 EDT

## 2022-03-29 ENCOUNTER — PATIENT OUTREACH (OUTPATIENT)
Dept: CASE MANAGEMENT | Facility: OTHER | Age: 82
End: 2022-03-29

## 2022-03-29 NOTE — OUTREACH NOTE
Care Coordination    MSW spoke with Roslyn at [facility] regarding update on patient's assistance application. The formulary was missing on prescriber section. MSW spoke with Kayla and sent corrected section back to [facility] on this day.    Patient Outreach    MSW called and left voicemail for patient informing her that she was approved for Eliquis assistance! Patient still in process with other application for insulin and MSW to follow up in two business days.    JEWELL Burrell  Ambulatory Case Management    3/29/2022 09:25 EDT

## 2022-03-31 ENCOUNTER — PATIENT OUTREACH (OUTPATIENT)
Dept: CASE MANAGEMENT | Facility: OTHER | Age: 82
End: 2022-03-31

## 2022-03-31 NOTE — OUTREACH NOTE
Care Coordination    MSW called [facility] and spoke with Amita. Patient has been enrolled for free insulin assistance until 12/31/22. Patient's insulin will be shipped to her home directly and she will re-apply at the end of the calendar year.    Patient Outreach    MSW called patient and left voicemail with this good news of being approved for Eliquis and also Humulin both. MSW available if needed in the future as all goals are met with her medication costs.    JEWELL Burrell  Ambulatory Case Management    3/31/2022 09:16 EDT

## 2022-04-05 ENCOUNTER — CLINICAL SUPPORT (OUTPATIENT)
Dept: FAMILY MEDICINE CLINIC | Age: 82
End: 2022-04-05

## 2022-04-05 DIAGNOSIS — J30.9 ALLERGIC RHINITIS, UNSPECIFIED SEASONALITY, UNSPECIFIED TRIGGER: ICD-10-CM

## 2022-04-05 PROCEDURE — 95117 IMMUNOTHERAPY INJECTIONS: CPT | Performed by: FAMILY MEDICINE

## 2022-04-08 DIAGNOSIS — E11.65 TYPE 2 DIABETES MELLITUS WITH HYPERGLYCEMIA, WITHOUT LONG-TERM CURRENT USE OF INSULIN: Primary | ICD-10-CM

## 2022-04-08 RX ORDER — BLOOD SUGAR DIAGNOSTIC
1 STRIP MISCELLANEOUS 2 TIMES DAILY WITH MEALS
Qty: 100 EACH | Refills: 1 | Status: SHIPPED | OUTPATIENT
Start: 2022-04-08

## 2022-04-08 RX ORDER — BLOOD SUGAR DIAGNOSTIC
1 STRIP MISCELLANEOUS 2 TIMES DAILY WITH MEALS
COMMUNITY
End: 2022-04-08 | Stop reason: SDUPTHER

## 2022-04-08 NOTE — TELEPHONE ENCOUNTER
Mary called to report that she received her Humulin N this month is a pen and not vial.  She needs a Rx for Pen needles sent in.    She also needs directions on how to use, will stop by office.

## 2022-04-11 ENCOUNTER — CLINICAL SUPPORT (OUTPATIENT)
Dept: FAMILY MEDICINE CLINIC | Age: 82
End: 2022-04-11

## 2022-04-11 DIAGNOSIS — J30.9 ALLERGIC RHINITIS, UNSPECIFIED SEASONALITY, UNSPECIFIED TRIGGER: Primary | ICD-10-CM

## 2022-04-11 PROCEDURE — 95117 IMMUNOTHERAPY INJECTIONS: CPT | Performed by: FAMILY MEDICINE

## 2022-04-14 ENCOUNTER — PATIENT OUTREACH (OUTPATIENT)
Dept: CASE MANAGEMENT | Facility: OTHER | Age: 82
End: 2022-04-14

## 2022-04-14 NOTE — OUTREACH NOTE
AMBULATORY CASE MANAGEMENT NOTE    Name and Relationship of Patient/Support Person: Hiren Chopra J - Self    Called hiren to see if she got her pen needles. She actually got her insulin from [facility] and they do not supply needles.  She did have to pay $60.  Our allergy room helped her with the injection.    Education Documentation  No documentation found.        CHRISTOFER FORTUNE  Ambulatory Case Management    4/14/2022, 15:15 EDT

## 2022-04-22 ENCOUNTER — CLINICAL SUPPORT (OUTPATIENT)
Dept: FAMILY MEDICINE CLINIC | Age: 82
End: 2022-04-22

## 2022-04-22 DIAGNOSIS — J30.9 ALLERGIC RHINITIS, UNSPECIFIED SEASONALITY, UNSPECIFIED TRIGGER: Primary | ICD-10-CM

## 2022-04-22 PROCEDURE — 95117 IMMUNOTHERAPY INJECTIONS: CPT | Performed by: FAMILY MEDICINE

## 2022-05-02 RX ORDER — FENOFIBRATE 48 MG/1
TABLET, COATED ORAL
Qty: 90 TABLET | Refills: 1 | Status: SHIPPED | OUTPATIENT
Start: 2022-05-02 | End: 2022-10-24

## 2022-05-03 ENCOUNTER — CLINICAL SUPPORT (OUTPATIENT)
Dept: FAMILY MEDICINE CLINIC | Age: 82
End: 2022-05-03

## 2022-05-03 DIAGNOSIS — J30.9 ALLERGIC RHINITIS, UNSPECIFIED SEASONALITY, UNSPECIFIED TRIGGER: Primary | ICD-10-CM

## 2022-05-03 PROCEDURE — 95117 IMMUNOTHERAPY INJECTIONS: CPT | Performed by: FAMILY MEDICINE

## 2022-05-09 RX ORDER — LEVOTHYROXINE SODIUM 0.07 MG/1
TABLET ORAL
Qty: 90 TABLET | Refills: 0 | Status: SHIPPED | OUTPATIENT
Start: 2022-05-09 | End: 2022-08-01

## 2022-05-16 RX ORDER — FUROSEMIDE 20 MG/1
TABLET ORAL
Qty: 90 TABLET | Refills: 1 | Status: SHIPPED | OUTPATIENT
Start: 2022-05-16 | End: 2022-11-14

## 2022-05-17 ENCOUNTER — CLINICAL SUPPORT (OUTPATIENT)
Dept: FAMILY MEDICINE CLINIC | Age: 82
End: 2022-05-17

## 2022-05-17 DIAGNOSIS — J30.9 ALLERGIC RHINITIS, UNSPECIFIED SEASONALITY, UNSPECIFIED TRIGGER: Primary | ICD-10-CM

## 2022-05-17 PROCEDURE — 95117 IMMUNOTHERAPY INJECTIONS: CPT | Performed by: FAMILY MEDICINE

## 2022-05-18 ENCOUNTER — PATIENT OUTREACH (OUTPATIENT)
Dept: CASE MANAGEMENT | Facility: OTHER | Age: 82
End: 2022-05-18

## 2022-05-18 DIAGNOSIS — M54.41 CHRONIC RIGHT-SIDED LOW BACK PAIN WITH RIGHT-SIDED SCIATICA: Primary | ICD-10-CM

## 2022-05-18 DIAGNOSIS — G89.29 CHRONIC RIGHT-SIDED LOW BACK PAIN WITH RIGHT-SIDED SCIATICA: Primary | ICD-10-CM

## 2022-05-18 NOTE — OUTREACH NOTE
AMBULATORY CASE MANAGEMENT NOTE    Name and Relationship of Patient/Support Person: NavMary J - Self    Called Mary to follow up on her blood sugar.  She did increase her insulin to 12 units bid.  She states that her blood sugars have no consistency regardless of what she eats.      Finished PT ([facility]), did not help.  Using a hand held massager. What would next steps be?      We discussed that Eliquis is very expensive and she was placed on by cardiology for A-fib.  She was seen last July and to follow up in 1 year but does not have a scheduled appointment.        Education Documentation  No documentation found.      CHRISTOFER FORTUNE  Ambulatory Case Management    5/18/2022, 12:32 EDT

## 2022-05-18 NOTE — PROGRESS NOTES
If PT has not helped, next step is to get an MRI of the back and then we can place a referral to Pain Management.  I have ordered the MRI so she should hear back about that soon.  I know we had asked Keri to look into help for the Eliquis.  Did anything come of that?  Thanks, BZYA

## 2022-05-26 ENCOUNTER — CLINICAL SUPPORT (OUTPATIENT)
Dept: FAMILY MEDICINE CLINIC | Age: 82
End: 2022-05-26

## 2022-05-26 ENCOUNTER — HOSPITAL ENCOUNTER (OUTPATIENT)
Dept: MRI IMAGING | Facility: HOSPITAL | Age: 82
Discharge: HOME | End: 2022-05-26
Admitting: FAMILY MEDICINE

## 2022-05-26 DIAGNOSIS — M54.41 CHRONIC RIGHT-SIDED LOW BACK PAIN WITH RIGHT-SIDED SCIATICA: ICD-10-CM

## 2022-05-26 DIAGNOSIS — J30.9 ALLERGIC RHINITIS, UNSPECIFIED SEASONALITY, UNSPECIFIED TRIGGER: Primary | ICD-10-CM

## 2022-05-26 DIAGNOSIS — G89.29 CHRONIC RIGHT-SIDED LOW BACK PAIN WITH RIGHT-SIDED SCIATICA: ICD-10-CM

## 2022-05-26 PROCEDURE — 72148 MRI LUMBAR SPINE W/O DYE: CPT

## 2022-05-26 PROCEDURE — 95117 IMMUNOTHERAPY INJECTIONS: CPT | Performed by: FAMILY MEDICINE

## 2022-05-31 RX ORDER — MONTELUKAST SODIUM 10 MG/1
TABLET ORAL
Qty: 90 TABLET | Refills: 0 | Status: SHIPPED | OUTPATIENT
Start: 2022-05-31 | End: 2022-08-29

## 2022-06-03 ENCOUNTER — PATIENT OUTREACH (OUTPATIENT)
Dept: CASE MANAGEMENT | Facility: OTHER | Age: 82
End: 2022-06-03

## 2022-06-03 NOTE — OUTREACH NOTE
AMBULATORY CASE MANAGEMENT NOTE    Name and Relationship of Patient/Support Person: Mary Chopra J - Self    Called to discuss results of MRI results.  She did get the letter with intrepretation and she has decided to wait until she sees Dr. Donohue to discuss further.  Explained that sometimes takes weeks to get into specialists, she is not interested in surgical option at this time.  May consider pain management.      Education Documentation  No documentation found.        CHRISTOFER FORTUNE  Ambulatory Case Management    6/3/2022, 12:54 EDT

## 2022-06-05 DIAGNOSIS — I10 ESSENTIAL HYPERTENSION: ICD-10-CM

## 2022-06-06 RX ORDER — AMLODIPINE BESYLATE 5 MG/1
TABLET ORAL
Qty: 90 TABLET | Refills: 1 | Status: SHIPPED | OUTPATIENT
Start: 2022-06-06 | End: 2022-07-06 | Stop reason: SDUPTHER

## 2022-06-08 ENCOUNTER — CLINICAL SUPPORT (OUTPATIENT)
Dept: FAMILY MEDICINE CLINIC | Age: 82
End: 2022-06-08

## 2022-06-08 DIAGNOSIS — J30.9 ALLERGIC RHINITIS, UNSPECIFIED SEASONALITY, UNSPECIFIED TRIGGER: Primary | ICD-10-CM

## 2022-06-08 PROCEDURE — 95117 IMMUNOTHERAPY INJECTIONS: CPT | Performed by: FAMILY MEDICINE

## 2022-06-22 RX ORDER — BLOOD-GLUCOSE METER
KIT MISCELLANEOUS
Qty: 300 EACH | Refills: 0 | Status: SHIPPED | OUTPATIENT
Start: 2022-06-22 | End: 2022-10-18

## 2022-07-06 ENCOUNTER — OFFICE VISIT (OUTPATIENT)
Dept: FAMILY MEDICINE CLINIC | Age: 82
End: 2022-07-06

## 2022-07-06 ENCOUNTER — LAB (OUTPATIENT)
Dept: LAB | Facility: HOSPITAL | Age: 82
End: 2022-07-06

## 2022-07-06 VITALS
HEART RATE: 57 BPM | SYSTOLIC BLOOD PRESSURE: 154 MMHG | WEIGHT: 208 LBS | TEMPERATURE: 98.4 F | DIASTOLIC BLOOD PRESSURE: 53 MMHG | BODY MASS INDEX: 39.27 KG/M2 | HEIGHT: 61 IN

## 2022-07-06 DIAGNOSIS — I48.0 PAROXYSMAL ATRIAL FIBRILLATION: ICD-10-CM

## 2022-07-06 DIAGNOSIS — E03.9 ACQUIRED HYPOTHYROIDISM: ICD-10-CM

## 2022-07-06 DIAGNOSIS — G89.29 CHRONIC RIGHT-SIDED LOW BACK PAIN WITH RIGHT-SIDED SCIATICA: ICD-10-CM

## 2022-07-06 DIAGNOSIS — E11.65 TYPE 2 DIABETES MELLITUS WITH HYPERGLYCEMIA, WITHOUT LONG-TERM CURRENT USE OF INSULIN: ICD-10-CM

## 2022-07-06 DIAGNOSIS — E78.2 MIXED HYPERLIPIDEMIA: ICD-10-CM

## 2022-07-06 DIAGNOSIS — I10 ESSENTIAL HYPERTENSION: ICD-10-CM

## 2022-07-06 DIAGNOSIS — I27.20 PULMONARY HYPERTENSION: ICD-10-CM

## 2022-07-06 DIAGNOSIS — M54.41 CHRONIC RIGHT-SIDED LOW BACK PAIN WITH RIGHT-SIDED SCIATICA: ICD-10-CM

## 2022-07-06 DIAGNOSIS — Z00.00 ANNUAL PHYSICAL EXAM: Primary | ICD-10-CM

## 2022-07-06 DIAGNOSIS — J30.9 ALLERGIC RHINITIS, UNSPECIFIED SEASONALITY, UNSPECIFIED TRIGGER: ICD-10-CM

## 2022-07-06 LAB
ANION GAP SERPL CALCULATED.3IONS-SCNC: 15.9 MMOL/L (ref 5–15)
BUN SERPL-MCNC: 16 MG/DL (ref 8–23)
BUN/CREAT SERPL: 18.2 (ref 7–25)
CALCIUM SPEC-SCNC: 10.2 MG/DL (ref 8.6–10.5)
CHLORIDE SERPL-SCNC: 102 MMOL/L (ref 98–107)
CO2 SERPL-SCNC: 24.1 MMOL/L (ref 22–29)
CREAT SERPL-MCNC: 0.88 MG/DL (ref 0.57–1)
EGFRCR SERPLBLD CKD-EPI 2021: 66.5 ML/MIN/1.73
GLUCOSE SERPL-MCNC: 142 MG/DL (ref 65–99)
HBA1C MFR BLD: 8.6 % (ref 4.8–5.6)
POTASSIUM SERPL-SCNC: 4.4 MMOL/L (ref 3.5–5.2)
SODIUM SERPL-SCNC: 142 MMOL/L (ref 136–145)

## 2022-07-06 PROCEDURE — G0439 PPPS, SUBSEQ VISIT: HCPCS | Performed by: FAMILY MEDICINE

## 2022-07-06 PROCEDURE — 1159F MED LIST DOCD IN RCRD: CPT | Performed by: FAMILY MEDICINE

## 2022-07-06 PROCEDURE — 95117 IMMUNOTHERAPY INJECTIONS: CPT | Performed by: FAMILY MEDICINE

## 2022-07-06 PROCEDURE — 36415 COLL VENOUS BLD VENIPUNCTURE: CPT

## 2022-07-06 PROCEDURE — 83036 HEMOGLOBIN GLYCOSYLATED A1C: CPT

## 2022-07-06 PROCEDURE — 1170F FXNL STATUS ASSESSED: CPT | Performed by: FAMILY MEDICINE

## 2022-07-06 PROCEDURE — 80048 BASIC METABOLIC PNL TOTAL CA: CPT

## 2022-07-06 PROCEDURE — 1125F AMNT PAIN NOTED PAIN PRSNT: CPT | Performed by: FAMILY MEDICINE

## 2022-07-06 RX ORDER — HYDROCODONE BITARTRATE AND ACETAMINOPHEN 5; 325 MG/1; MG/1
1 TABLET ORAL DAILY PRN
Qty: 15 TABLET | Refills: 0 | Status: SHIPPED | OUTPATIENT
Start: 2022-07-06 | End: 2022-11-08 | Stop reason: SDUPTHER

## 2022-07-06 RX ORDER — LEVOCETIRIZINE DIHYDROCHLORIDE 5 MG/1
5 TABLET, FILM COATED ORAL EVERY EVENING
COMMUNITY

## 2022-07-06 RX ORDER — AMLODIPINE BESYLATE 10 MG/1
10 TABLET ORAL DAILY
Qty: 90 TABLET | Refills: 1 | Status: SHIPPED | OUTPATIENT
Start: 2022-07-06 | End: 2023-01-17

## 2022-07-06 RX ORDER — METOPROLOL SUCCINATE 100 MG/1
100 TABLET, EXTENDED RELEASE ORAL DAILY
Qty: 90 TABLET | Refills: 1 | Status: SHIPPED | OUTPATIENT
Start: 2022-07-06 | End: 2022-08-26 | Stop reason: SDUPTHER

## 2022-07-06 NOTE — PROGRESS NOTES
"The ABCs of the Annual Wellness Visit  Subsequent Medicare Wellness Visit    Chief Complaint   Patient presents with   • Medicare Wellness-subsequent      Subjective    History of Present Illness:  Mary Chopra is a 81 y.o. female who presents for a Subsequent Medicare Wellness Visit.    Colonoscopy is no longer indicated by age and history. Pap smear is no longer indicated by age and history.  Mammogram is no longer to get by age and history. She is up-to-date on DEXA, last done 6/2021 and this was normal.  She is UTD on COVID (x2, due for shot #3), Pneumovax (2/2008), Prevnar (9/2016), Zostavax (9/2012), and flu (11/2021).  She is due for Shingrix and Td (2008).  She is due for routine labs including diabetes panel.     She is on glimepiride, metformin and insulin NPH for diabetes.  Last A1c was 9.1 in March.   She is on statin, aspirin and an ACEI.  She is UTD on eye exam, last done 2/2022.        She is on benazepril,  amlodipine and metoprolol succinate for hypertension.  No chest pain, shortness of air or palpitations.     She is on rosuvastatin and fenofibrate for hyperlipidemia.      She is on Eliquis for anticoagulation for atrial fibrillation. She is on metoprolol succinate for rate control.  She is on Lasix for pedal edema.  She follows with Cardiology.  She does have pulmonary hypertension, which they are monitoring.  She has been having some bilateral pedal edema.  She had some shortness of breath when the pedal edema was the worst.  That is all doing better at the moment.  She is trying to cut back on salt \"but I've reading labels and I've been so surprised at how much salt is in food.\"      She is on levothyroxine for hypothyroidism.  No problems with cold/heat intolerance or changes in hair or skin.     She is on  omeprazole for GERD.  No heartburn or indigestion.      She is on montelukast, Flonase and azelastine for allergies.  She has struggled to afford inhalers for her asthma.      She is " "on allopurinol for gout.  No recent flares.      Her knees are still bothering her.  Uses Tylenol generally although every once in a while, she uses a Norco from her small supply that she keeps on hand.  Last tox screen 3/2022.  Ambulates with a cane if she is going very far.  She has tried PT but did not get any benefit.  She had MRI L spine done back in May.      The following portions of the patient's history were reviewed and   updated as appropriate: allergies, current medications, past family history, past medical history, past social history, past surgical history and problem list.    Compared to one year ago, the patient feels her physical   health is worse.    Compared to one year ago, the patient feels her mental   health is worse.    Recent Hospitalizations:  She was not admitted to the hospital during the last year.       Current Medical Providers:  Patient Care Team:  Sakina Donohue MD as PCP - General (Family Medicine)  Maegan Banks RN as Ambulatory  (Population Health)  Samir Rossi MD as Consulting Physician (Cardiology)  Linda Baumann MD as Consulting Physician (Dermatology)  Mg Cowan OD as Consulting Physician (Optometry)  Peewee Pierre MD as Consulting Physician (Allergy and Immunology)    Outpatient Medications Prior to Visit   Medication Sig Dispense Refill   • allopurinol (ZYLOPRIM) 300 MG tablet TAKE ONE TABLET BY MOUTH DAILY 90 tablet 1   • aspirin 81 MG EC tablet Take 81 mg by mouth Daily.     • BD Veo Insulin Syringe U/F 31G X 15/64\" 0.3 ML misc USE WITH INSULIN THREE TIMES A DAY AND AS NEEDED 100 each 5   • benazepril (LOTENSIN) 20 MG tablet TAKE ONE TABLET BY MOUTH DAILY 90 tablet 1   • Dextromethorphan-guaiFENesin (MUCINEX DM PO) Take 1 tablet by mouth Daily.     • Eliquis 5 MG tablet tablet TAKE ONE TABLET BY MOUTH TWICE A  tablet 1   • fenofibrate (TRICOR) 48 MG tablet TAKE ONE TABLET BY MOUTH DAILY 90 tablet 1   • fluticasone " (FLONASE) 50 MCG/ACT nasal spray 2 sprays into the nostril(s) as directed by provider Daily. 9.9 mL 11   • FREESTYLE LITE test strip USE TO CHECK BLOOD SUGAR THREE TIMES A  each 0   • furosemide (LASIX) 20 MG tablet TAKE ONE TABLET BY MOUTH DAILY 90 tablet 1   • glimepiride (AMARYL) 4 MG tablet TAKE ONE TABLET BY MOUTH TWICE A  tablet 1   • insulin NPH (HumuLIN N) 100 UNIT/ML injection Inject 12 Units under the skin into the appropriate area as directed 2 (Two) Times a Day Before Meals. 10 mL 2   • Insulin Pen Needle (Pen Needles) 32G X 6 MM misc 1 each 2 (Two) Times a Day With Meals. Dx:  E11.9 100 each 1   • levocetirizine (XYZAL) 5 MG tablet Take 5 mg by mouth Every Evening.     • levothyroxine (SYNTHROID, LEVOTHROID) 75 MCG tablet TAKE ONE TABLET BY MOUTH DAILY 90 tablet 0   • montelukast (SINGULAIR) 10 MG tablet TAKE ONE TABLET BY MOUTH DAILY 90 tablet 0   • omeprazole (priLOSEC) 20 MG capsule Take 20 mg by mouth Daily As Needed.     • rosuvastatin (CRESTOR) 5 MG tablet TAKE ONE TABLET BY MOUTH ONCE NIGHTLY 30 tablet 5   • amLODIPine (NORVASC) 5 MG tablet TAKE TWO TABLETS BY MOUTH DAILY 90 tablet 1   • HYDROcodone-acetaminophen (NORCO) 5-325 MG per tablet Take 1 tablet by mouth Daily As Needed for Moderate Pain . 15 tablet 0   • metFORMIN (GLUCOPHAGE) 500 MG tablet TAKE TWO TABLETS BY MOUTH EVERY MORNING AND TAKE ONE TABLET BY MOUTH EVERY EVENING 270 tablet 1   • metoprolol succinate XL (TOPROL-XL) 100 MG 24 hr tablet TAKE ONE TABLET BY MOUTH TWICE A DAY (Patient taking differently: Take 100 mg by mouth Daily.) 180 tablet 1     No facility-administered medications prior to visit.       Opioid medication/s are on active medication list.  and I have evaluated her active treatment plan and pain score trends (see table).  Vitals:    07/06/22 1248   PainSc:   5   PainLoc: Leg     I have reviewed the chart for potential of high risk medication and harmful drug interactions in the  elderly.            Aspirin is on active medication list. Aspirin use is indicated based on review of current medical condition/s. Pros and cons of this therapy have been discussed today. Benefits of this medication outweigh potential harm.  Patient has been encouraged to continue taking this medication.  .      Patient Active Problem List   Diagnosis   • Anxiety   • Generalized osteoarthritis   • Moderate persistent asthma without complication   • Gastroesophageal reflux disease without esophagitis   • Essential hypertension   • Mixed hyperlipidemia   • Acquired hypothyroidism   • Type 2 diabetes mellitus with hyperglycemia, without long-term current use of insulin (HCC)   • Paroxysmal atrial fibrillation (HCC)   • Annual physical exam   • Pulmonary hypertension (HCC)   • Chronic pain of both knees   • High risk medication use   • Right hip pain   • Chronic right-sided low back pain with right-sided sciatica     Advance Care Planning  Advance Directive is not on file.  ACP discussion was held with the patient during this visit. Patient has an advance directive (not in EMR), copy requested.    Review of Systems   Constitutional: Positive for fatigue. Negative for chills and fever.   HENT: Negative for congestion, hearing loss and rhinorrhea.    Eyes: Negative for pain and visual disturbance.   Respiratory: Negative for cough and shortness of breath.    Cardiovascular: Positive for leg swelling (bilateral ankles). Negative for chest pain and palpitations.   Gastrointestinal: Negative for abdominal pain, constipation, diarrhea, nausea and vomiting.   Genitourinary: Negative for difficulty urinating and dysuria.   Musculoskeletal: Positive for arthralgias and back pain. Negative for myalgias.   Neurological: Negative for weakness and numbness.   Psychiatric/Behavioral: Negative for dysphoric mood and sleep disturbance. The patient is not nervous/anxious.         Objective    Vitals:    07/06/22 1248 07/06/22 1318   BP:  "176/56 154/53   BP Location: Left arm Left arm   Patient Position: Sitting Sitting   Cuff Size:  Large Adult   Pulse: 62 57   Temp: 98.4 °F (36.9 °C)    TempSrc: Oral    Weight: 94.3 kg (208 lb)    Height: 156.2 cm (61.5\")    PainSc:   5    PainLoc: Leg      Estimated body mass index is 38.67 kg/m² as calculated from the following:    Height as of this encounter: 156.2 cm (61.5\").    Weight as of this encounter: 94.3 kg (208 lb).    Class 2 Severe Obesity (BMI >=35 and <=39.9). Obesity-related health conditions include the following: hypertension, diabetes mellitus and dyslipidemias. Obesity is unchanged. BMI is is above average; BMI management plan is completed. We discussed portion control and increasing exercise.      Does the patient have evidence of cognitive impairment? No    Physical Exam  Vitals reviewed.   Constitutional:       General: She is not in acute distress.     Appearance: Normal appearance. She is well-developed.   HENT:      Head: Normocephalic and atraumatic.      Right Ear: External ear normal.      Left Ear: External ear normal.      Mouth/Throat:      Mouth: Mucous membranes are moist.   Eyes:      Extraocular Movements: Extraocular movements intact.      Conjunctiva/sclera: Conjunctivae normal.      Pupils: Pupils are equal, round, and reactive to light.   Cardiovascular:      Rate and Rhythm: Normal rate and regular rhythm.      Heart sounds: No murmur heard.  Pulmonary:      Effort: Pulmonary effort is normal.      Breath sounds: Normal breath sounds. No wheezing, rhonchi or rales.   Abdominal:      General: Bowel sounds are normal. There is no distension.      Palpations: Abdomen is soft.      Tenderness: There is no abdominal tenderness.   Musculoskeletal:         General: Normal range of motion.   Skin:     General: Skin is warm and dry.   Neurological:      Mental Status: She is alert and oriented to person, place, and time.      Deep Tendon Reflexes: Reflexes normal.   Psychiatric:    "      Mood and Affect: Mood and affect normal.         Behavior: Behavior normal.         Thought Content: Thought content normal.         Judgment: Judgment normal.       Lab Results   Component Value Date    HGBA1C 8.60 (H) 2022            HEALTH RISK ASSESSMENT    Smoking Status:  Social History     Tobacco Use   Smoking Status Former Smoker   • Types: Cigarettes   • Quit date:    • Years since quittin.5   Smokeless Tobacco Never Used     Alcohol Consumption:  Social History     Substance and Sexual Activity   Alcohol Use No     Fall Risk Screen:    STEADI Fall Risk Assessment was completed, and patient is at LOW risk for falls.Assessment completed on:3/3/2022    Depression Screening:  PHQ-2/PHQ-9 Depression Screening 3/3/2022   Retired PHQ-9 Total Score 0   Retired Total Score 0       Health Habits and Functional and Cognitive Screening:  Functional & Cognitive Status 2022   Do you have difficulty preparing food and eating? No   Do you have difficulty bathing yourself, getting dressed or grooming yourself? No   Do you have difficulty using the toilet? No   Do you have difficulty moving around from place to place? No   Do you have trouble with steps or getting out of a bed or a chair? Yes   Current Diet Well Balanced Diet   Dental Exam Not up to date   Eye Exam Up to date   Exercise (times per week) 0 times per week   Current Exercises Include No Regular Exercise   Do you need help using the phone?  No   Are you deaf or do you have serious difficulty hearing?  No   Do you need help with transportation? No   Do you need help shopping? No   Do you need help preparing meals?  No   Do you need help with housework?  No   Do you need help with laundry? No   Do you need help taking your medications? No   Do you need help managing money? No   Do you ever drive or ride in a car without wearing a seat belt? No   Have you felt unusual stress, anger or loneliness in the last month? No   Who do you live  with? Alone   If you need help, do you have trouble finding someone available to you? No   Have you been bothered in the last four weeks by sexual problems? No   Do you have difficulty concentrating, remembering or making decisions? Yes       Age-appropriate Screening Schedule:  Refer to the list below for future screening recommendations based on patient's age, sex and/or medical conditions. Orders for these recommended tests are listed in the plan section. The patient has been provided with a written plan.    Health Maintenance   Topic Date Due   • ZOSTER VACCINE (1 of 2) 10/27/2012   • TDAP/TD VACCINES (2 - Tdap) 07/01/2018   • INFLUENZA VACCINE  10/01/2022   • HEMOGLOBIN A1C  01/06/2023   • DIABETIC EYE EXAM  02/23/2023   • LIPID PANEL  03/03/2023   • URINE MICROALBUMIN  03/03/2023   • DXA SCAN  06/23/2023              Assessment & Plan   CMS Preventative Services Quick Reference  Risk Factors Identified During Encounter  Immunizations Discussed/Encouraged (specific Immunizations; Tdap, Shingrix and COVID19  The above risks/problems have been discussed with the patient.  Follow up actions/plans if indicated are seen below in the Assessment/Plan Section.  Pertinent information has been shared with the patient in the After Visit Summary.    Diagnoses and all orders for this visit:    1. Annual physical exam (Primary)  Assessment & Plan:  Colonoscopy is no longer indicated by age and history. Pap smear is no longer indicated by age and history.  Mammogram is no longer to get by age and history. She is up-to-date on DEXA, last done 6/2021 and this was normal.  She is UTD on COVID (x2, due for shot #3 - declined), Pneumovax (2/2008), Prevnar (9/2016), Zostavax (9/2012), and flu (11/2021).  She is due for Shingrix and Td (2008).  She is due for routine labs including diabetes panel; ordered.      2. Type 2 diabetes mellitus with hyperglycemia, without long-term current use of insulin (HCC)  Assessment & Plan:  She is  on glimepiride, metformin and insulin NPH for diabetes.  Last A1c was 9.1 in March.  Rechecking labs today.  She is on statin, aspirin and an ACEI.  She is UTD on eye exam, last done 2/2022.      Orders:  -     Basic Metabolic Panel; Future  -     Hemoglobin A1c; Future    3. Essential hypertension  Assessment & Plan:  BP is at goal at home.  Refills sent.  Checking labs.  She continues to have some problems with ankle swelling at home which she attributes to excessive salt intake.  We did discuss this today and set some goals to decrease the amount of salt that she is taking in.  Discussed reading labels, which she has been trying to do more, and she has already noticed somewhat of an improvement in the pedal edema.    Orders:  -     amLODIPine (NORVASC) 10 MG tablet; Take 1 tablet by mouth Daily.  Dispense: 90 tablet; Refill: 1  -     metoprolol succinate XL (TOPROL-XL) 100 MG 24 hr tablet; Take 1 tablet by mouth Daily.  Dispense: 90 tablet; Refill: 1    4. Chronic right-sided low back pain with right-sided sciatica  Assessment & Plan:  Stable on current regimen.  Symptoms are well controlled.  No adverse effects. She does require ongoing use of this controlled substance to function.  Tox screen was not due today.  Prior tox screen appropriate.  JULIANNE was run today.  Refills were needed today.  RTC 4 months.      Orders:  -     HYDROcodone-acetaminophen (NORCO) 5-325 MG per tablet; Take 1 tablet by mouth Daily As Needed for Moderate Pain .  Dispense: 15 tablet; Refill: 0    5. Mixed hyperlipidemia  Assessment & Plan:  Stable on rosuvastatin 5 mg daily.  No refills needed.  Labs are reviewed and UTD.      6. Acquired hypothyroidism  Assessment & Plan:  Stable on levothyroxine 75 mcg daily.  No refills needed.  Labs are reviewed and UTD.      7. Paroxysmal atrial fibrillation (HCC)  Assessment & Plan:  Stable on Eliquis and metoprolol succinate.  No refills needed.  Checking labs.  Following with  Cardiology.      8. Pulmonary hypertension (HCC)  Assessment & Plan:  Following with Cardiology.      9. Allergic rhinitis, unspecified seasonality, unspecified trigger  -     Allergy Serum Injection  -     Allergy Serum Injection      Follow Up:   Return in about 4 months (around 11/6/2022) for Recheck.     An After Visit Summary and PPPS were made available to the patient.

## 2022-07-06 NOTE — ASSESSMENT & PLAN NOTE
Stable on Eliquis and metoprolol succinate.  No refills needed.  Checking labs.  Following with Cardiology.

## 2022-07-06 NOTE — ASSESSMENT & PLAN NOTE
She is on glimepiride, metformin and insulin NPH for diabetes.  Last A1c was 9.1 in March.  Rechecking labs today.  She is on statin, aspirin and an ACEI.  She is UTD on eye exam, last done 2/2022.

## 2022-07-06 NOTE — ASSESSMENT & PLAN NOTE
BP is at goal at home.  Refills sent.  Checking labs.  She continues to have some problems with ankle swelling at home which she attributes to excessive salt intake.  We did discuss this today and set some goals to decrease the amount of salt that she is taking in.  Discussed reading labels, which she has been trying to do more, and she has already noticed somewhat of an improvement in the pedal edema.

## 2022-07-06 NOTE — ASSESSMENT & PLAN NOTE
Colonoscopy is no longer indicated by age and history. Pap smear is no longer indicated by age and history.  Mammogram is no longer to get by age and history. She is up-to-date on DEXA, last done 6/2021 and this was normal.  She is UTD on COVID (x2, due for shot #3 - declined), Pneumovax (2/2008), Prevnar (9/2016), Zostavax (9/2012), and flu (11/2021).  She is due for Shingrix and Td (2008).  She is due for routine labs including diabetes panel; ordered.

## 2022-07-06 NOTE — ASSESSMENT & PLAN NOTE
Stable on current regimen.  Symptoms are well controlled.  No adverse effects. She does require ongoing use of this controlled substance to function.  Tox screen was not due today.  Prior tox screen appropriate.  JULIANNE was run today.  Refills were needed today.  RTC 4 months.

## 2022-07-11 ENCOUNTER — PATIENT OUTREACH (OUTPATIENT)
Dept: CASE MANAGEMENT | Facility: OTHER | Age: 82
End: 2022-07-11

## 2022-07-11 NOTE — OUTREACH NOTE
AMBULATORY CASE MANAGEMENT NOTE    Name and Relationship of Patient/Support Person: Mary Chopra J - Self    Called and left a voice message with Mary and mailed copy of labs to patient.  A1c improved slightly - great news.     Education Documentation  No documentation found.        CHRISTOFER FORTUNE  Ambulatory Case Management    7/11/2022, 16:21 EDT

## 2022-07-21 ENCOUNTER — CLINICAL SUPPORT (OUTPATIENT)
Dept: FAMILY MEDICINE CLINIC | Age: 82
End: 2022-07-21

## 2022-07-21 DIAGNOSIS — Z00.00 ANNUAL PHYSICAL EXAM: Primary | ICD-10-CM

## 2022-07-21 DIAGNOSIS — J30.9 ALLERGIC RHINITIS, UNSPECIFIED SEASONALITY, UNSPECIFIED TRIGGER: ICD-10-CM

## 2022-07-21 PROCEDURE — 95117 IMMUNOTHERAPY INJECTIONS: CPT | Performed by: FAMILY MEDICINE

## 2022-08-01 RX ORDER — ALLOPURINOL 300 MG/1
TABLET ORAL
Qty: 90 TABLET | Refills: 1 | Status: SHIPPED | OUTPATIENT
Start: 2022-08-01 | End: 2023-01-24

## 2022-08-01 RX ORDER — LEVOTHYROXINE SODIUM 0.07 MG/1
TABLET ORAL
Qty: 90 TABLET | Refills: 0 | Status: SHIPPED | OUTPATIENT
Start: 2022-08-01 | End: 2022-11-08

## 2022-08-01 RX ORDER — ROSUVASTATIN CALCIUM 5 MG/1
TABLET, COATED ORAL
Qty: 30 TABLET | Refills: 5 | Status: SHIPPED | OUTPATIENT
Start: 2022-08-01 | End: 2023-01-30

## 2022-08-01 RX ORDER — BENAZEPRIL HYDROCHLORIDE 20 MG/1
TABLET ORAL
Qty: 90 TABLET | Refills: 1 | Status: SHIPPED | OUTPATIENT
Start: 2022-08-01 | End: 2023-01-24

## 2022-08-08 RX ORDER — GLIMEPIRIDE 4 MG/1
TABLET ORAL
Qty: 180 TABLET | Refills: 1 | Status: SHIPPED | OUTPATIENT
Start: 2022-08-08 | End: 2023-02-06

## 2022-08-17 ENCOUNTER — PATIENT OUTREACH (OUTPATIENT)
Dept: CASE MANAGEMENT | Facility: OTHER | Age: 82
End: 2022-08-17

## 2022-08-17 NOTE — OUTREACH NOTE
AMBULATORY CASE MANAGEMENT NOTE    Name and Relationship of Patient/Support Person: NavTamiaby J - Self    Called to check in.  Mary states that :  1.  She has not been feeling well for the past few days, sinus pressure, headache, shortness of air.  Informed her that she has symptoms of covid.  Once pressed, she says that it has been going on for 1 week or more.  She is past the time of being treated with Paxlovid.  Her daughter tested positive also, she most likely has but declines testing.     2.  Also she is reporting that her blood sugar in the middle of night in the 50's.  She did increase her insulin to 15 units BID - NPH.  Possibly reduce the nighttime dose (she administers at 9-10pm) back to 12 units and keep am to 15 units. She reports that she has only had 1 day of 200+ readings.       CHRISTOFER FORTUNE  Ambulatory Case Management    8/17/2022, 14:43 EDT

## 2022-08-17 NOTE — PROGRESS NOTES
-Agree that she likely has COVID.  Even though she is outside the window for treatment with Paxlovid, we can get her in for an evaluation to make sure she is not decompensating if she thinks this would be beneficial for her.  -Agree that she should decrease the nighttime insulin dose to 12 units and continue 15 units QAM.  Please continue to monitor closely.   Thanks, BZYA

## 2022-08-17 NOTE — PROGRESS NOTES
She did a home test, negative.  She states that if she is not feeling any better by Monday, she will call for appointment.  She feels like it is just a sinus.early allergy season for her.  Also she will report any low blood sugar readings, discussed strategy to be able to check blood sugar and recover in middle of night to prevent fall.

## 2022-08-18 ENCOUNTER — TELEPHONE (OUTPATIENT)
Dept: CASE MANAGEMENT | Facility: OTHER | Age: 82
End: 2022-08-18

## 2022-08-18 NOTE — TELEPHONE ENCOUNTER
Called to report.  Developed bad toothache, will need to go to dental surgeon, she wanted to know how long she should be off her blood thinner before procedure (if warranted).

## 2022-08-18 NOTE — TELEPHONE ENCOUNTER
If they are going to have to operate, she should hold warfarin for 5 days prior to procedure.  She is on anticoagulation for atrial fib.  No bridge needed.  Thanks, LELE

## 2022-08-21 DIAGNOSIS — I10 ESSENTIAL HYPERTENSION: ICD-10-CM

## 2022-08-24 ENCOUNTER — TELEPHONE (OUTPATIENT)
Dept: CASE MANAGEMENT | Facility: OTHER | Age: 82
End: 2022-08-24

## 2022-08-24 DIAGNOSIS — I10 ESSENTIAL HYPERTENSION: ICD-10-CM

## 2022-08-24 RX ORDER — METOPROLOL SUCCINATE 100 MG/1
TABLET, EXTENDED RELEASE ORAL
Qty: 180 TABLET | OUTPATIENT
Start: 2022-08-24

## 2022-08-24 NOTE — TELEPHONE ENCOUNTER
Mary called to state that the prescription for the Metoprolol is wrong.  She states the metoprolol 100mg should be for bid and not Qd.  If agreeable, can you send in new Rx for 100 bid.  - Leanna

## 2022-08-25 ENCOUNTER — CLINICAL SUPPORT (OUTPATIENT)
Dept: FAMILY MEDICINE CLINIC | Age: 82
End: 2022-08-25

## 2022-08-25 DIAGNOSIS — J30.9 ALLERGIC RHINITIS, UNSPECIFIED SEASONALITY, UNSPECIFIED TRIGGER: Primary | ICD-10-CM

## 2022-08-25 PROCEDURE — 95117 IMMUNOTHERAPY INJECTIONS: CPT | Performed by: FAMILY MEDICINE

## 2022-08-26 RX ORDER — METOPROLOL SUCCINATE 100 MG/1
100 TABLET, EXTENDED RELEASE ORAL 2 TIMES DAILY
Qty: 180 TABLET | Refills: 1 | Status: SHIPPED | OUTPATIENT
Start: 2022-08-26 | End: 2023-02-28

## 2022-08-29 RX ORDER — MONTELUKAST SODIUM 10 MG/1
TABLET ORAL
Qty: 90 TABLET | Refills: 0 | Status: SHIPPED | OUTPATIENT
Start: 2022-08-29 | End: 2022-11-28

## 2022-09-06 ENCOUNTER — CLINICAL SUPPORT (OUTPATIENT)
Dept: FAMILY MEDICINE CLINIC | Age: 82
End: 2022-09-06

## 2022-09-06 DIAGNOSIS — J30.9 ALLERGIC RHINITIS, UNSPECIFIED SEASONALITY, UNSPECIFIED TRIGGER: Primary | ICD-10-CM

## 2022-09-06 PROCEDURE — 95117 IMMUNOTHERAPY INJECTIONS: CPT | Performed by: FAMILY MEDICINE

## 2022-09-16 ENCOUNTER — CLINICAL SUPPORT (OUTPATIENT)
Dept: FAMILY MEDICINE CLINIC | Age: 82
End: 2022-09-16

## 2022-09-16 DIAGNOSIS — J30.9 ALLERGIC RHINITIS, UNSPECIFIED SEASONALITY, UNSPECIFIED TRIGGER: Primary | ICD-10-CM

## 2022-09-16 PROCEDURE — 95117 IMMUNOTHERAPY INJECTIONS: CPT | Performed by: FAMILY MEDICINE

## 2022-09-26 ENCOUNTER — CLINICAL SUPPORT (OUTPATIENT)
Dept: FAMILY MEDICINE CLINIC | Age: 82
End: 2022-09-26

## 2022-09-26 VITALS — SYSTOLIC BLOOD PRESSURE: 186 MMHG | DIASTOLIC BLOOD PRESSURE: 71 MMHG | HEART RATE: 69 BPM

## 2022-09-26 DIAGNOSIS — J30.9 ALLERGIC RHINITIS, UNSPECIFIED SEASONALITY, UNSPECIFIED TRIGGER: Primary | ICD-10-CM

## 2022-09-26 PROCEDURE — 95117 IMMUNOTHERAPY INJECTIONS: CPT | Performed by: FAMILY MEDICINE

## 2022-10-05 ENCOUNTER — CLINICAL SUPPORT (OUTPATIENT)
Dept: FAMILY MEDICINE CLINIC | Age: 82
End: 2022-10-05

## 2022-10-05 DIAGNOSIS — J30.9 ALLERGIC RHINITIS, UNSPECIFIED SEASONALITY, UNSPECIFIED TRIGGER: Primary | ICD-10-CM

## 2022-10-05 PROCEDURE — 95117 IMMUNOTHERAPY INJECTIONS: CPT | Performed by: FAMILY MEDICINE

## 2022-10-14 ENCOUNTER — CLINICAL SUPPORT (OUTPATIENT)
Dept: FAMILY MEDICINE CLINIC | Age: 82
End: 2022-10-14

## 2022-10-14 VITALS — HEART RATE: 65 BPM | DIASTOLIC BLOOD PRESSURE: 60 MMHG | SYSTOLIC BLOOD PRESSURE: 171 MMHG

## 2022-10-14 DIAGNOSIS — J30.9 ALLERGIC RHINITIS, UNSPECIFIED SEASONALITY, UNSPECIFIED TRIGGER: Primary | ICD-10-CM

## 2022-10-14 PROCEDURE — 95117 IMMUNOTHERAPY INJECTIONS: CPT | Performed by: FAMILY MEDICINE

## 2022-10-18 RX ORDER — BLOOD-GLUCOSE METER
KIT MISCELLANEOUS
Qty: 300 EACH | Refills: 0 | Status: SHIPPED | OUTPATIENT
Start: 2022-10-18 | End: 2023-02-06

## 2022-10-21 ENCOUNTER — CLINICAL SUPPORT (OUTPATIENT)
Dept: FAMILY MEDICINE CLINIC | Age: 82
End: 2022-10-21

## 2022-10-21 VITALS — DIASTOLIC BLOOD PRESSURE: 61 MMHG | HEART RATE: 65 BPM | SYSTOLIC BLOOD PRESSURE: 167 MMHG

## 2022-10-21 DIAGNOSIS — J30.9 ALLERGIC RHINITIS, UNSPECIFIED SEASONALITY, UNSPECIFIED TRIGGER: Primary | ICD-10-CM

## 2022-10-21 PROCEDURE — 95117 IMMUNOTHERAPY INJECTIONS: CPT | Performed by: FAMILY MEDICINE

## 2022-10-24 RX ORDER — FENOFIBRATE 48 MG/1
TABLET, COATED ORAL
Qty: 90 TABLET | Refills: 1 | Status: SHIPPED | OUTPATIENT
Start: 2022-10-24

## 2022-10-27 ENCOUNTER — CLINICAL SUPPORT (OUTPATIENT)
Dept: FAMILY MEDICINE CLINIC | Age: 82
End: 2022-10-27

## 2022-10-27 VITALS — HEART RATE: 79 BPM | SYSTOLIC BLOOD PRESSURE: 171 MMHG | DIASTOLIC BLOOD PRESSURE: 68 MMHG

## 2022-10-27 DIAGNOSIS — J30.9 ALLERGIC RHINITIS, UNSPECIFIED SEASONALITY, UNSPECIFIED TRIGGER: Primary | ICD-10-CM

## 2022-10-27 PROCEDURE — 95117 IMMUNOTHERAPY INJECTIONS: CPT | Performed by: FAMILY MEDICINE

## 2022-11-03 ENCOUNTER — PATIENT OUTREACH (OUTPATIENT)
Dept: CASE MANAGEMENT | Facility: OTHER | Age: 82
End: 2022-11-03

## 2022-11-03 NOTE — OUTREACH NOTE
AMBULATORY CASE MANAGEMENT NOTE    Name and Relationship of Patient/Support Person: Nav Mary J - Self    Called Mary to follow up on her acute care visit.  Her driveway was covered by leaves and she missed the edge of the driveway and fell forward, hitting knee hard and wrists.  Reviewed note in Care Everywhere.  No fractures.  Small hematoma.  Mary reports that she is feeling less sore than she expected. She incidentally has an appointment next week.  She reports having an intense pain in her knees.  She does not have a orthopedic doc, may need referral.       CHRISTOFER FORTUNE  Ambulatory Case Management    11/3/2022, 10:12 EDT

## 2022-11-08 ENCOUNTER — OFFICE VISIT (OUTPATIENT)
Dept: FAMILY MEDICINE CLINIC | Age: 82
End: 2022-11-08

## 2022-11-08 VITALS
HEIGHT: 62 IN | TEMPERATURE: 98 F | SYSTOLIC BLOOD PRESSURE: 164 MMHG | HEART RATE: 63 BPM | WEIGHT: 207 LBS | BODY MASS INDEX: 38.09 KG/M2 | DIASTOLIC BLOOD PRESSURE: 40 MMHG

## 2022-11-08 DIAGNOSIS — E11.65 TYPE 2 DIABETES MELLITUS WITH HYPERGLYCEMIA, WITHOUT LONG-TERM CURRENT USE OF INSULIN: ICD-10-CM

## 2022-11-08 DIAGNOSIS — S80.11XA HEMATOMA OF RIGHT LOWER LEG: ICD-10-CM

## 2022-11-08 DIAGNOSIS — J42 CHRONIC BRONCHITIS, UNSPECIFIED CHRONIC BRONCHITIS TYPE: ICD-10-CM

## 2022-11-08 DIAGNOSIS — Z23 ENCOUNTER FOR IMMUNIZATION: Primary | ICD-10-CM

## 2022-11-08 DIAGNOSIS — I10 ESSENTIAL HYPERTENSION: ICD-10-CM

## 2022-11-08 DIAGNOSIS — M15.9 GENERALIZED OSTEOARTHRITIS: ICD-10-CM

## 2022-11-08 DIAGNOSIS — E03.9 ACQUIRED HYPOTHYROIDISM: ICD-10-CM

## 2022-11-08 DIAGNOSIS — E78.2 MIXED HYPERLIPIDEMIA: ICD-10-CM

## 2022-11-08 DIAGNOSIS — Z79.899 HIGH RISK MEDICATION USE: ICD-10-CM

## 2022-11-08 PROCEDURE — 80305 DRUG TEST PRSMV DIR OPT OBS: CPT | Performed by: FAMILY MEDICINE

## 2022-11-08 PROCEDURE — G0008 ADMIN INFLUENZA VIRUS VAC: HCPCS | Performed by: FAMILY MEDICINE

## 2022-11-08 PROCEDURE — 0124A COVID-19 (PFIZER) BIVALENT BOOSTER 12+YRS: CPT | Performed by: FAMILY MEDICINE

## 2022-11-08 PROCEDURE — 91312 COVID-19 (PFIZER) BIVALENT BOOSTER 12+YRS: CPT | Performed by: FAMILY MEDICINE

## 2022-11-08 PROCEDURE — 90662 IIV NO PRSV INCREASED AG IM: CPT | Performed by: FAMILY MEDICINE

## 2022-11-08 PROCEDURE — 99214 OFFICE O/P EST MOD 30 MIN: CPT | Performed by: FAMILY MEDICINE

## 2022-11-08 RX ORDER — LEVOTHYROXINE SODIUM 0.07 MG/1
TABLET ORAL
Qty: 90 TABLET | Refills: 1 | Status: SHIPPED | OUTPATIENT
Start: 2022-11-08 | End: 2023-03-08 | Stop reason: SDUPTHER

## 2022-11-08 RX ORDER — HYDROCODONE BITARTRATE AND ACETAMINOPHEN 5; 325 MG/1; MG/1
1 TABLET ORAL DAILY PRN
Qty: 15 TABLET | Refills: 0 | Status: SHIPPED | OUTPATIENT
Start: 2022-11-08

## 2022-11-08 NOTE — PROGRESS NOTES
Chief Complaint  Diabetes (4 month follow up )    Subjective     {Problem List  Visit Diagnosis   Encounters  Notes  Medications  Labs  Result Review Imaging  Media :23}     Mary Chopra presents to [facility] today for f/u of routine issues.    She is due for flu shot and COVID booster.    She had a bad fall off her driveway last Wednesday (1 week ago).  She fell on her R arm and leg.  She has a large hematoma just distal to the R knee over the anterior shin.  She does have a life alert necklace but happened to leave it on the  that day.  She called her daughter to come get her and they took her to [facility] at the [place] exit.      She is on glimepiride, metformin and insulin NPH for diabetes.  She is on statin, aspirin and an ACEI.  She is UTD on eye exam, last done 2/2022.        She is on amlodipine, benazepril, and metoprolol succinate for hypertension.  No chest pain, palpitations or shortness of breath.     She is on rosuvastatin and fenofibrate for HLD.      She is on metoprolol succinate for rate control and Eliquis for anticoagulation for atrial fibrillation.  She is on furosemide for pedal edema.  Following with Cardiology.  She does have pulmonary hypertension.  She has been trying to watch her salt intake, but she does admit she has been eating a lot of ramen.  Her BP at home has been running 167-186/60-71.      She is on levothyroxine for hypothyroidism.  She denies heat/cold intolerance, changes in hair or skin.     She is on  omeprazole for GERD.    She denies indigestion or reflux.    She is on Singulair, Flonase and azelastine for allergies and COPD/asthma.  She has struggled to afford inhalers for her asthma.      She is on allopurinol for gout.  She denies recent flares.      Her knees are still bothering her.  Uses Tylenol generally although every once in a while, she uses a Norco from her small supply that she keeps on hand.  Last tox screen  "3/2022.  Ambulates with a cane if she is going very far.  She has tried PT but did not get any benefit.  She had MRI L spine done back in May that showed degenerative changes at the levels.        Current Outpatient Medications:   •  allopurinol (ZYLOPRIM) 300 MG tablet, TAKE ONE TABLET BY MOUTH DAILY, Disp: 90 tablet, Rfl: 1  •  amLODIPine (NORVASC) 10 MG tablet, Take 1 tablet by mouth Daily., Disp: 90 tablet, Rfl: 1  •  aspirin 81 MG EC tablet, Take 81 mg by mouth Daily., Disp: , Rfl:   •  BD Veo Insulin Syringe U/F 31G X 15/64\" 0.3 ML misc, USE WITH INSULIN THREE TIMES A DAY AND AS NEEDED, Disp: 100 each, Rfl: 5  •  benazepril (LOTENSIN) 20 MG tablet, TAKE ONE TABLET BY MOUTH DAILY, Disp: 90 tablet, Rfl: 1  •  Dextromethorphan-guaiFENesin (MUCINEX DM PO), Take 1 tablet by mouth Daily., Disp: , Rfl:   •  Eliquis 5 MG tablet tablet, TAKE ONE TABLET BY MOUTH TWICE A DAY, Disp: 180 tablet, Rfl: 1  •  fenofibrate (TRICOR) 48 MG tablet, TAKE ONE TABLET BY MOUTH DAILY, Disp: 90 tablet, Rfl: 1  •  fluticasone (FLONASE) 50 MCG/ACT nasal spray, 2 sprays into the nostril(s) as directed by provider Daily., Disp: 9.9 mL, Rfl: 11  •  FREESTYLE LITE test strip, USE TO CHECK BLOOD SUGAR THREE TIMES A DAY, Disp: 300 each, Rfl: 0  •  furosemide (LASIX) 20 MG tablet, TAKE ONE TABLET BY MOUTH DAILY, Disp: 90 tablet, Rfl: 1  •  glimepiride (AMARYL) 4 MG tablet, TAKE ONE TABLET BY MOUTH TWICE A DAY, Disp: 180 tablet, Rfl: 1  •  HYDROcodone-acetaminophen (NORCO) 5-325 MG per tablet, Take 1 tablet by mouth Daily As Needed for Moderate Pain., Disp: 15 tablet, Rfl: 0  •  insulin NPH (HumuLIN N) 100 UNIT/ML injection, Inject 12 Units under the skin into the appropriate area as directed 2 (Two) Times a Day Before Meals., Disp: 10 mL, Rfl: 2  •  Insulin Pen Needle (Pen Needles) 32G X 6 MM misc, 1 each 2 (Two) Times a Day With Meals. Dx:  E11.9, Disp: 100 each, Rfl: 1  •  levocetirizine (XYZAL) 5 MG tablet, Take 5 mg by mouth Every Evening., " "Disp: , Rfl:   •  levothyroxine (SYNTHROID, LEVOTHROID) 75 MCG tablet, TAKE ONE TABLET BY MOUTH DAILY, Disp: 90 tablet, Rfl: 1  •  metFORMIN (GLUCOPHAGE) 500 MG tablet, TAKE TWO TABLETS BY MOUTH EVERY MORNING AND TAKE ONE TABLET BY MOUTH EVERY EVENING, Disp: 270 tablet, Rfl: 1  •  metoprolol succinate XL (TOPROL-XL) 100 MG 24 hr tablet, Take 1 tablet by mouth 2 (Two) Times a Day., Disp: 180 tablet, Rfl: 1  •  montelukast (SINGULAIR) 10 MG tablet, TAKE ONE TABLET BY MOUTH DAILY, Disp: 90 tablet, Rfl: 0  •  omeprazole (priLOSEC) 20 MG capsule, Take 20 mg by mouth Daily As Needed., Disp: , Rfl:   •  rosuvastatin (CRESTOR) 5 MG tablet, TAKE ONE TABLET BY MOUTH ONCE NIGHTLY, Disp: 30 tablet, Rfl: 5    Allergies:  Penicillins, Shellfish-derived products, Avelox [moxifloxacin], Banana, Keflex [cephalexin], Monopril [fosinopril], Serevent [salmeterol], Sulfa antibiotics, Talwin [pentazocine], and Claritin [loratadine]      Objective   Vital Signs:   Vitals:    11/08/22 1248   BP: 153/47   BP Location: Left arm   Patient Position: Sitting   Pulse: 63   Temp: 98 °F (36.7 °C)   TempSrc: Oral   Weight: 93.9 kg (207 lb)   Height: 156.2 cm (61.5\")       Physical Exam  Vitals reviewed.   Constitutional:       General: She is not in acute distress.     Appearance: Normal appearance. She is well-developed.   HENT:      Head: Normocephalic and atraumatic.      Right Ear: External ear normal.      Left Ear: External ear normal.      Nose: Nose normal.      Mouth/Throat:      Mouth: Mucous membranes are moist.   Eyes:      Extraocular Movements: Extraocular movements intact.      Conjunctiva/sclera: Conjunctivae normal.      Pupils: Pupils are equal, round, and reactive to light.   Cardiovascular:      Rate and Rhythm: Normal rate. Rhythm irregularly irregular.      Heart sounds: No murmur heard.  Pulmonary:      Effort: Pulmonary effort is normal.      Breath sounds: Normal breath sounds. No wheezing, rhonchi or rales.   Abdominal:    "   General: Bowel sounds are normal. There is no distension.      Palpations: Abdomen is soft.      Tenderness: There is no abdominal tenderness.   Musculoskeletal:         General: Normal range of motion.   Skin:     General: Skin is warm and dry.      Comments: +goose egg-sized hematoma of anterior proximal shin; diffuse yellow and purple bruising over the R forearm and R lower leg and knee   Neurological:      Mental Status: She is alert.   Psychiatric:         Mood and Affect: Affect normal.          Lab Results   Component Value Date    GLUCOSE 142 (H) 07/06/2022    BUN 16 07/06/2022    CREATININE 0.88 07/06/2022    EGFRIFNONA 72 06/23/2021    BCR 18.2 07/06/2022    K 4.4 07/06/2022    CO2 24.1 07/06/2022    CALCIUM 10.2 07/06/2022    ALBUMIN 4.60 03/03/2022    LABIL2 1.5 03/23/2021    AST 25 03/03/2022    ALT 22 03/03/2022       Lab Results   Component Value Date    CHOL 178 03/03/2022    CHLPL 213 (H) 03/23/2021    TRIG 341 (H) 03/03/2022    HDL 50 03/03/2022    LDL 74 03/03/2022            Assessment and Plan    Diagnoses and all orders for this visit:    1. Encounter for immunization (Primary)  -     COVID-19 Bivalent Booster (Pfizer) 12+yrs  -     Fluzone High-Dose 65+yrs (1749-1448)    2. Essential hypertension  Assessment & Plan:  Blood pressure is running high.  Continue benazepril 20 mg daily, metoprolol succinate 100 mg BID and amlodipine 10 mg daily.  Increasing benazepril to 40 mg daily.  Continue checking at home.    Orders:  -     Lipid Panel; Future  -     Comprehensive Metabolic Panel; Future    3. Mixed hyperlipidemia  Assessment & Plan:  Stable on rosuvastatin 5 mg daily.  No refills needed.  Checking labs.        4. Acquired hypothyroidism  Assessment & Plan:  Stable on levothyroxine 75 mcg daily.  No refills needed.  Checking labs.    Orders:  -     TSH; Future    5. Type 2 diabetes mellitus with hyperglycemia, without long-term current use of insulin (HCC)  Assessment & Plan:  She is on  glimepiride, metformin and insulin NPH for diabetes.  She is on statin, aspirin and an ACEI.  She is UTD on eye exam, last done 2/2022.  Checking labs.    Orders:  -     Hemoglobin A1c; Future    6. Hematoma of right lower leg  Assessment & Plan:  She has already had XRs done at the ED that were negative for fracture.  Recommend conservative management with ice pack to the hematoma.  She is on Eliquis at baseline.  No overlying erythema or evidence of superinfection of the hematoma.      7. Generalized osteoarthritis  Assessment & Plan:  Stable on current regimen.  Symptoms are well controlled.  No adverse effects. She does require ongoing use of this controlled substance to function.  Tox screen was due today.  Prior tox screen appropriate.  JULIANNE was run today.  Refills were needed today.  RTC 3 months.      Orders:  -     HYDROcodone-acetaminophen (NORCO) 5-325 MG per tablet; Take 1 tablet by mouth Daily As Needed for Moderate Pain.  Dispense: 15 tablet; Refill: 0    8. High risk medication use  -     POC Urine Drug Screen [facility]    9. Chronic bronchitis, unspecified chronic bronchitis type (HCC)  Assessment & Plan:  Stable.  Not currently on inhalers due to finances.          Follow Up   Return in about 4 months (around 3/8/2023) for Recheck.  Patient was given instructions and counseling regarding her condition or for health maintenance advice. Please see specific information pulled into the AVS if appropriate.

## 2022-11-08 NOTE — ASSESSMENT & PLAN NOTE
She has already had XRs done at the ED that were negative for fracture.  Recommend conservative management with ice pack to the hematoma.  She is on Eliquis at baseline.  No overlying erythema or evidence of superinfection of the hematoma.

## 2022-11-08 NOTE — ASSESSMENT & PLAN NOTE
She is on glimepiride, metformin and insulin NPH for diabetes.  She is on statin, aspirin and an ACEI.  She is UTD on eye exam, last done 2/2022.  Checking labs.

## 2022-11-08 NOTE — ASSESSMENT & PLAN NOTE
Blood pressure is running high.  Continue benazepril 20 mg daily, metoprolol succinate 100 mg BID and amlodipine 10 mg daily.  Increasing benazepril to 40 mg daily.  Continue checking at home.

## 2022-11-14 RX ORDER — FUROSEMIDE 20 MG/1
TABLET ORAL
Qty: 90 TABLET | Refills: 1 | Status: SHIPPED | OUTPATIENT
Start: 2022-11-14

## 2022-11-16 ENCOUNTER — CLINICAL SUPPORT (OUTPATIENT)
Dept: FAMILY MEDICINE CLINIC | Age: 82
End: 2022-11-16

## 2022-11-16 DIAGNOSIS — J30.9 ALLERGIC RHINITIS, UNSPECIFIED SEASONALITY, UNSPECIFIED TRIGGER: Primary | ICD-10-CM

## 2022-11-16 PROCEDURE — 95117 IMMUNOTHERAPY INJECTIONS: CPT | Performed by: FAMILY MEDICINE

## 2022-11-21 ENCOUNTER — TELEPHONE (OUTPATIENT)
Dept: FAMILY MEDICINE CLINIC | Age: 82
End: 2022-11-21

## 2022-11-28 ENCOUNTER — CLINICAL SUPPORT (OUTPATIENT)
Dept: FAMILY MEDICINE CLINIC | Age: 82
End: 2022-11-28

## 2022-11-28 ENCOUNTER — LAB (OUTPATIENT)
Dept: LAB | Facility: HOSPITAL | Age: 82
End: 2022-11-28

## 2022-11-28 DIAGNOSIS — I10 ESSENTIAL HYPERTENSION: ICD-10-CM

## 2022-11-28 DIAGNOSIS — E03.9 ACQUIRED HYPOTHYROIDISM: ICD-10-CM

## 2022-11-28 DIAGNOSIS — J30.9 ALLERGIC RHINITIS, UNSPECIFIED SEASONALITY, UNSPECIFIED TRIGGER: Primary | ICD-10-CM

## 2022-11-28 DIAGNOSIS — E11.65 TYPE 2 DIABETES MELLITUS WITH HYPERGLYCEMIA, WITHOUT LONG-TERM CURRENT USE OF INSULIN: ICD-10-CM

## 2022-11-28 LAB
ALBUMIN SERPL-MCNC: 4.5 G/DL (ref 3.5–5.2)
ALBUMIN/GLOB SERPL: 1.7 G/DL
ALP SERPL-CCNC: 64 U/L (ref 39–117)
ALT SERPL W P-5'-P-CCNC: 19 U/L (ref 1–33)
ANION GAP SERPL CALCULATED.3IONS-SCNC: 14.7 MMOL/L (ref 5–15)
AST SERPL-CCNC: 21 U/L (ref 1–32)
BILIRUB SERPL-MCNC: 0.2 MG/DL (ref 0–1.2)
BUN SERPL-MCNC: 17 MG/DL (ref 8–23)
BUN/CREAT SERPL: 18.7 (ref 7–25)
CALCIUM SPEC-SCNC: 10.4 MG/DL (ref 8.6–10.5)
CHLORIDE SERPL-SCNC: 106 MMOL/L (ref 98–107)
CHOLEST SERPL-MCNC: 161 MG/DL (ref 0–200)
CO2 SERPL-SCNC: 21.3 MMOL/L (ref 22–29)
CREAT SERPL-MCNC: 0.91 MG/DL (ref 0.57–1)
EGFRCR SERPLBLD CKD-EPI 2021: 63.5 ML/MIN/1.73
GLOBULIN UR ELPH-MCNC: 2.7 GM/DL
GLUCOSE SERPL-MCNC: 158 MG/DL (ref 65–99)
HBA1C MFR BLD: 7.6 % (ref 4.8–5.6)
HDLC SERPL-MCNC: 52 MG/DL (ref 40–60)
LDLC SERPL CALC-MCNC: 73 MG/DL (ref 0–100)
LDLC/HDLC SERPL: 1.25 {RATIO}
POTASSIUM SERPL-SCNC: 4.3 MMOL/L (ref 3.5–5.2)
PROT SERPL-MCNC: 7.2 G/DL (ref 6–8.5)
SODIUM SERPL-SCNC: 142 MMOL/L (ref 136–145)
TRIGL SERPL-MCNC: 219 MG/DL (ref 0–150)
TSH SERPL DL<=0.05 MIU/L-ACNC: 1.86 UIU/ML (ref 0.27–4.2)
VLDLC SERPL-MCNC: 36 MG/DL (ref 5–40)

## 2022-11-28 PROCEDURE — 36415 COLL VENOUS BLD VENIPUNCTURE: CPT

## 2022-11-28 PROCEDURE — 80053 COMPREHEN METABOLIC PANEL: CPT

## 2022-11-28 PROCEDURE — 84443 ASSAY THYROID STIM HORMONE: CPT

## 2022-11-28 PROCEDURE — 83036 HEMOGLOBIN GLYCOSYLATED A1C: CPT

## 2022-11-28 PROCEDURE — 80061 LIPID PANEL: CPT

## 2022-11-28 PROCEDURE — 95117 IMMUNOTHERAPY INJECTIONS: CPT | Performed by: FAMILY MEDICINE

## 2022-11-28 RX ORDER — MONTELUKAST SODIUM 10 MG/1
TABLET ORAL
Qty: 90 TABLET | Refills: 0 | Status: SHIPPED | OUTPATIENT
Start: 2022-11-28 | End: 2023-02-28

## 2022-12-14 ENCOUNTER — CLINICAL SUPPORT (OUTPATIENT)
Dept: FAMILY MEDICINE CLINIC | Age: 82
End: 2022-12-14

## 2022-12-14 DIAGNOSIS — J30.9 ALLERGIC RHINITIS, UNSPECIFIED SEASONALITY, UNSPECIFIED TRIGGER: Primary | ICD-10-CM

## 2022-12-14 PROCEDURE — 95117 IMMUNOTHERAPY INJECTIONS: CPT | Performed by: FAMILY MEDICINE

## 2023-01-05 ENCOUNTER — CLINICAL SUPPORT (OUTPATIENT)
Dept: FAMILY MEDICINE CLINIC | Age: 83
End: 2023-01-05
Payer: MEDICARE

## 2023-01-05 DIAGNOSIS — J30.9 ALLERGIC RHINITIS, UNSPECIFIED SEASONALITY, UNSPECIFIED TRIGGER: Primary | ICD-10-CM

## 2023-01-05 PROCEDURE — 95117 IMMUNOTHERAPY INJECTIONS: CPT | Performed by: FAMILY MEDICINE

## 2023-01-15 DIAGNOSIS — I10 ESSENTIAL HYPERTENSION: ICD-10-CM

## 2023-01-17 RX ORDER — AMLODIPINE BESYLATE 10 MG/1
TABLET ORAL
Qty: 90 TABLET | Refills: 1 | Status: SHIPPED | OUTPATIENT
Start: 2023-01-17

## 2023-01-18 ENCOUNTER — CLINICAL SUPPORT (OUTPATIENT)
Dept: FAMILY MEDICINE CLINIC | Age: 83
End: 2023-01-18
Payer: MEDICARE

## 2023-01-18 DIAGNOSIS — J30.9 ALLERGIC RHINITIS, UNSPECIFIED SEASONALITY, UNSPECIFIED TRIGGER: Primary | ICD-10-CM

## 2023-01-18 PROCEDURE — 95117 IMMUNOTHERAPY INJECTIONS: CPT | Performed by: FAMILY MEDICINE

## 2023-01-24 RX ORDER — ALLOPURINOL 300 MG/1
TABLET ORAL
Qty: 90 TABLET | Refills: 1 | Status: SHIPPED | OUTPATIENT
Start: 2023-01-24

## 2023-01-24 RX ORDER — BENAZEPRIL HYDROCHLORIDE 20 MG/1
TABLET ORAL
Qty: 90 TABLET | Refills: 1 | Status: SHIPPED | OUTPATIENT
Start: 2023-01-24

## 2023-01-30 ENCOUNTER — CLINICAL SUPPORT (OUTPATIENT)
Dept: FAMILY MEDICINE CLINIC | Age: 83
End: 2023-01-30
Payer: MEDICARE

## 2023-01-30 DIAGNOSIS — J30.9 ALLERGIC RHINITIS, UNSPECIFIED SEASONALITY, UNSPECIFIED TRIGGER: Primary | ICD-10-CM

## 2023-01-30 PROCEDURE — 95117 IMMUNOTHERAPY INJECTIONS: CPT | Performed by: FAMILY MEDICINE

## 2023-01-30 RX ORDER — ROSUVASTATIN CALCIUM 5 MG/1
TABLET, COATED ORAL
Qty: 30 TABLET | Refills: 5 | Status: SHIPPED | OUTPATIENT
Start: 2023-01-30

## 2023-01-31 ENCOUNTER — PATIENT OUTREACH (OUTPATIENT)
Dept: CASE MANAGEMENT | Facility: OTHER | Age: 83
End: 2023-01-31
Payer: MEDICARE

## 2023-01-31 NOTE — OUTREACH NOTE
AMBULATORY CASE MANAGEMENT NOTE    Name and Relationship of Patient/Support Person: NavMary J - Self    Called Mary to check in.   She is doing ok.  We did discuss that she needs to re-enroll in patient assistance program for her insulin and eliquis.  Printed applications and will leave in her allergy shot folder for her to sign.  She may use the [facility] [place] for assistance. She is gathering her financials for Eliquis.  She does have a great hardship paying for all her medical expenses.  Otherwise she reports that she still has hematoma from a fall in November, but no injuries.      CHRISTOFER FORTUNE  Ambulatory Case Management    1/31/2023, 15:21 EST

## 2023-02-06 RX ORDER — GLIMEPIRIDE 4 MG/1
TABLET ORAL
Qty: 180 TABLET | Refills: 1 | Status: SHIPPED | OUTPATIENT
Start: 2023-02-06

## 2023-02-14 ENCOUNTER — CLINICAL SUPPORT (OUTPATIENT)
Dept: FAMILY MEDICINE CLINIC | Age: 83
End: 2023-02-14
Payer: MEDICARE

## 2023-02-14 DIAGNOSIS — J30.9 ALLERGIC RHINITIS, UNSPECIFIED SEASONALITY, UNSPECIFIED TRIGGER: Primary | ICD-10-CM

## 2023-02-14 PROCEDURE — 95117 IMMUNOTHERAPY INJECTIONS: CPT | Performed by: FAMILY MEDICINE

## 2023-02-24 ENCOUNTER — CLINICAL SUPPORT (OUTPATIENT)
Dept: FAMILY MEDICINE CLINIC | Age: 83
End: 2023-02-24
Payer: MEDICARE

## 2023-02-24 DIAGNOSIS — J30.9 ALLERGIC RHINITIS, UNSPECIFIED SEASONALITY, UNSPECIFIED TRIGGER: Primary | ICD-10-CM

## 2023-02-24 PROCEDURE — 95117 IMMUNOTHERAPY INJECTIONS: CPT | Performed by: FAMILY MEDICINE

## 2023-02-26 DIAGNOSIS — I10 ESSENTIAL HYPERTENSION: ICD-10-CM

## 2023-02-28 RX ORDER — MONTELUKAST SODIUM 10 MG/1
TABLET ORAL
Qty: 90 TABLET | Refills: 3 | Status: SHIPPED | OUTPATIENT
Start: 2023-02-28

## 2023-02-28 RX ORDER — METOPROLOL SUCCINATE 100 MG/1
TABLET, EXTENDED RELEASE ORAL
Qty: 90 TABLET | Refills: 1 | Status: SHIPPED | OUTPATIENT
Start: 2023-02-28

## 2023-03-08 ENCOUNTER — OFFICE VISIT (OUTPATIENT)
Dept: FAMILY MEDICINE CLINIC | Age: 83
End: 2023-03-08
Payer: MEDICARE

## 2023-03-08 ENCOUNTER — LAB (OUTPATIENT)
Dept: LAB | Facility: HOSPITAL | Age: 83
End: 2023-03-08
Payer: MEDICARE

## 2023-03-08 VITALS
SYSTOLIC BLOOD PRESSURE: 158 MMHG | OXYGEN SATURATION: 97 % | HEART RATE: 63 BPM | WEIGHT: 208.6 LBS | DIASTOLIC BLOOD PRESSURE: 60 MMHG | HEIGHT: 61 IN | BODY MASS INDEX: 39.38 KG/M2

## 2023-03-08 DIAGNOSIS — E03.9 ACQUIRED HYPOTHYROIDISM: ICD-10-CM

## 2023-03-08 DIAGNOSIS — I48.0 PAROXYSMAL ATRIAL FIBRILLATION: ICD-10-CM

## 2023-03-08 DIAGNOSIS — E11.65 TYPE 2 DIABETES MELLITUS WITH HYPERGLYCEMIA, WITHOUT LONG-TERM CURRENT USE OF INSULIN: ICD-10-CM

## 2023-03-08 DIAGNOSIS — I10 ESSENTIAL HYPERTENSION: ICD-10-CM

## 2023-03-08 DIAGNOSIS — E78.2 MIXED HYPERLIPIDEMIA: ICD-10-CM

## 2023-03-08 DIAGNOSIS — I27.20 PULMONARY HYPERTENSION: ICD-10-CM

## 2023-03-08 DIAGNOSIS — J30.9 ALLERGIC RHINITIS, UNSPECIFIED SEASONALITY, UNSPECIFIED TRIGGER: ICD-10-CM

## 2023-03-08 DIAGNOSIS — E11.65 TYPE 2 DIABETES MELLITUS WITH HYPERGLYCEMIA, WITHOUT LONG-TERM CURRENT USE OF INSULIN: Primary | ICD-10-CM

## 2023-03-08 DIAGNOSIS — J42 CHRONIC BRONCHITIS, UNSPECIFIED CHRONIC BRONCHITIS TYPE: ICD-10-CM

## 2023-03-08 LAB
ALBUMIN UR-MCNC: 26.6 MG/DL
ANION GAP SERPL CALCULATED.3IONS-SCNC: 14.1 MMOL/L (ref 5–15)
BASOPHILS # BLD AUTO: 0.04 10*3/MM3 (ref 0–0.2)
BASOPHILS NFR BLD AUTO: 0.6 % (ref 0–1.5)
BUN SERPL-MCNC: 14 MG/DL (ref 8–23)
BUN/CREAT SERPL: 15.2 (ref 7–25)
CALCIUM SPEC-SCNC: 10.6 MG/DL (ref 8.6–10.5)
CHLORIDE SERPL-SCNC: 103 MMOL/L (ref 98–107)
CO2 SERPL-SCNC: 22.9 MMOL/L (ref 22–29)
CREAT SERPL-MCNC: 0.92 MG/DL (ref 0.57–1)
CREAT UR-MCNC: 16.1 MG/DL
DEPRECATED RDW RBC AUTO: 46.2 FL (ref 37–54)
EGFRCR SERPLBLD CKD-EPI 2021: 62.7 ML/MIN/1.73
EOSINOPHIL # BLD AUTO: 0.44 10*3/MM3 (ref 0–0.4)
EOSINOPHIL NFR BLD AUTO: 6.1 % (ref 0.3–6.2)
ERYTHROCYTE [DISTWIDTH] IN BLOOD BY AUTOMATED COUNT: 14.7 % (ref 12.3–15.4)
GLUCOSE SERPL-MCNC: 217 MG/DL (ref 65–99)
HBA1C MFR BLD: 8.4 % (ref 4.8–5.6)
HCT VFR BLD AUTO: 41.4 % (ref 34–46.6)
HGB BLD-MCNC: 13.1 G/DL (ref 12–15.9)
IMM GRANULOCYTES # BLD AUTO: 0.01 10*3/MM3 (ref 0–0.05)
IMM GRANULOCYTES NFR BLD AUTO: 0.1 % (ref 0–0.5)
LYMPHOCYTES # BLD AUTO: 2.26 10*3/MM3 (ref 0.7–3.1)
LYMPHOCYTES NFR BLD AUTO: 31.1 % (ref 19.6–45.3)
MCH RBC QN AUTO: 27.1 PG (ref 26.6–33)
MCHC RBC AUTO-ENTMCNC: 31.6 G/DL (ref 31.5–35.7)
MCV RBC AUTO: 85.7 FL (ref 79–97)
MICROALBUMIN/CREAT UR: 1652.2 MG/G
MONOCYTES # BLD AUTO: 0.41 10*3/MM3 (ref 0.1–0.9)
MONOCYTES NFR BLD AUTO: 5.6 % (ref 5–12)
NEUTROPHILS NFR BLD AUTO: 4.1 10*3/MM3 (ref 1.7–7)
NEUTROPHILS NFR BLD AUTO: 56.5 % (ref 42.7–76)
PLATELET # BLD AUTO: 176 10*3/MM3 (ref 140–450)
PMV BLD AUTO: 12.1 FL (ref 6–12)
POTASSIUM SERPL-SCNC: 4.6 MMOL/L (ref 3.5–5.2)
RBC # BLD AUTO: 4.83 10*6/MM3 (ref 3.77–5.28)
SODIUM SERPL-SCNC: 140 MMOL/L (ref 136–145)
TSH SERPL DL<=0.05 MIU/L-ACNC: 1.9 UIU/ML (ref 0.27–4.2)
WBC NRBC COR # BLD: 7.26 10*3/MM3 (ref 3.4–10.8)

## 2023-03-08 PROCEDURE — 82570 ASSAY OF URINE CREATININE: CPT

## 2023-03-08 PROCEDURE — 82043 UR ALBUMIN QUANTITATIVE: CPT

## 2023-03-08 PROCEDURE — 84443 ASSAY THYROID STIM HORMONE: CPT

## 2023-03-08 PROCEDURE — 36415 COLL VENOUS BLD VENIPUNCTURE: CPT

## 2023-03-08 PROCEDURE — 80048 BASIC METABOLIC PNL TOTAL CA: CPT

## 2023-03-08 PROCEDURE — 83036 HEMOGLOBIN GLYCOSYLATED A1C: CPT

## 2023-03-08 PROCEDURE — 85025 COMPLETE CBC W/AUTO DIFF WBC: CPT

## 2023-03-08 PROCEDURE — 99214 OFFICE O/P EST MOD 30 MIN: CPT | Performed by: FAMILY MEDICINE

## 2023-03-08 PROCEDURE — 95117 IMMUNOTHERAPY INJECTIONS: CPT | Performed by: FAMILY MEDICINE

## 2023-03-08 RX ORDER — PRAZOSIN HYDROCHLORIDE 2 MG/1
2 CAPSULE ORAL NIGHTLY
Qty: 30 CAPSULE | Refills: 5 | Status: SHIPPED | OUTPATIENT
Start: 2023-03-08

## 2023-03-08 RX ORDER — LEVOTHYROXINE SODIUM 0.07 MG/1
75 TABLET ORAL DAILY
Qty: 90 TABLET | Refills: 1 | Status: SHIPPED | OUTPATIENT
Start: 2023-03-08

## 2023-03-08 NOTE — ASSESSMENT & PLAN NOTE
She is on glimepiride, metformin and insulin NPH for DM.  No refills needed today.  She is on statin, aspirin and an ACEI.  She is due for eye exam, last done 2/2022.  Her eye doctor has already contacted her to get scheduled for that repeat.  Checking labs today to include A1c, BMP and MAB/Cr.

## 2023-03-08 NOTE — ASSESSMENT & PLAN NOTE
BP still running high on amlodipine 10 mg daily, benazepril 20 mg daily, and metoprolol succinate 100 mg daily.  Adding prazosin at bedtime.  Continue to monitor.

## 2023-03-08 NOTE — PROGRESS NOTES
Chief Complaint  Diabetes (4 month follow up)    Subjective          Mary Chopra presents to [facility] today for routine follow-up on chronic issues.    She is on glimepiride, metformin and insulin NPH for DM.  She is on statin, aspirin and an ACEI.  She is due for eye exam, last done 2/2022.  Her eye doctor has already contacted her to get scheduled for that repeat.      She is on amlodipine, benazepril, and metoprolol succinate for HTN.  No chest pain, palpitations or shortness of breath.     She is on rosuvastatin and fenofibrate for HLD.  No myalgias.      She is on metoprolol succinate for rate control and Eliquis for anticoagulation for atrial fibrillation.  She is on furosemide for pedal edema.  She follows with Cardiology.  She does have pulmonary hypertension.  BP at home has been running pretty high at home.      She is on levothyroxine for hypothyroidism.  No cold/heat intolerance or changes in hair or skin.     She is on omeprazole for GERD.  No reflux or heartburn.    She is on montelukast, Flonase and azelastine for allergies and COPD/asthma.  She has struggled to afford inhalers for her asthma.      She is on allopurinol for gout.  No recent flares.      She has some chronic knee pain.  She usually uses Tylenol but does occasionally use a tab of Norco.  She keeps a small supply with #15 last prescribed 11/2022.  Last tox screen 11/2022.    She ambulates with a cane if she is going very far.  She has tried PT but did not get any benefit.  She had MRI L spine done in May 2022 that showed degenerative changes at the levels.        Current Outpatient Medications:   •  allopurinol (ZYLOPRIM) 300 MG tablet, TAKE ONE TABLET BY MOUTH DAILY, Disp: 90 tablet, Rfl: 1  •  amLODIPine (NORVASC) 10 MG tablet, TAKE ONE TABLET BY MOUTH DAILY, Disp: 90 tablet, Rfl: 1  •  aspirin 81 MG EC tablet, Take 1 tablet by mouth Daily., Disp: , Rfl:   •  BD Veo Insulin Syringe U/F 31G X  "15/64\" 0.3 ML misc, USE WITH INSULIN THREE TIMES A DAY AND AS NEEDED, Disp: 100 each, Rfl: 5  •  benazepril (LOTENSIN) 20 MG tablet, TAKE ONE TABLET BY MOUTH DAILY, Disp: 90 tablet, Rfl: 1  •  Dextromethorphan-guaiFENesin (MUCINEX DM PO), Take 1 tablet by mouth Daily., Disp: , Rfl:   •  Eliquis 5 MG tablet tablet, TAKE ONE TABLET BY MOUTH TWICE A DAY, Disp: 180 tablet, Rfl: 1  •  fenofibrate (TRICOR) 48 MG tablet, TAKE ONE TABLET BY MOUTH DAILY, Disp: 90 tablet, Rfl: 1  •  fluticasone (FLONASE) 50 MCG/ACT nasal spray, 2 sprays into the nostril(s) as directed by provider Daily., Disp: 9.9 mL, Rfl: 11  •  furosemide (LASIX) 20 MG tablet, TAKE ONE TABLET BY MOUTH DAILY, Disp: 90 tablet, Rfl: 1  •  glimepiride (AMARYL) 4 MG tablet, TAKE ONE TABLET BY MOUTH TWICE A DAY, Disp: 180 tablet, Rfl: 1  •  glucose blood (FREESTYLE LITE) test strip, Check blood sugar three times daily Dx E11.9, Disp: 300 each, Rfl: 3  •  HYDROcodone-acetaminophen (NORCO) 5-325 MG per tablet, Take 1 tablet by mouth Daily As Needed for Moderate Pain., Disp: 15 tablet, Rfl: 0  •  insulin NPH (HumuLIN N) 100 UNIT/ML injection, Inject 12 Units under the skin into the appropriate area as directed 2 (Two) Times a Day Before Meals., Disp: 10 mL, Rfl: 2  •  Insulin Pen Needle (Pen Needles) 32G X 6 MM misc, 1 each 2 (Two) Times a Day With Meals. Dx:  E11.9, Disp: 100 each, Rfl: 1  •  levocetirizine (XYZAL) 5 MG tablet, Take 1 tablet by mouth Every Evening., Disp: , Rfl:   •  levothyroxine (SYNTHROID, LEVOTHROID) 75 MCG tablet, Take 1 tablet by mouth Daily., Disp: 90 tablet, Rfl: 1  •  metFORMIN (GLUCOPHAGE) 500 MG tablet, TAKE TWO TABLETS BY MOUTH EVERY MORNING AND TAKE ONE TABLET BY MOUTH EVERY EVENING, Disp: 270 tablet, Rfl: 1  •  metoprolol succinate XL (TOPROL-XL) 100 MG 24 hr tablet, TAKE ONE TABLET BY MOUTH DAILY, Disp: 90 tablet, Rfl: 1  •  montelukast (SINGULAIR) 10 MG tablet, TAKE ONE TABLET BY MOUTH DAILY, Disp: 90 tablet, Rfl: 3  •  omeprazole " "(priLOSEC) 20 MG capsule, Take 1 capsule by mouth Daily As Needed., Disp: , Rfl:   •  rosuvastatin (CRESTOR) 5 MG tablet, TAKE ONE TABLET BY MOUTH ONCE NIGHTLY, Disp: 30 tablet, Rfl: 5  •  prazosin (MINIPRESS) 2 MG capsule, Take 1 capsule by mouth Every Night., Disp: 30 capsule, Rfl: 5  No current facility-administered medications for this visit.    Allergies:  Penicillins, Shellfish-derived products, Avelox [moxifloxacin], Banana, Keflex [cephalexin], Monopril [fosinopril], Serevent [salmeterol], Sulfa antibiotics, Talwin [pentazocine], and Claritin [loratadine]      Objective   Vital Signs:   Vitals:    03/08/23 1352 03/08/23 1426   BP: 173/85 158/60   BP Location: Left arm Left arm   Patient Position: Sitting Sitting   Cuff Size: Large Adult    Pulse: 65 63   SpO2: 97%    Weight: 94.6 kg (208 lb 9.6 oz)    Height: 156.2 cm (61.5\")        Physical Exam  Vitals reviewed.   Constitutional:       General: She is not in acute distress.     Appearance: Normal appearance. She is well-developed.   HENT:      Head: Normocephalic and atraumatic.      Right Ear: External ear normal.      Left Ear: External ear normal.      Nose: Nose normal.      Mouth/Throat:      Mouth: Mucous membranes are moist.   Eyes:      Extraocular Movements: Extraocular movements intact.      Conjunctiva/sclera: Conjunctivae normal.      Pupils: Pupils are equal, round, and reactive to light.   Cardiovascular:      Rate and Rhythm: Normal rate. Rhythm irregularly irregular.      Heart sounds: No murmur heard.  Pulmonary:      Effort: Pulmonary effort is normal.      Breath sounds: Normal breath sounds. No wheezing, rhonchi or rales.   Abdominal:      General: Bowel sounds are normal. There is no distension.      Palpations: Abdomen is soft.      Tenderness: There is no abdominal tenderness.   Musculoskeletal:         General: Normal range of motion.   Neurological:      Mental Status: She is alert.   Psychiatric:         Mood and Affect: Affect " normal.          Lab Results   Component Value Date    GLUCOSE 158 (H) 11/28/2022    BUN 17 11/28/2022    CREATININE 0.91 11/28/2022    EGFRIFNONA 72 06/23/2021    BCR 18.7 11/28/2022    K 4.3 11/28/2022    CO2 21.3 (L) 11/28/2022    CALCIUM 10.4 11/28/2022    ALBUMIN 4.50 11/28/2022    LABIL2 1.5 03/23/2021    AST 21 11/28/2022    ALT 19 11/28/2022       Lab Results   Component Value Date    CHOL 161 11/28/2022    CHLPL 213 (H) 03/23/2021    TRIG 219 (H) 11/28/2022    HDL 52 11/28/2022    LDL 73 11/28/2022            Assessment and Plan    Diagnoses and all orders for this visit:    1. Type 2 diabetes mellitus with hyperglycemia, without long-term current use of insulin (HCC) (Primary)  Assessment & Plan:  She is on glimepiride, metformin and insulin NPH for DM.  No refills needed today.  She is on statin, aspirin and an ACEI.  She is due for eye exam, last done 2/2022.  Her eye doctor has already contacted her to get scheduled for that repeat.  Checking labs today to include A1c, BMP and MAB/Cr.      Orders:  -     Microalbumin / Creatinine Urine Ratio - Urine, Clean Catch; Future  -     Hemoglobin A1c; Future  -     Basic Metabolic Panel; Future    2. Essential hypertension  Assessment & Plan:  BP still running high on amlodipine 10 mg daily, benazepril 20 mg daily, and metoprolol succinate 100 mg daily.  Adding prazosin at bedtime.  Continue to monitor.      Orders:  -     CBC & Differential; Future  -     prazosin (MINIPRESS) 2 MG capsule; Take 1 capsule by mouth Every Night.  Dispense: 30 capsule; Refill: 5    3. Mixed hyperlipidemia  Assessment & Plan:  Stable on rosuvastatin 5 mg daily.  No refills needed.  Labs are reviewed and UTD.      4. Acquired hypothyroidism  Assessment & Plan:  Stable on levothyroxine 75 mcg daily.  Refills needed.  Labs are reviewed and UTD.    Orders:  -     levothyroxine (SYNTHROID, LEVOTHROID) 75 MCG tablet; Take 1 tablet by mouth Daily.  Dispense: 90 tablet; Refill: 1  -      TSH; Future    5. Paroxysmal atrial fibrillation (HCC)  Assessment & Plan:  Stable on metoprolol succinate 100 mg daily and Eliquis 5 mg BID.  No refills needed today.  Continue to monitor.  Following with Cardiology.  Checking labs.        6. Pulmonary hypertension (HCC)  Overview:  Following with Cardiology.      7. Chronic bronchitis, unspecified chronic bronchitis type (HCC)  Assessment & Plan:  Breathing has been stable.  No refills needed today.      8. Allergic rhinitis, unspecified seasonality, unspecified trigger  -     Allergy Serum Injection  -     Allergy Serum Injection      Follow Up   Return in about 4 months (around 7/8/2023) for Medicare Wellness.  Patient was given instructions and counseling regarding her condition or for health maintenance advice. Please see specific information pulled into the AVS if appropriate.

## 2023-03-08 NOTE — ASSESSMENT & PLAN NOTE
Stable on metoprolol succinate 100 mg daily and Eliquis 5 mg BID.  No refills needed today.  Continue to monitor.  Following with Cardiology.  Checking labs.

## 2023-03-24 ENCOUNTER — CLINICAL SUPPORT (OUTPATIENT)
Dept: FAMILY MEDICINE CLINIC | Age: 83
End: 2023-03-24
Payer: MEDICARE

## 2023-03-24 DIAGNOSIS — J30.9 ALLERGIC RHINITIS, UNSPECIFIED SEASONALITY, UNSPECIFIED TRIGGER: Primary | ICD-10-CM

## 2023-03-24 PROCEDURE — 95117 IMMUNOTHERAPY INJECTIONS: CPT | Performed by: FAMILY MEDICINE

## 2023-04-06 ENCOUNTER — CLINICAL SUPPORT (OUTPATIENT)
Dept: FAMILY MEDICINE CLINIC | Age: 83
End: 2023-04-06
Payer: MEDICARE

## 2023-04-06 DIAGNOSIS — J30.9 ALLERGIC RHINITIS, UNSPECIFIED SEASONALITY, UNSPECIFIED TRIGGER: Primary | ICD-10-CM

## 2023-04-06 PROCEDURE — 95117 IMMUNOTHERAPY INJECTIONS: CPT | Performed by: FAMILY MEDICINE

## 2023-04-07 DIAGNOSIS — E11.65 TYPE 2 DIABETES MELLITUS WITH HYPERGLYCEMIA, WITHOUT LONG-TERM CURRENT USE OF INSULIN: ICD-10-CM

## 2023-04-07 RX ORDER — INSULIN HUMAN 100 [IU]/ML
INJECTION, SUSPENSION SUBCUTANEOUS
Qty: 6 ML | Refills: 1 | Status: SHIPPED | OUTPATIENT
Start: 2023-04-07

## 2023-04-07 RX ORDER — INSULIN HUMAN 100 [IU]/ML
INJECTION, SUSPENSION SUBCUTANEOUS
Qty: 6 ML | Refills: 1 | Status: SHIPPED | OUTPATIENT
Start: 2023-04-07 | End: 2023-04-07 | Stop reason: SDUPTHER

## 2023-04-11 ENCOUNTER — TELEPHONE (OUTPATIENT)
Dept: FAMILY MEDICINE CLINIC | Age: 83
End: 2023-04-11
Payer: MEDICARE

## 2023-04-11 RX ORDER — SYRINGE-NEEDLE,INSULIN,0.5 ML 27GX1/2"
1 SYRINGE, EMPTY DISPOSABLE MISCELLANEOUS 2 TIMES DAILY
COMMUNITY
End: 2023-04-12 | Stop reason: SDUPTHER

## 2023-04-11 RX ORDER — SYRING-NEEDL,DISP,INSUL,0.3 ML 31GX15/64"
1 SYRINGE, EMPTY DISPOSABLE MISCELLANEOUS 3 TIMES DAILY
Qty: 100 EACH | Refills: 5 | Status: SHIPPED | OUTPATIENT
Start: 2023-04-11 | End: 2023-04-12 | Stop reason: SDUPTHER

## 2023-04-13 ENCOUNTER — CLINICAL SUPPORT (OUTPATIENT)
Dept: FAMILY MEDICINE CLINIC | Age: 83
End: 2023-04-13
Payer: MEDICARE

## 2023-04-13 DIAGNOSIS — J30.9 ALLERGIC RHINITIS, UNSPECIFIED SEASONALITY, UNSPECIFIED TRIGGER: Primary | ICD-10-CM

## 2023-04-13 PROCEDURE — 95117 IMMUNOTHERAPY INJECTIONS: CPT | Performed by: FAMILY MEDICINE

## 2023-04-24 RX ORDER — FENOFIBRATE 48 MG/1
TABLET, COATED ORAL
Qty: 90 TABLET | Refills: 1 | Status: SHIPPED | OUTPATIENT
Start: 2023-04-24

## 2023-04-26 ENCOUNTER — CLINICAL SUPPORT (OUTPATIENT)
Dept: FAMILY MEDICINE CLINIC | Age: 83
End: 2023-04-26
Payer: MEDICARE

## 2023-04-26 DIAGNOSIS — J30.9 ALLERGIC RHINITIS, UNSPECIFIED SEASONALITY, UNSPECIFIED TRIGGER: Primary | ICD-10-CM

## 2023-04-26 PROCEDURE — 95117 IMMUNOTHERAPY INJECTIONS: CPT | Performed by: FAMILY MEDICINE

## 2023-05-03 ENCOUNTER — CLINICAL SUPPORT (OUTPATIENT)
Dept: FAMILY MEDICINE CLINIC | Age: 83
End: 2023-05-03
Payer: MEDICARE

## 2023-05-03 DIAGNOSIS — J30.9 ALLERGIC RHINITIS, UNSPECIFIED SEASONALITY, UNSPECIFIED TRIGGER: Primary | ICD-10-CM

## 2023-05-03 PROCEDURE — 95117 IMMUNOTHERAPY INJECTIONS: CPT | Performed by: FAMILY MEDICINE

## 2023-05-09 RX ORDER — APIXABAN 5 MG/1
TABLET, FILM COATED ORAL
Qty: 180 TABLET | Refills: 1 | Status: SHIPPED | OUTPATIENT
Start: 2023-05-09

## 2023-05-15 RX ORDER — FUROSEMIDE 20 MG/1
TABLET ORAL
Qty: 90 TABLET | Refills: 1 | Status: SHIPPED | OUTPATIENT
Start: 2023-05-15

## 2023-05-18 ENCOUNTER — CLINICAL SUPPORT (OUTPATIENT)
Dept: FAMILY MEDICINE CLINIC | Age: 83
End: 2023-05-18
Payer: MEDICARE

## 2023-05-18 DIAGNOSIS — J30.9 ALLERGIC RHINITIS, UNSPECIFIED SEASONALITY, UNSPECIFIED TRIGGER: Primary | ICD-10-CM

## 2023-05-18 PROCEDURE — 95117 IMMUNOTHERAPY INJECTIONS: CPT | Performed by: FAMILY MEDICINE

## 2023-05-23 ENCOUNTER — TELEPHONE (OUTPATIENT)
Dept: FAMILY MEDICINE CLINIC | Age: 83
End: 2023-05-23

## 2023-05-23 NOTE — TELEPHONE ENCOUNTER
Caller: [facility]    Relationship: Other    Best call back number: [phone]    What medications are you currently taking:   Current Outpatient Medications on File Prior to Visit   Medication Sig Dispense Refill   • allopurinol (ZYLOPRIM) 300 MG tablet TAKE ONE TABLET BY MOUTH DAILY 90 tablet 1   • amLODIPine (NORVASC) 10 MG tablet TAKE ONE TABLET BY MOUTH DAILY 90 tablet 1   • aspirin 81 MG EC tablet Take 1 tablet by mouth Daily.     • benazepril (LOTENSIN) 20 MG tablet TAKE ONE TABLET BY MOUTH DAILY 90 tablet 1   • Dextromethorphan-guaiFENesin (MUCINEX DM PO) Take 1 tablet by mouth Daily.     • Eliquis 5 MG tablet tablet TAKE ONE TABLET BY MOUTH TWICE A  tablet 1   • fenofibrate (TRICOR) 48 MG tablet TAKE ONE TABLET BY MOUTH DAILY 90 tablet 1   • fluticasone (FLONASE) 50 MCG/ACT nasal spray 2 sprays into the nostril(s) as directed by provider Daily. 9.9 mL 11   • furosemide (LASIX) 20 MG tablet TAKE ONE TABLET BY MOUTH DAILY 90 tablet 1   • glimepiride (AMARYL) 4 MG tablet TAKE ONE TABLET BY MOUTH TWICE A  tablet 1   • glucose blood (FREESTYLE LITE) test strip Check blood sugar three times daily Dx E11.9 300 each 3   • HYDROcodone-acetaminophen (NORCO) 5-325 MG per tablet Take 1 tablet by mouth Daily As Needed for Moderate Pain. 15 tablet 0   • insulin NPH (HumuLIN N) 100 UNIT/ML injection Inject 14 units in am and 12 units in pm 6 mL 1   • Insulin Pen Needle (Pen Needles) 32G X 6 MM misc 1 each 2 (Two) Times a Day With Meals. Dx:  E11.9 100 each 1   • levocetirizine (XYZAL) 5 MG tablet Take 1 tablet by mouth Every Evening.     • levothyroxine (SYNTHROID, LEVOTHROID) 75 MCG tablet Take 1 tablet by mouth Daily. 90 tablet 1   • metFORMIN (GLUCOPHAGE) 500 MG tablet TAKE TWO TABLETS BY MOUTH EVERY MORNING AND TAKE ONE TABLET BY MOUTH EVERY EVENING 270 tablet 1   • metoprolol succinate XL (TOPROL-XL) 100 MG 24 hr tablet TAKE ONE TABLET BY MOUTH DAILY 90 tablet 1   •  montelukast (SINGULAIR) 10 MG tablet TAKE ONE TABLET BY MOUTH DAILY 90 tablet 3   • omeprazole (priLOSEC) 20 MG capsule Take 1 capsule by mouth Daily As Needed.     • prazosin (MINIPRESS) 2 MG capsule Take 1 capsule by mouth Every Night. 30 capsule 5   • rosuvastatin (CRESTOR) 5 MG tablet TAKE ONE TABLET BY MOUTH ONCE NIGHTLY 30 tablet 5     No current facility-administered medications on file prior to visit.          What are your concerns: CALLER IS HELPING PATIENT APPLY FOR ASSISTANCE WITH SOME OF HER MEDICATIONS AND NEEDS TO SPEAK WITH SOMEONE ON THE CLINICAL TEAM TO HELP HER COMPLETE THE APPLICATION AND POSSIBLY CHANGE THE CURRENT INSULIN TO A FLEX PEN   SHE WORKS WITH [employer]

## 2023-05-31 ENCOUNTER — CLINICAL SUPPORT (OUTPATIENT)
Dept: FAMILY MEDICINE CLINIC | Age: 83
End: 2023-05-31

## 2023-05-31 DIAGNOSIS — J30.9 ALLERGIC RHINITIS, UNSPECIFIED SEASONALITY, UNSPECIFIED TRIGGER: Primary | ICD-10-CM

## 2023-05-31 PROCEDURE — 95117 IMMUNOTHERAPY INJECTIONS: CPT | Performed by: FAMILY MEDICINE

## 2023-06-08 ENCOUNTER — CLINICAL SUPPORT (OUTPATIENT)
Dept: FAMILY MEDICINE CLINIC | Age: 83
End: 2023-06-08
Payer: MEDICARE

## 2023-06-08 DIAGNOSIS — J30.9 ALLERGIC RHINITIS, UNSPECIFIED SEASONALITY, UNSPECIFIED TRIGGER: Primary | ICD-10-CM

## 2023-06-08 PROCEDURE — 95117 IMMUNOTHERAPY INJECTIONS: CPT | Performed by: FAMILY MEDICINE

## 2023-06-09 ENCOUNTER — TELEPHONE (OUTPATIENT)
Dept: FAMILY MEDICINE CLINIC | Age: 83
End: 2023-06-09

## 2023-06-09 NOTE — TELEPHONE ENCOUNTER
Caller: VIVIANA    Relationship to patient: [facility] PHARMACY REPRESENTATIVE    Best call back number: [phone]    Patient is needing: CALLER CALLED TO VERIFY DIRECTIONS AND QUANTITY FOR THE PATIENTS Eliquis 5 MG tablet tablet? PLEASE CALL AS SOON AS POSSIBLE TO CLARIFY

## 2023-07-19 PROBLEM — E66.812 CLASS 2 SEVERE OBESITY DUE TO EXCESS CALORIES WITH SERIOUS COMORBIDITY IN ADULT: Status: ACTIVE | Noted: 2021-06-23

## 2023-07-25 RX ORDER — ALLOPURINOL 300 MG/1
TABLET ORAL
Qty: 90 TABLET | Refills: 1 | Status: SHIPPED | OUTPATIENT
Start: 2023-07-25

## 2023-08-01 ENCOUNTER — TELEPHONE (OUTPATIENT)
Dept: FAMILY MEDICINE CLINIC | Age: 83
End: 2023-08-01

## 2023-08-01 ENCOUNTER — TELEPHONE (OUTPATIENT)
Dept: FAMILY MEDICINE CLINIC | Age: 83
End: 2023-08-01
Payer: MEDICARE

## 2023-08-01 NOTE — TELEPHONE ENCOUNTER
Caller: Mary Chopra    Relationship: Self    Best call back number: [phone]     What is the best time to reach you: ANY    Who are you requesting to speak with (clinical staff, provider,  specific staff member): CLINICAL    What was the call regarding: PATIENT STATED THAT THAT SHE RECEIVED A TEXT THAT SHE NEEDED LABS DONE AND THAT SHE NEEDS TO FAST. PATIENT ASKED WHAT TIME DOES SHE NEED TO FAST FOR THE LABS?    Is it okay if the provider responds through MyChart: YES

## 2023-08-02 ENCOUNTER — CLINICAL SUPPORT (OUTPATIENT)
Dept: FAMILY MEDICINE CLINIC | Age: 83
End: 2023-08-02
Payer: MEDICARE

## 2023-08-02 ENCOUNTER — LAB (OUTPATIENT)
Dept: LAB | Facility: HOSPITAL | Age: 83
End: 2023-08-02
Payer: MEDICARE

## 2023-08-02 DIAGNOSIS — E03.9 ACQUIRED HYPOTHYROIDISM: ICD-10-CM

## 2023-08-02 DIAGNOSIS — E11.65 TYPE 2 DIABETES MELLITUS WITH HYPERGLYCEMIA, WITHOUT LONG-TERM CURRENT USE OF INSULIN: ICD-10-CM

## 2023-08-02 DIAGNOSIS — I10 ESSENTIAL HYPERTENSION: ICD-10-CM

## 2023-08-02 DIAGNOSIS — J30.9 ALLERGIC RHINITIS, UNSPECIFIED SEASONALITY, UNSPECIFIED TRIGGER: Primary | ICD-10-CM

## 2023-08-02 LAB
ALBUMIN SERPL-MCNC: 4.5 G/DL (ref 3.5–5.2)
ALBUMIN/GLOB SERPL: 1.8 G/DL
ALP SERPL-CCNC: 76 U/L (ref 39–117)
ALT SERPL W P-5'-P-CCNC: 16 U/L (ref 1–33)
ANION GAP SERPL CALCULATED.3IONS-SCNC: 13.7 MMOL/L (ref 5–15)
AST SERPL-CCNC: 24 U/L (ref 1–32)
BASOPHILS # BLD AUTO: 0.03 10*3/MM3 (ref 0–0.2)
BASOPHILS NFR BLD AUTO: 0.3 % (ref 0–1.5)
BILIRUB SERPL-MCNC: 0.3 MG/DL (ref 0–1.2)
BUN SERPL-MCNC: 14 MG/DL (ref 8–23)
BUN/CREAT SERPL: 14.3 (ref 7–25)
CALCIUM SPEC-SCNC: 10.2 MG/DL (ref 8.6–10.5)
CHLORIDE SERPL-SCNC: 105 MMOL/L (ref 98–107)
CHOLEST SERPL-MCNC: 150 MG/DL (ref 0–200)
CO2 SERPL-SCNC: 22.3 MMOL/L (ref 22–29)
CREAT SERPL-MCNC: 0.98 MG/DL (ref 0.57–1)
DEPRECATED RDW RBC AUTO: 47.4 FL (ref 37–54)
EGFRCR SERPLBLD CKD-EPI 2021: 58.1 ML/MIN/1.73
EOSINOPHIL # BLD AUTO: 1.51 10*3/MM3 (ref 0–0.4)
EOSINOPHIL NFR BLD AUTO: 17 % (ref 0.3–6.2)
ERYTHROCYTE [DISTWIDTH] IN BLOOD BY AUTOMATED COUNT: 14.7 % (ref 12.3–15.4)
GLOBULIN UR ELPH-MCNC: 2.5 GM/DL
GLUCOSE SERPL-MCNC: 178 MG/DL (ref 65–99)
HBA1C MFR BLD: 8.2 % (ref 4.8–5.6)
HCT VFR BLD AUTO: 41.9 % (ref 34–46.6)
HDLC SERPL-MCNC: 43 MG/DL (ref 40–60)
HGB BLD-MCNC: 13.2 G/DL (ref 12–15.9)
IMM GRANULOCYTES # BLD AUTO: 0.01 10*3/MM3 (ref 0–0.05)
IMM GRANULOCYTES NFR BLD AUTO: 0.1 % (ref 0–0.5)
LDLC SERPL CALC-MCNC: 83 MG/DL (ref 0–100)
LDLC/HDLC SERPL: 1.87 {RATIO}
LYMPHOCYTES # BLD AUTO: 2.89 10*3/MM3 (ref 0.7–3.1)
LYMPHOCYTES NFR BLD AUTO: 32.5 % (ref 19.6–45.3)
MCH RBC QN AUTO: 27.6 PG (ref 26.6–33)
MCHC RBC AUTO-ENTMCNC: 31.5 G/DL (ref 31.5–35.7)
MCV RBC AUTO: 87.7 FL (ref 79–97)
MONOCYTES # BLD AUTO: 0.54 10*3/MM3 (ref 0.1–0.9)
MONOCYTES NFR BLD AUTO: 6.1 % (ref 5–12)
NEUTROPHILS NFR BLD AUTO: 3.92 10*3/MM3 (ref 1.7–7)
NEUTROPHILS NFR BLD AUTO: 44 % (ref 42.7–76)
PLATELET # BLD AUTO: 164 10*3/MM3 (ref 140–450)
PMV BLD AUTO: 12.3 FL (ref 6–12)
POTASSIUM SERPL-SCNC: 4.4 MMOL/L (ref 3.5–5.2)
PROT SERPL-MCNC: 7 G/DL (ref 6–8.5)
RBC # BLD AUTO: 4.78 10*6/MM3 (ref 3.77–5.28)
SODIUM SERPL-SCNC: 141 MMOL/L (ref 136–145)
TRIGL SERPL-MCNC: 133 MG/DL (ref 0–150)
TSH SERPL DL<=0.05 MIU/L-ACNC: 1.6 UIU/ML (ref 0.27–4.2)
VLDLC SERPL-MCNC: 24 MG/DL (ref 5–40)
WBC NRBC COR # BLD: 8.9 10*3/MM3 (ref 3.4–10.8)

## 2023-08-02 PROCEDURE — 84443 ASSAY THYROID STIM HORMONE: CPT

## 2023-08-02 PROCEDURE — 83036 HEMOGLOBIN GLYCOSYLATED A1C: CPT

## 2023-08-02 PROCEDURE — 85025 COMPLETE CBC W/AUTO DIFF WBC: CPT

## 2023-08-02 PROCEDURE — 80061 LIPID PANEL: CPT

## 2023-08-02 PROCEDURE — 95117 IMMUNOTHERAPY INJECTIONS: CPT | Performed by: FAMILY MEDICINE

## 2023-08-02 PROCEDURE — 80053 COMPREHEN METABOLIC PANEL: CPT

## 2023-08-02 PROCEDURE — 36415 COLL VENOUS BLD VENIPUNCTURE: CPT

## 2023-08-07 RX ORDER — GLIMEPIRIDE 4 MG/1
TABLET ORAL
Qty: 180 TABLET | Refills: 1 | Status: SHIPPED | OUTPATIENT
Start: 2023-08-07

## 2023-08-17 ENCOUNTER — CLINICAL SUPPORT (OUTPATIENT)
Dept: FAMILY MEDICINE CLINIC | Age: 83
End: 2023-08-17
Payer: MEDICARE

## 2023-08-17 DIAGNOSIS — J30.9 ALLERGIC RHINITIS, UNSPECIFIED SEASONALITY, UNSPECIFIED TRIGGER: Primary | ICD-10-CM

## 2023-08-17 PROCEDURE — 95117 IMMUNOTHERAPY INJECTIONS: CPT | Performed by: FAMILY MEDICINE

## 2023-08-20 DIAGNOSIS — I10 ESSENTIAL HYPERTENSION: ICD-10-CM

## 2023-08-22 RX ORDER — METOPROLOL SUCCINATE 100 MG/1
TABLET, EXTENDED RELEASE ORAL
Qty: 90 TABLET | Refills: 1 | Status: SHIPPED | OUTPATIENT
Start: 2023-08-22

## 2023-09-08 ENCOUNTER — CLINICAL SUPPORT (OUTPATIENT)
Dept: FAMILY MEDICINE CLINIC | Age: 83
End: 2023-09-08
Payer: MEDICARE

## 2023-09-08 DIAGNOSIS — J30.9 ALLERGIC RHINITIS, UNSPECIFIED SEASONALITY, UNSPECIFIED TRIGGER: Primary | ICD-10-CM

## 2023-09-08 PROCEDURE — 95117 IMMUNOTHERAPY INJECTIONS: CPT | Performed by: FAMILY MEDICINE

## 2023-09-14 ENCOUNTER — HOSPITAL ENCOUNTER (OUTPATIENT)
Dept: BONE DENSITY | Facility: HOSPITAL | Age: 83
Discharge: HOME | End: 2023-09-14
Payer: MEDICARE

## 2023-09-14 ENCOUNTER — HOSPITAL ENCOUNTER (OUTPATIENT)
Dept: MAMMOGRAPHY | Facility: HOSPITAL | Age: 83
Discharge: HOME | End: 2023-09-14
Payer: MEDICARE

## 2023-09-14 ENCOUNTER — CLINICAL SUPPORT (OUTPATIENT)
Dept: FAMILY MEDICINE CLINIC | Age: 83
End: 2023-09-14
Payer: MEDICARE

## 2023-09-14 DIAGNOSIS — Z78.0 MENOPAUSE: ICD-10-CM

## 2023-09-14 DIAGNOSIS — Z12.31 SCREENING MAMMOGRAM, ENCOUNTER FOR: ICD-10-CM

## 2023-09-14 DIAGNOSIS — J30.9 ALLERGIC RHINITIS, UNSPECIFIED SEASONALITY, UNSPECIFIED TRIGGER: Primary | ICD-10-CM

## 2023-09-14 PROCEDURE — 77067 SCR MAMMO BI INCL CAD: CPT

## 2023-09-14 PROCEDURE — 77080 DXA BONE DENSITY AXIAL: CPT

## 2023-09-14 PROCEDURE — 95117 IMMUNOTHERAPY INJECTIONS: CPT | Performed by: FAMILY MEDICINE

## 2023-09-14 PROCEDURE — 77063 BREAST TOMOSYNTHESIS BI: CPT

## 2023-09-28 ENCOUNTER — CLINICAL SUPPORT (OUTPATIENT)
Dept: FAMILY MEDICINE CLINIC | Age: 83
End: 2023-09-28
Payer: MEDICARE

## 2023-09-28 DIAGNOSIS — J30.9 ALLERGIC RHINITIS, UNSPECIFIED SEASONALITY, UNSPECIFIED TRIGGER: Primary | ICD-10-CM

## 2023-09-28 PROCEDURE — 95117 IMMUNOTHERAPY INJECTIONS: CPT | Performed by: FAMILY MEDICINE

## 2023-10-04 ENCOUNTER — CLINICAL SUPPORT (OUTPATIENT)
Dept: FAMILY MEDICINE CLINIC | Age: 83
End: 2023-10-04
Payer: MEDICARE

## 2023-10-04 DIAGNOSIS — J30.9 ALLERGIC RHINITIS, UNSPECIFIED SEASONALITY, UNSPECIFIED TRIGGER: Primary | ICD-10-CM

## 2023-10-04 PROCEDURE — 95117 IMMUNOTHERAPY INJECTIONS: CPT | Performed by: FAMILY MEDICINE

## 2023-10-14 DIAGNOSIS — J30.1 ALLERGIC RHINITIS DUE TO POLLEN, UNSPECIFIED SEASONALITY: ICD-10-CM

## 2023-10-17 RX ORDER — FLUTICASONE PROPIONATE 50 MCG
2 SPRAY, SUSPENSION (ML) NASAL DAILY
Qty: 16 ML | Refills: 3 | Status: SHIPPED | OUTPATIENT
Start: 2023-10-17

## 2023-10-20 ENCOUNTER — CLINICAL SUPPORT (OUTPATIENT)
Dept: FAMILY MEDICINE CLINIC | Age: 83
End: 2023-10-20
Payer: MEDICARE

## 2023-10-20 DIAGNOSIS — J30.1 ALLERGIC RHINITIS DUE TO POLLEN, UNSPECIFIED SEASONALITY: Primary | ICD-10-CM

## 2023-10-20 PROCEDURE — 95117 IMMUNOTHERAPY INJECTIONS: CPT | Performed by: FAMILY MEDICINE

## 2023-10-23 RX ORDER — FENOFIBRATE 48 MG/1
TABLET, COATED ORAL
Qty: 90 TABLET | Refills: 1 | Status: SHIPPED | OUTPATIENT
Start: 2023-10-23

## 2023-10-29 DIAGNOSIS — E03.9 ACQUIRED HYPOTHYROIDISM: ICD-10-CM

## 2023-10-30 RX ORDER — LEVOTHYROXINE SODIUM 0.07 MG/1
75 TABLET ORAL DAILY
Qty: 90 TABLET | Refills: 1 | Status: SHIPPED | OUTPATIENT
Start: 2023-10-30

## 2023-11-13 RX ORDER — FUROSEMIDE 20 MG/1
TABLET ORAL
Qty: 90 TABLET | Refills: 1 | Status: SHIPPED | OUTPATIENT
Start: 2023-11-13

## 2024-01-02 ENCOUNTER — OFFICE VISIT (OUTPATIENT)
Dept: FAMILY MEDICINE CLINIC | Age: 84
End: 2024-01-02
Payer: MEDICARE

## 2024-01-02 ENCOUNTER — LAB (OUTPATIENT)
Dept: LAB | Facility: HOSPITAL | Age: 84
End: 2024-01-02
Payer: MEDICARE

## 2024-01-02 VITALS
HEART RATE: 67 BPM | WEIGHT: 206 LBS | DIASTOLIC BLOOD PRESSURE: 82 MMHG | SYSTOLIC BLOOD PRESSURE: 139 MMHG | BODY MASS INDEX: 38.89 KG/M2 | HEIGHT: 61 IN | OXYGEN SATURATION: 95 %

## 2024-01-02 DIAGNOSIS — E78.2 MIXED HYPERLIPIDEMIA: ICD-10-CM

## 2024-01-02 DIAGNOSIS — I27.20 PULMONARY HYPERTENSION: ICD-10-CM

## 2024-01-02 DIAGNOSIS — E03.9 ACQUIRED HYPOTHYROIDISM: ICD-10-CM

## 2024-01-02 DIAGNOSIS — I48.0 PAROXYSMAL ATRIAL FIBRILLATION: ICD-10-CM

## 2024-01-02 DIAGNOSIS — J42 CHRONIC BRONCHITIS, UNSPECIFIED CHRONIC BRONCHITIS TYPE: ICD-10-CM

## 2024-01-02 DIAGNOSIS — E11.65 TYPE 2 DIABETES MELLITUS WITH HYPERGLYCEMIA, WITHOUT LONG-TERM CURRENT USE OF INSULIN: ICD-10-CM

## 2024-01-02 DIAGNOSIS — I10 ESSENTIAL HYPERTENSION: ICD-10-CM

## 2024-01-02 DIAGNOSIS — E11.65 TYPE 2 DIABETES MELLITUS WITH HYPERGLYCEMIA, WITHOUT LONG-TERM CURRENT USE OF INSULIN: Primary | ICD-10-CM

## 2024-01-02 PROBLEM — S80.11XA HEMATOMA OF RIGHT LOWER LEG: Status: RESOLVED | Noted: 2022-11-08 | Resolved: 2024-01-02

## 2024-01-02 PROBLEM — E66.01 CLASS 2 SEVERE OBESITY DUE TO EXCESS CALORIES WITH SERIOUS COMORBIDITY IN ADULT: Status: RESOLVED | Noted: 2021-06-23 | Resolved: 2024-01-02

## 2024-01-02 PROBLEM — Z00.00 ANNUAL PHYSICAL EXAM: Status: RESOLVED | Noted: 2021-06-23 | Resolved: 2024-01-02

## 2024-01-02 PROBLEM — E66.812 CLASS 2 SEVERE OBESITY DUE TO EXCESS CALORIES WITH SERIOUS COMORBIDITY IN ADULT: Status: RESOLVED | Noted: 2021-06-23 | Resolved: 2024-01-02

## 2024-01-02 LAB
ALBUMIN SERPL-MCNC: 4.7 G/DL (ref 3.5–5.2)
ALBUMIN/GLOB SERPL: 1.7 G/DL
ALP SERPL-CCNC: 67 U/L (ref 39–117)
ALT SERPL W P-5'-P-CCNC: 22 U/L (ref 1–33)
ANION GAP SERPL CALCULATED.3IONS-SCNC: 13.6 MMOL/L (ref 5–15)
AST SERPL-CCNC: 29 U/L (ref 1–32)
BASOPHILS # BLD AUTO: 0.05 10*3/MM3 (ref 0–0.2)
BASOPHILS NFR BLD AUTO: 0.7 % (ref 0–1.5)
BILIRUB SERPL-MCNC: 0.3 MG/DL (ref 0–1.2)
BUN SERPL-MCNC: 18 MG/DL (ref 8–23)
BUN/CREAT SERPL: 18.4 (ref 7–25)
CALCIUM SPEC-SCNC: 10.6 MG/DL (ref 8.6–10.5)
CHLORIDE SERPL-SCNC: 102 MMOL/L (ref 98–107)
CHOLEST SERPL-MCNC: 183 MG/DL (ref 0–200)
CO2 SERPL-SCNC: 24.4 MMOL/L (ref 22–29)
CREAT SERPL-MCNC: 0.98 MG/DL (ref 0.57–1)
DEPRECATED RDW RBC AUTO: 45.2 FL (ref 37–54)
EGFRCR SERPLBLD CKD-EPI 2021: 57.7 ML/MIN/1.73
EOSINOPHIL # BLD AUTO: 0.35 10*3/MM3 (ref 0–0.4)
EOSINOPHIL NFR BLD AUTO: 4.8 % (ref 0.3–6.2)
ERYTHROCYTE [DISTWIDTH] IN BLOOD BY AUTOMATED COUNT: 14.5 % (ref 12.3–15.4)
GLOBULIN UR ELPH-MCNC: 2.8 GM/DL
GLUCOSE SERPL-MCNC: 135 MG/DL (ref 65–99)
HBA1C MFR BLD: 8.1 % (ref 4.8–5.6)
HCT VFR BLD AUTO: 41.5 % (ref 34–46.6)
HDLC SERPL-MCNC: 50 MG/DL (ref 40–60)
HGB BLD-MCNC: 13.7 G/DL (ref 12–15.9)
LDLC SERPL CALC-MCNC: 88 MG/DL (ref 0–100)
LDLC/HDLC SERPL: 1.58 {RATIO}
LYMPHOCYTES # BLD AUTO: 1.95 10*3/MM3 (ref 0.7–3.1)
LYMPHOCYTES NFR BLD AUTO: 26.9 % (ref 19.6–45.3)
MCH RBC QN AUTO: 28.4 PG (ref 26.6–33)
MCHC RBC AUTO-ENTMCNC: 33 G/DL (ref 31.5–35.7)
MCV RBC AUTO: 86.1 FL (ref 79–97)
MONOCYTES # BLD AUTO: 0.51 10*3/MM3 (ref 0.1–0.9)
MONOCYTES NFR BLD AUTO: 7 % (ref 5–12)
NEUTROPHILS NFR BLD AUTO: 4.37 10*3/MM3 (ref 1.7–7)
NEUTROPHILS NFR BLD AUTO: 60.3 % (ref 42.7–76)
PLATELET # BLD AUTO: 185 10*3/MM3 (ref 140–450)
PMV BLD AUTO: 13.2 FL (ref 6–12)
POTASSIUM SERPL-SCNC: 4.6 MMOL/L (ref 3.5–5.2)
PROT SERPL-MCNC: 7.5 G/DL (ref 6–8.5)
RBC # BLD AUTO: 4.82 10*6/MM3 (ref 3.77–5.28)
SODIUM SERPL-SCNC: 140 MMOL/L (ref 136–145)
TRIGL SERPL-MCNC: 269 MG/DL (ref 0–150)
TSH SERPL DL<=0.05 MIU/L-ACNC: 1.97 UIU/ML (ref 0.27–4.2)
VLDLC SERPL-MCNC: 45 MG/DL (ref 5–40)
WBC NRBC COR # BLD AUTO: 7.25 10*3/MM3 (ref 3.4–10.8)

## 2024-01-02 PROCEDURE — 84443 ASSAY THYROID STIM HORMONE: CPT

## 2024-01-02 PROCEDURE — 3075F SYST BP GE 130 - 139MM HG: CPT | Performed by: FAMILY MEDICINE

## 2024-01-02 PROCEDURE — 36415 COLL VENOUS BLD VENIPUNCTURE: CPT

## 2024-01-02 PROCEDURE — 80061 LIPID PANEL: CPT

## 2024-01-02 PROCEDURE — 1160F RVW MEDS BY RX/DR IN RCRD: CPT | Performed by: FAMILY MEDICINE

## 2024-01-02 PROCEDURE — 99214 OFFICE O/P EST MOD 30 MIN: CPT | Performed by: FAMILY MEDICINE

## 2024-01-02 PROCEDURE — 3079F DIAST BP 80-89 MM HG: CPT | Performed by: FAMILY MEDICINE

## 2024-01-02 PROCEDURE — 85025 COMPLETE CBC W/AUTO DIFF WBC: CPT

## 2024-01-02 PROCEDURE — 83036 HEMOGLOBIN GLYCOSYLATED A1C: CPT

## 2024-01-02 PROCEDURE — 80053 COMPREHEN METABOLIC PANEL: CPT

## 2024-01-02 PROCEDURE — 1159F MED LIST DOCD IN RCRD: CPT | Performed by: FAMILY MEDICINE

## 2024-01-02 RX ORDER — APIXABAN 5 MG/1
5 TABLET, FILM COATED ORAL 2 TIMES DAILY
Qty: 180 TABLET | Refills: 1 | Status: SHIPPED | OUTPATIENT
Start: 2024-01-02

## 2024-01-02 RX ORDER — METOPROLOL SUCCINATE 100 MG/1
100 TABLET, EXTENDED RELEASE ORAL DAILY
Qty: 90 TABLET | Refills: 1 | Status: SHIPPED | OUTPATIENT
Start: 2024-01-02

## 2024-01-02 RX ORDER — GLIMEPIRIDE 4 MG/1
4 TABLET ORAL 2 TIMES DAILY
Qty: 180 TABLET | Refills: 1 | Status: SHIPPED | OUTPATIENT
Start: 2024-01-02

## 2024-01-02 NOTE — PROGRESS NOTES
"Chief Complaint  Diabetes (6 month f/u)    Subjective          Mary Chopra presents to [facility] today for follow-up on routine issues.    She is on metformin, glimepiride and insulin NPH for diabetes.  She is on statin and an ACEI.  She is UTD on eye exam, last done 8/2023 by Dr. Cowan.      She is on amlodipine, benazepril, and metoprolol succinate for HTN.  No chest pain, palpitations or shortness of breath.  She also uses the metoprolol succinate for rate control of atrial fibrillation.  She is on Eliquis for anticoagulation.  She is on Lasix for pedal edema.  She is following with Cardiology.  They are also watching pulmonary hypertension.  BP at home has been \"fine.\"  However, she doesn't really check this often.  She has been trying to cut back on salt, \"but I probably eat more salt than I should.\"   She has been on prazosin in the past (headache).     She is on rosuvastatin and fenofibrate for HLD.  No myalgias.      She is on levothyroxine for hypothyroidism.  No cold/heat intolerance, no changes in hair or skin.     She is on omeprazole for GERD.  No heartburn or indigestion.     She is on montelukast, Flonase and azelastine for allergies and COPD/asthma.  Not currently on maintenance inhaler.      She is on allopurinol for gout.  No flares.      She has some chronic knee pain.  She usually uses Tylenol but does occasionally use a tab of Norco.  She keeps a small supply with #15 last prescribed 7/2023.  Last tox screen 7/2023.    She ambulates with a cane over any distance.  Status post PT but this was not effective.  MRI L-spine May 2022 showed degenerative changes.       Current Outpatient Medications:     allopurinol (ZYLOPRIM) 300 MG tablet, TAKE ONE TABLET BY MOUTH DAILY, Disp: 90 tablet, Rfl: 1    amLODIPine (NORVASC) 10 MG tablet, TAKE ONE TABLET BY MOUTH DAILY, Disp: 90 tablet, Rfl: 1    benazepril (LOTENSIN) 20 MG tablet, Take 1 tablet by mouth Daily., " Disp: 90 tablet, Rfl: 1    Dextromethorphan-guaiFENesin (MUCINEX DM PO), Take 1 tablet by mouth Daily., Disp: , Rfl:     Eliquis 5 MG tablet tablet, Take 1 tablet by mouth 2 (Two) Times a Day., Disp: 180 tablet, Rfl: 1    fenofibrate (TRICOR) 48 MG tablet, TAKE ONE TABLET BY MOUTH DAILY, Disp: 90 tablet, Rfl: 1    fluticasone (FLONASE) 50 MCG/ACT nasal spray, 2 sprays by Each Nare route Daily., Disp: 16 mL, Rfl: 3    furosemide (LASIX) 20 MG tablet, TAKE ONE TABLET BY MOUTH DAILY, Disp: 90 tablet, Rfl: 1    glimepiride (AMARYL) 4 MG tablet, Take 1 tablet by mouth 2 (Two) Times a Day., Disp: 180 tablet, Rfl: 1    glucose blood (FREESTYLE LITE) test strip, Check blood sugar three times daily Dx E11.9, Disp: 300 each, Rfl: 3    HYDROcodone-acetaminophen (NORCO) 5-325 MG per tablet, Take 1 tablet by mouth Daily As Needed for Moderate Pain., Disp: 15 tablet, Rfl: 0    insulin NPH (HumuLIN N) 100 UNIT/ML injection, Inject 14 units in am and 12 units in pm, Disp: 6 mL, Rfl: 1    Insulin Pen Needle (Pen Needles) 32G X 6 MM misc, 1 each 2 (Two) Times a Day With Meals. Dx:  E11.9, Disp: 100 each, Rfl: 1    levocetirizine (XYZAL) 5 MG tablet, Take 1 tablet by mouth Every Evening., Disp: , Rfl:     levothyroxine (SYNTHROID, LEVOTHROID) 75 MCG tablet, TAKE ONE TABLET BY MOUTH DAILY, Disp: 90 tablet, Rfl: 1    metFORMIN (GLUCOPHAGE) 500 MG tablet, Take 2 tablets PO QAM and 1 tablet QHS, Disp: 270 tablet, Rfl: 1    metoprolol succinate XL (TOPROL-XL) 100 MG 24 hr tablet, Take 1 tablet by mouth Daily., Disp: 90 tablet, Rfl: 1    montelukast (SINGULAIR) 10 MG tablet, TAKE ONE TABLET BY MOUTH DAILY, Disp: 90 tablet, Rfl: 3    omeprazole (priLOSEC) 20 MG capsule, Take 1 capsule by mouth Daily As Needed., Disp: , Rfl:     rosuvastatin (CRESTOR) 5 MG tablet, Take 1 tablet by mouth Every Night., Disp: 90 tablet, Rfl: 1  Medications Discontinued During This Encounter   Medication Reason    Eliquis 5 MG tablet tablet Reorder    metFORMIN  "(GLUCOPHAGE) 500 MG tablet Reorder    glimepiride (AMARYL) 4 MG tablet Reorder    metoprolol succinate XL (TOPROL-XL) 100 MG 24 hr tablet Reorder         Allergies:  Penicillins, Shellfish-derived products, Avelox [moxifloxacin], Banana, Keflex [cephalexin], Monopril [fosinopril], Serevent [salmeterol], Sulfa antibiotics, Talwin [pentazocine], and Claritin [loratadine]      Objective   Vital Signs:   Vitals:    01/02/24 1532 01/02/24 1608   BP: 171/80 139/82   BP Location: Left arm Left arm   Patient Position: Sitting Sitting   Cuff Size: Large Adult    Pulse: 62 67   SpO2: 95%    Weight: 93.4 kg (206 lb)    Height: 156.2 cm (61.5\")          Physical Exam  Vitals reviewed.   Constitutional:       General: She is not in acute distress.     Appearance: Normal appearance. She is well-developed.   HENT:      Head: Normocephalic and atraumatic.      Right Ear: External ear normal.      Left Ear: External ear normal.      Nose: Nose normal.      Mouth/Throat:      Mouth: Mucous membranes are moist.   Eyes:      Extraocular Movements: Extraocular movements intact.      Conjunctiva/sclera: Conjunctivae normal.      Pupils: Pupils are equal, round, and reactive to light.   Cardiovascular:      Rate and Rhythm: Normal rate. Rhythm irregularly irregular.      Heart sounds: No murmur heard.  Pulmonary:      Effort: Pulmonary effort is normal.      Breath sounds: Normal breath sounds. No wheezing, rhonchi or rales.   Abdominal:      General: Bowel sounds are normal. There is no distension.      Palpations: Abdomen is soft.      Tenderness: There is no abdominal tenderness.   Musculoskeletal:         General: Normal range of motion.   Neurological:      Mental Status: She is alert.   Psychiatric:         Mood and Affect: Affect normal.           Lab Results   Component Value Date    GLUCOSE 178 (H) 08/02/2023    BUN 14 08/02/2023    CREATININE 0.98 08/02/2023    EGFRIFNONA 72 06/23/2021    EGFRIFAFRI >60 12/09/2020    BCR 14.3 " 08/02/2023    K 4.4 08/02/2023    CO2 22.3 08/02/2023    CALCIUM 10.2 08/02/2023    ALBUMIN 4.5 08/02/2023    LABIL2 1.5 03/23/2021    AST 24 08/02/2023    ALT 16 08/02/2023       Lab Results   Component Value Date    CHOL 150 08/02/2023    CHLPL 213 (H) 03/23/2021    TRIG 133 08/02/2023    HDL 43 08/02/2023    LDL 83 08/02/2023       Lab Results   Component Value Date    WBC 8.90 08/02/2023    HGB 13.2 08/02/2023    HCT 41.9 08/02/2023    MCV 87.7 08/02/2023     08/02/2023            Assessment and Plan    Diagnoses and all orders for this visit:    1. Type 2 diabetes mellitus with hyperglycemia, without long-term current use of insulin (Primary)  Assessment & Plan:  She is on metformin, glimepiride and insulin NPH for diabetes.  Refills were nt needed.  She is on statin and an ACEI.  She is UTD on eye exam, last done 8/2023 by Dr. Cowan.  Checking labs.    Orders:  -     Comprehensive Metabolic Panel; Future  -     Lipid Panel; Future  -     Hemoglobin A1c; Future  -     glimepiride (AMARYL) 4 MG tablet; Take 1 tablet by mouth 2 (Two) Times a Day.  Dispense: 180 tablet; Refill: 1  -     metFORMIN (GLUCOPHAGE) 500 MG tablet; Take 2 tablets PO QAM and 1 tablet QHS  Dispense: 270 tablet; Refill: 1    2. Essential hypertension  Assessment & Plan:  BP is pretty elevated today although better on recheck.  She is pretty reluctant to add another BP med to her amlodipine 10 mg daily, benazepril 20 mg daily and metoprolol succinate 100 mg daily.  We discussed today about decreasing salt intake and being more consistent about taking BP at home so we can get a better idea of of what she is doing at home.    Orders:  -     CBC Auto Differential; Future  -     metoprolol succinate XL (TOPROL-XL) 100 MG 24 hr tablet; Take 1 tablet by mouth Daily.  Dispense: 90 tablet; Refill: 1    3. Mixed hyperlipidemia  Assessment & Plan:  Stable on rosuvastatin 5 mg daily.  Refills were needed today.  Labs were due today.  Continue  to monitor.        4. Paroxysmal atrial fibrillation  Assessment & Plan:  Stable on metoprolol succinate 100 mg daily for rate control and Eliquis 5 mg BID.  Refills sent as below.  Checking labs.    Orders:  -     Eliquis 5 MG tablet tablet; Take 1 tablet by mouth 2 (Two) Times a Day.  Dispense: 180 tablet; Refill: 1    5. Pulmonary hypertension  Overview:  As per HTN plan.      6. Acquired hypothyroidism  Assessment & Plan:  Stable on levothyroxine 75 mcg daily.  Refills were not needed today.  Labs were due today.  Continue to monitor.      Orders:  -     TSH; Future    7. Chronic bronchitis, unspecified chronic bronchitis type  Assessment & Plan:  Chronic bronchitis/asthma is currently being treated only with montelukast 10 mg daily and allergy medication.  Not currently requiring a maintenance inhaler.  Continue to monitor closely.          Follow Up   Return in about 6 months (around 7/2/2024) for Medicare Wellness.  Patient was given instructions and counseling regarding her condition or for health maintenance advice. Please see specific information pulled into the AVS if appropriate.           01/02/2024

## 2024-01-02 NOTE — ASSESSMENT & PLAN NOTE
She is on metformin, glimepiride and insulin NPH for diabetes.  Refills were nt needed.  She is on statin and an ACEI.  She is UTD on eye exam, last done 8/2023 by Dr. Cowan.  Checking labs.

## 2024-01-02 NOTE — ASSESSMENT & PLAN NOTE
Stable on rosuvastatin 5 mg daily.  Refills were needed today.  Labs were due today.  Continue to monitor.

## 2024-01-02 NOTE — ASSESSMENT & PLAN NOTE
Chronic bronchitis/asthma is currently being treated only with montelukast 10 mg daily and allergy medication.  Not currently requiring a maintenance inhaler.  Continue to monitor closely.

## 2024-01-02 NOTE — ASSESSMENT & PLAN NOTE
Stable on metoprolol succinate 100 mg daily for rate control and Eliquis 5 mg BID.  Refills sent as below.  Checking labs.

## 2024-01-02 NOTE — ASSESSMENT & PLAN NOTE
Stable on levothyroxine 75 mcg daily.  Refills were not needed today.  Labs were due today.  Continue to monitor.

## 2024-01-02 NOTE — ASSESSMENT & PLAN NOTE
BP is pretty elevated today although better on recheck.  She is pretty reluctant to add another BP med to her amlodipine 10 mg daily, benazepril 20 mg daily and metoprolol succinate 100 mg daily.  We discussed today about decreasing salt intake and being more consistent about taking BP at home so we can get a better idea of of what she is doing at home.

## 2024-01-05 RX ORDER — INSULIN HUMAN 100 [IU]/ML
INJECTION, SUSPENSION SUBCUTANEOUS
Start: 2024-01-05

## 2024-01-07 DIAGNOSIS — E11.65 TYPE 2 DIABETES MELLITUS WITH HYPERGLYCEMIA, WITHOUT LONG-TERM CURRENT USE OF INSULIN: ICD-10-CM

## 2024-01-07 DIAGNOSIS — I10 ESSENTIAL HYPERTENSION: ICD-10-CM

## 2024-01-08 RX ORDER — AMLODIPINE BESYLATE 10 MG/1
10 TABLET ORAL DAILY
Qty: 90 TABLET | Refills: 1 | Status: SHIPPED | OUTPATIENT
Start: 2024-01-08

## 2024-01-21 DIAGNOSIS — I10 ESSENTIAL HYPERTENSION: ICD-10-CM

## 2024-01-22 RX ORDER — BENAZEPRIL HYDROCHLORIDE 20 MG/1
20 TABLET ORAL DAILY
Qty: 90 TABLET | Refills: 1 | Status: SHIPPED | OUTPATIENT
Start: 2024-01-22

## 2024-01-23 RX ORDER — ALLOPURINOL 300 MG/1
300 TABLET ORAL DAILY
Qty: 90 TABLET | Refills: 1 | Status: SHIPPED | OUTPATIENT
Start: 2024-01-23

## 2024-01-27 DIAGNOSIS — E78.2 MIXED HYPERLIPIDEMIA: ICD-10-CM

## 2024-01-28 NOTE — ASSESSMENT & PLAN NOTE
Stable on current regimen.  Symptoms are well controlled.  No adverse effects. She does require ongoing use of this controlled substance to function.  Tox screen was due today.  Prior tox screen appropriate.  JULIANNE was run today.  Refills were needed today.  RTC 3 months.     Thelma Mccann MD

## 2024-01-29 RX ORDER — ROSUVASTATIN CALCIUM 5 MG/1
5 TABLET, COATED ORAL NIGHTLY
Qty: 90 TABLET | Refills: 1 | Status: SHIPPED | OUTPATIENT
Start: 2024-01-29

## 2024-02-06 ENCOUNTER — CLINICAL SUPPORT (OUTPATIENT)
Dept: FAMILY MEDICINE CLINIC | Age: 84
End: 2024-02-06
Payer: MEDICARE

## 2024-02-06 DIAGNOSIS — J30.9 ALLERGIC RHINITIS, UNSPECIFIED SEASONALITY, UNSPECIFIED TRIGGER: Primary | ICD-10-CM

## 2024-02-06 PROCEDURE — 95117 IMMUNOTHERAPY INJECTIONS: CPT | Performed by: FAMILY MEDICINE

## 2024-02-09 ENCOUNTER — CLINICAL SUPPORT (OUTPATIENT)
Dept: FAMILY MEDICINE CLINIC | Age: 84
End: 2024-02-09
Payer: MEDICARE

## 2024-02-09 DIAGNOSIS — J30.9 ALLERGIC RHINITIS, UNSPECIFIED SEASONALITY, UNSPECIFIED TRIGGER: Primary | ICD-10-CM

## 2024-02-09 PROCEDURE — 95117 IMMUNOTHERAPY INJECTIONS: CPT | Performed by: FAMILY MEDICINE

## 2024-02-13 ENCOUNTER — CLINICAL SUPPORT (OUTPATIENT)
Dept: FAMILY MEDICINE CLINIC | Age: 84
End: 2024-02-13
Payer: MEDICARE

## 2024-02-13 DIAGNOSIS — J30.9 ALLERGIC RHINITIS, UNSPECIFIED SEASONALITY, UNSPECIFIED TRIGGER: Primary | ICD-10-CM

## 2024-02-13 PROCEDURE — 95117 IMMUNOTHERAPY INJECTIONS: CPT | Performed by: FAMILY MEDICINE

## 2024-02-19 ENCOUNTER — CLINICAL SUPPORT (OUTPATIENT)
Dept: FAMILY MEDICINE CLINIC | Age: 84
End: 2024-02-19
Payer: MEDICARE

## 2024-02-19 DIAGNOSIS — J30.9 ALLERGIC RHINITIS, UNSPECIFIED SEASONALITY, UNSPECIFIED TRIGGER: Primary | ICD-10-CM

## 2024-02-19 PROCEDURE — 95117 IMMUNOTHERAPY INJECTIONS: CPT | Performed by: FAMILY MEDICINE

## 2024-02-23 ENCOUNTER — CLINICAL SUPPORT (OUTPATIENT)
Dept: FAMILY MEDICINE CLINIC | Age: 84
End: 2024-02-23
Payer: MEDICARE

## 2024-02-23 DIAGNOSIS — J30.9 ALLERGIC RHINITIS, UNSPECIFIED SEASONALITY, UNSPECIFIED TRIGGER: Primary | ICD-10-CM

## 2024-02-23 PROCEDURE — 95117 IMMUNOTHERAPY INJECTIONS: CPT | Performed by: FAMILY MEDICINE

## 2024-02-29 ENCOUNTER — CLINICAL SUPPORT (OUTPATIENT)
Dept: FAMILY MEDICINE CLINIC | Age: 84
End: 2024-02-29
Payer: MEDICARE

## 2024-02-29 DIAGNOSIS — J30.9 ALLERGIC RHINITIS, UNSPECIFIED SEASONALITY, UNSPECIFIED TRIGGER: Primary | ICD-10-CM

## 2024-02-29 PROCEDURE — 95117 IMMUNOTHERAPY INJECTIONS: CPT | Performed by: FAMILY MEDICINE

## 2024-03-04 RX ORDER — MONTELUKAST SODIUM 10 MG/1
TABLET ORAL
Qty: 90 TABLET | Refills: 0 | Status: SHIPPED | OUTPATIENT
Start: 2024-03-04

## 2024-03-11 ENCOUNTER — CLINICAL SUPPORT (OUTPATIENT)
Dept: FAMILY MEDICINE CLINIC | Age: 84
End: 2024-03-11
Payer: MEDICARE

## 2024-03-11 DIAGNOSIS — J30.9 ALLERGIC RHINITIS, UNSPECIFIED SEASONALITY, UNSPECIFIED TRIGGER: Primary | ICD-10-CM

## 2024-03-11 PROCEDURE — 95117 IMMUNOTHERAPY INJECTIONS: CPT | Performed by: FAMILY MEDICINE

## 2024-03-21 ENCOUNTER — CLINICAL SUPPORT (OUTPATIENT)
Dept: FAMILY MEDICINE CLINIC | Age: 84
End: 2024-03-21
Payer: MEDICARE

## 2024-03-21 DIAGNOSIS — J30.9 ALLERGIC RHINITIS, UNSPECIFIED SEASONALITY, UNSPECIFIED TRIGGER: Primary | ICD-10-CM

## 2024-03-21 PROCEDURE — 95117 IMMUNOTHERAPY INJECTIONS: CPT | Performed by: FAMILY MEDICINE

## 2024-04-02 ENCOUNTER — CLINICAL SUPPORT (OUTPATIENT)
Dept: FAMILY MEDICINE CLINIC | Age: 84
End: 2024-04-02
Payer: MEDICARE

## 2024-04-02 DIAGNOSIS — J30.9 ALLERGIC RHINITIS, UNSPECIFIED SEASONALITY, UNSPECIFIED TRIGGER: Primary | ICD-10-CM

## 2024-04-02 PROCEDURE — 95117 IMMUNOTHERAPY INJECTIONS: CPT | Performed by: FAMILY MEDICINE

## 2024-04-15 ENCOUNTER — CLINICAL SUPPORT (OUTPATIENT)
Dept: FAMILY MEDICINE CLINIC | Age: 84
End: 2024-04-15
Payer: MEDICARE

## 2024-04-15 DIAGNOSIS — J30.9 ALLERGIC RHINITIS, UNSPECIFIED SEASONALITY, UNSPECIFIED TRIGGER: Primary | ICD-10-CM

## 2024-04-15 PROCEDURE — 95117 IMMUNOTHERAPY INJECTIONS: CPT | Performed by: FAMILY MEDICINE

## 2024-04-15 RX ORDER — FENOFIBRATE 48 MG/1
48 TABLET, COATED ORAL DAILY
Qty: 90 TABLET | Refills: 1 | Status: SHIPPED | OUTPATIENT
Start: 2024-04-15

## 2024-04-22 RX ORDER — BLOOD-GLUCOSE METER
KIT MISCELLANEOUS
Qty: 300 EACH | Refills: 1 | Status: SHIPPED | OUTPATIENT
Start: 2024-04-22 | End: 2024-04-23 | Stop reason: SDUPTHER

## 2024-04-23 RX ORDER — BLOOD-GLUCOSE METER
KIT MISCELLANEOUS
Qty: 300 EACH | Refills: 1 | Status: SHIPPED | OUTPATIENT
Start: 2024-04-23

## 2024-04-25 ENCOUNTER — CLINICAL SUPPORT (OUTPATIENT)
Dept: FAMILY MEDICINE CLINIC | Age: 84
End: 2024-04-25
Payer: MEDICARE

## 2024-04-25 DIAGNOSIS — J30.9 ALLERGIC RHINITIS, UNSPECIFIED SEASONALITY, UNSPECIFIED TRIGGER: Primary | ICD-10-CM

## 2024-04-25 PROCEDURE — 95117 IMMUNOTHERAPY INJECTIONS: CPT | Performed by: FAMILY MEDICINE

## 2024-04-28 DIAGNOSIS — E03.9 ACQUIRED HYPOTHYROIDISM: ICD-10-CM

## 2024-04-29 RX ORDER — LEVOTHYROXINE SODIUM 0.07 MG/1
75 TABLET ORAL DAILY
Qty: 90 TABLET | Refills: 1 | Status: SHIPPED | OUTPATIENT
Start: 2024-04-29

## 2024-05-13 RX ORDER — FUROSEMIDE 20 MG/1
20 TABLET ORAL DAILY
Qty: 90 TABLET | Refills: 1 | Status: SHIPPED | OUTPATIENT
Start: 2024-05-13

## 2024-05-14 ENCOUNTER — CLINICAL SUPPORT (OUTPATIENT)
Dept: FAMILY MEDICINE CLINIC | Age: 84
End: 2024-05-14
Payer: MEDICARE

## 2024-05-14 DIAGNOSIS — J30.9 ALLERGIC RHINITIS, UNSPECIFIED SEASONALITY, UNSPECIFIED TRIGGER: Primary | ICD-10-CM

## 2024-05-14 PROCEDURE — 95117 IMMUNOTHERAPY INJECTIONS: CPT | Performed by: FAMILY MEDICINE

## 2024-05-23 ENCOUNTER — CLINICAL SUPPORT (OUTPATIENT)
Dept: FAMILY MEDICINE CLINIC | Age: 84
End: 2024-05-23
Payer: MEDICARE

## 2024-05-23 DIAGNOSIS — J30.9 ALLERGIC RHINITIS, UNSPECIFIED SEASONALITY, UNSPECIFIED TRIGGER: Primary | ICD-10-CM

## 2024-05-23 PROCEDURE — 95117 IMMUNOTHERAPY INJECTIONS: CPT | Performed by: FAMILY MEDICINE

## 2024-06-03 ENCOUNTER — CLINICAL SUPPORT (OUTPATIENT)
Dept: FAMILY MEDICINE CLINIC | Age: 84
End: 2024-06-03
Payer: MEDICARE

## 2024-06-03 DIAGNOSIS — J30.9 ALLERGIC RHINITIS, UNSPECIFIED SEASONALITY, UNSPECIFIED TRIGGER: Primary | ICD-10-CM

## 2024-06-03 PROCEDURE — 95117 IMMUNOTHERAPY INJECTIONS: CPT | Performed by: FAMILY MEDICINE

## 2024-06-03 RX ORDER — MONTELUKAST SODIUM 10 MG/1
TABLET ORAL
Qty: 90 TABLET | Refills: 0 | Status: SHIPPED | OUTPATIENT
Start: 2024-06-03

## 2024-06-10 ENCOUNTER — CLINICAL SUPPORT (OUTPATIENT)
Dept: FAMILY MEDICINE CLINIC | Age: 84
End: 2024-06-10
Payer: MEDICARE

## 2024-06-10 DIAGNOSIS — J30.9 ALLERGIC RHINITIS, UNSPECIFIED SEASONALITY, UNSPECIFIED TRIGGER: Primary | ICD-10-CM

## 2024-06-10 PROCEDURE — 95117 IMMUNOTHERAPY INJECTIONS: CPT | Performed by: FAMILY MEDICINE

## 2024-06-18 ENCOUNTER — CLINICAL SUPPORT (OUTPATIENT)
Dept: FAMILY MEDICINE CLINIC | Age: 84
End: 2024-06-18
Payer: MEDICARE

## 2024-06-18 DIAGNOSIS — J30.9 ALLERGIC RHINITIS, UNSPECIFIED SEASONALITY, UNSPECIFIED TRIGGER: Primary | ICD-10-CM

## 2024-06-18 PROCEDURE — 95117 IMMUNOTHERAPY INJECTIONS: CPT | Performed by: FAMILY MEDICINE

## 2024-06-28 ENCOUNTER — CLINICAL SUPPORT (OUTPATIENT)
Dept: FAMILY MEDICINE CLINIC | Age: 84
End: 2024-06-28
Payer: MEDICARE

## 2024-06-28 DIAGNOSIS — J30.9 ALLERGIC RHINITIS, UNSPECIFIED SEASONALITY, UNSPECIFIED TRIGGER: Primary | ICD-10-CM

## 2024-06-28 PROCEDURE — 95117 IMMUNOTHERAPY INJECTIONS: CPT | Performed by: FAMILY MEDICINE

## 2024-06-30 DIAGNOSIS — E11.65 TYPE 2 DIABETES MELLITUS WITH HYPERGLYCEMIA, WITHOUT LONG-TERM CURRENT USE OF INSULIN: ICD-10-CM

## 2024-07-06 DIAGNOSIS — I10 ESSENTIAL HYPERTENSION: ICD-10-CM

## 2024-07-10 RX ORDER — AMLODIPINE BESYLATE 10 MG/1
10 TABLET ORAL DAILY
Qty: 90 TABLET | Refills: 1 | Status: SHIPPED | OUTPATIENT
Start: 2024-07-10

## 2024-07-11 ENCOUNTER — CLINICAL SUPPORT (OUTPATIENT)
Dept: FAMILY MEDICINE CLINIC | Age: 84
End: 2024-07-11
Payer: MEDICARE

## 2024-07-11 DIAGNOSIS — J30.9 ALLERGIC RHINITIS, UNSPECIFIED SEASONALITY, UNSPECIFIED TRIGGER: Primary | ICD-10-CM

## 2024-07-11 PROCEDURE — 95117 IMMUNOTHERAPY INJECTIONS: CPT | Performed by: FAMILY MEDICINE

## 2024-07-14 DIAGNOSIS — I10 ESSENTIAL HYPERTENSION: ICD-10-CM

## 2024-07-15 RX ORDER — BENAZEPRIL HYDROCHLORIDE 20 MG/1
20 TABLET ORAL DAILY
Qty: 90 TABLET | Refills: 1 | Status: SHIPPED | OUTPATIENT
Start: 2024-07-15

## 2024-07-15 RX ORDER — ALLOPURINOL 300 MG/1
300 TABLET ORAL DAILY
Qty: 30 TABLET | Refills: 0 | Status: SHIPPED | OUTPATIENT
Start: 2024-07-15

## 2024-07-23 ENCOUNTER — OFFICE VISIT (OUTPATIENT)
Dept: FAMILY MEDICINE CLINIC | Age: 84
End: 2024-07-23
Payer: MEDICARE

## 2024-07-23 VITALS
TEMPERATURE: 97.9 F | OXYGEN SATURATION: 97 % | WEIGHT: 203 LBS | HEART RATE: 58 BPM | BODY MASS INDEX: 38.33 KG/M2 | SYSTOLIC BLOOD PRESSURE: 166 MMHG | DIASTOLIC BLOOD PRESSURE: 81 MMHG | HEIGHT: 61 IN

## 2024-07-23 DIAGNOSIS — Z00.00 ANNUAL PHYSICAL EXAM: Primary | ICD-10-CM

## 2024-07-23 DIAGNOSIS — Z79.899 HIGH RISK MEDICATION USE: ICD-10-CM

## 2024-07-23 DIAGNOSIS — K21.9 GASTROESOPHAGEAL REFLUX DISEASE WITHOUT ESOPHAGITIS: ICD-10-CM

## 2024-07-23 DIAGNOSIS — J42 CHRONIC BRONCHITIS, UNSPECIFIED CHRONIC BRONCHITIS TYPE: ICD-10-CM

## 2024-07-23 DIAGNOSIS — E11.65 TYPE 2 DIABETES MELLITUS WITH HYPERGLYCEMIA, WITHOUT LONG-TERM CURRENT USE OF INSULIN: ICD-10-CM

## 2024-07-23 DIAGNOSIS — R42 VERTIGO: ICD-10-CM

## 2024-07-23 DIAGNOSIS — J30.9 ALLERGIC RHINITIS, UNSPECIFIED SEASONALITY, UNSPECIFIED TRIGGER: ICD-10-CM

## 2024-07-23 DIAGNOSIS — I10 ESSENTIAL HYPERTENSION: ICD-10-CM

## 2024-07-23 DIAGNOSIS — E78.2 MIXED HYPERLIPIDEMIA: ICD-10-CM

## 2024-07-23 DIAGNOSIS — I48.0 PAROXYSMAL ATRIAL FIBRILLATION: ICD-10-CM

## 2024-07-23 DIAGNOSIS — M1A.09X0 IDIOPATHIC CHRONIC GOUT OF MULTIPLE SITES WITHOUT TOPHUS: ICD-10-CM

## 2024-07-23 DIAGNOSIS — I27.20 PULMONARY HYPERTENSION: ICD-10-CM

## 2024-07-23 DIAGNOSIS — E03.9 ACQUIRED HYPOTHYROIDISM: ICD-10-CM

## 2024-07-23 DIAGNOSIS — M54.41 CHRONIC RIGHT-SIDED LOW BACK PAIN WITH RIGHT-SIDED SCIATICA: ICD-10-CM

## 2024-07-23 DIAGNOSIS — G89.29 CHRONIC RIGHT-SIDED LOW BACK PAIN WITH RIGHT-SIDED SCIATICA: ICD-10-CM

## 2024-07-23 DIAGNOSIS — J45.40 MODERATE PERSISTENT ASTHMA WITHOUT COMPLICATION: ICD-10-CM

## 2024-07-23 PROBLEM — M1A.29X0 DRUG-INDUCED CHRONIC GOUT OF MULTIPLE SITES WITHOUT TOPHUS: Status: ACTIVE | Noted: 2024-07-23

## 2024-07-23 LAB
ALBUMIN UR-MCNC: 22.7 MG/DL
AMPHET+METHAMPHET UR QL: NEGATIVE
AMPHETAMINES UR QL: NEGATIVE
BARBITURATES UR QL SCN: NEGATIVE
BENZODIAZ UR QL SCN: NEGATIVE
BUPRENORPHINE SERPL-MCNC: NEGATIVE NG/ML
CANNABINOIDS SERPL QL: NEGATIVE
COCAINE UR QL: NEGATIVE
CREAT UR-MCNC: 19.8 MG/DL
EXPIRATION DATE: NORMAL
Lab: NORMAL
MDMA UR QL SCN: NEGATIVE
METHADONE UR QL SCN: NEGATIVE
MICROALBUMIN/CREAT UR: 1146.5 MG/G (ref 0–29)
MORPHINE/OPIATES SCREEN, URINE: NEGATIVE
OXYCODONE UR QL SCN: NEGATIVE
PCP UR QL SCN: NEGATIVE

## 2024-07-23 PROCEDURE — 82570 ASSAY OF URINE CREATININE: CPT | Performed by: FAMILY MEDICINE

## 2024-07-23 PROCEDURE — 95117 IMMUNOTHERAPY INJECTIONS: CPT | Performed by: FAMILY MEDICINE

## 2024-07-23 PROCEDURE — 1160F RVW MEDS BY RX/DR IN RCRD: CPT | Performed by: FAMILY MEDICINE

## 2024-07-23 PROCEDURE — G0439 PPPS, SUBSEQ VISIT: HCPCS | Performed by: FAMILY MEDICINE

## 2024-07-23 PROCEDURE — 80305 DRUG TEST PRSMV DIR OPT OBS: CPT | Performed by: FAMILY MEDICINE

## 2024-07-23 PROCEDURE — 1125F AMNT PAIN NOTED PAIN PRSNT: CPT | Performed by: FAMILY MEDICINE

## 2024-07-23 PROCEDURE — 3077F SYST BP >= 140 MM HG: CPT | Performed by: FAMILY MEDICINE

## 2024-07-23 PROCEDURE — 1159F MED LIST DOCD IN RCRD: CPT | Performed by: FAMILY MEDICINE

## 2024-07-23 PROCEDURE — 82043 UR ALBUMIN QUANTITATIVE: CPT | Performed by: FAMILY MEDICINE

## 2024-07-23 PROCEDURE — 1170F FXNL STATUS ASSESSED: CPT | Performed by: FAMILY MEDICINE

## 2024-07-23 PROCEDURE — 3078F DIAST BP <80 MM HG: CPT | Performed by: FAMILY MEDICINE

## 2024-07-23 RX ORDER — FLUTICASONE PROPIONATE AND SALMETEROL 250; 50 UG/1; UG/1
POWDER RESPIRATORY (INHALATION)
COMMUNITY
Start: 2024-06-10

## 2024-07-23 RX ORDER — ALBUTEROL SULFATE 90 UG/1
AEROSOL, METERED RESPIRATORY (INHALATION)
COMMUNITY
Start: 2024-06-10

## 2024-07-23 RX ORDER — MECLIZINE HCL 12.5 MG/1
12.5 TABLET ORAL 3 TIMES DAILY PRN
Qty: 30 TABLET | Refills: 0 | Status: SHIPPED | OUTPATIENT
Start: 2024-07-23

## 2024-07-23 RX ORDER — GLIMEPIRIDE 4 MG/1
4 TABLET ORAL 2 TIMES DAILY
Qty: 180 TABLET | Refills: 1 | Status: SHIPPED | OUTPATIENT
Start: 2024-07-23

## 2024-07-23 RX ORDER — METOPROLOL SUCCINATE 100 MG/1
100 TABLET, EXTENDED RELEASE ORAL DAILY
Qty: 90 TABLET | Refills: 1 | Status: SHIPPED | OUTPATIENT
Start: 2024-07-23

## 2024-07-23 RX ORDER — HYDROCODONE BITARTRATE AND ACETAMINOPHEN 5; 325 MG/1; MG/1
1 TABLET ORAL DAILY PRN
Qty: 15 TABLET | Refills: 0 | Status: SHIPPED | OUTPATIENT
Start: 2024-07-23

## 2024-07-23 RX ORDER — ROSUVASTATIN CALCIUM 5 MG/1
5 TABLET, COATED ORAL NIGHTLY
Qty: 90 TABLET | Refills: 1 | Status: SHIPPED | OUTPATIENT
Start: 2024-07-23

## 2024-07-23 RX ORDER — INSULIN HUMAN 100 [IU]/ML
INJECTION, SUSPENSION SUBCUTANEOUS
Start: 2024-07-23

## 2024-07-23 NOTE — ASSESSMENT & PLAN NOTE
Stable on current regimen.  Symptoms are well controlled.  No adverse effects. She does require ongoing use of this controlled substance to function.  Tox screen was due today.  Prior tox screen appropriate.  JULIANNE was run today.  Refills were needed today.  She uses about #15 tabs every year.  RTC 6 months.    Niacinamide Pregnancy And Lactation Text: These medications are considered safe during pregnancy.

## 2024-07-23 NOTE — ASSESSMENT & PLAN NOTE
Stable metoprolol succinate 100 mg daily for rate control and Eliquis 5 mg twice daily for anticoagulation.  Refills sent as below.  Checking labs as below.

## 2024-07-23 NOTE — ASSESSMENT & PLAN NOTE
Stable on rosuvastatin 5 mg daily and fenofibrate 48 mg daily.  Refills were needed today.  Labs were due today.  Continue to monitor.

## 2024-07-23 NOTE — ASSESSMENT & PLAN NOTE
Blood pressure has been running at goal when she checks it at home.  Continue amlodipine 10 mg daily, benazepril 20 mg daily and metoprolol succinate 100 mg daily.  Refills were needed today.  Labs were needed today.  Continue to monitor.

## 2024-07-23 NOTE — PROGRESS NOTES
Subjective   The ABCs of the Annual Wellness Visit  Medicare Wellness Visit      Mary Chopra is a 83 y.o. patient who presents for a Medicare Wellness Visit.    Colonoscopy is no longer indicated by age and history. Pap smear is no longer indicated by age and history.  Mammogram is no longer indicated by age and history. She is UTD on DEXA, last done 9/2023 and this showed osteopenia.  She is UTD on Pneumovax (2/2008), [id number] (9/2016), Zostavax (9/2012).  She is due for COVID, Prevnar 20, RSV, Shingrix and Td (2008).  Flu shot is not currently dictated.  She is due for routine labs including diabetes panel, TSH and CBC.      The following portions of the patient's history were reviewed and   updated as appropriate: allergies, current medications, past family history, past medical history, past social history, past surgical history, and problem list.    Compared to one year ago, the patient's physical   health is worse.  Compared to one year ago, the patient's mental   health is worse.    Recent Hospitalizations:  She was not admitted to the hospital during the last year.     Current Medical Providers:  Patient Care Team:  Sakina Donohue MD as PCP - General (Family Medicine)  Samir Rossi MD as Consulting Physician (Cardiology)  Linda Baumann MD as Consulting Physician (Dermatology)  Mg Cowan OD as Consulting Physician (Optometry)  Peewee Pierre MD as Consulting Physician (Allergy and Immunology)    Outpatient Medications Prior to Visit   Medication Sig Dispense Refill    albuterol sulfate  (90 Base) MCG/ACT inhaler       allopurinol (ZYLOPRIM) 300 MG tablet TAKE 1 TABLET BY MOUTH DAILY 30 tablet 0    amLODIPine (NORVASC) 10 MG tablet TAKE 1 TABLET BY MOUTH DAILY 90 tablet 1    benazepril (LOTENSIN) 20 MG tablet TAKE 1 TABLET BY MOUTH DAILY 90 tablet 1    Dextromethorphan-guaiFENesin (MUCINEX DM PO) Take 1 tablet by mouth Daily.      Eliquis 5 MG tablet tablet  Take 1 tablet by mouth 2 (Two) Times a Day. 180 tablet 1    fenofibrate (TRICOR) 48 MG tablet TAKE 1 TABLET BY MOUTH DAILY 90 tablet 1    fluticasone (FLONASE) 50 MCG/ACT nasal spray 2 sprays by Each Nare route Daily. 16 mL 3    Fluticasone-Salmeterol (ADVAIR/WIXELA) 250-50 MCG/ACT DISKUS       furosemide (LASIX) 20 MG tablet TAKE 1 TABLET BY MOUTH DAILY 90 tablet 1    glucose blood (FREESTYLE LITE) test strip USE TO TEST BLOOD SUGAR THREE TIMES A DAY ( or what ever is cover by insurance) DX E11.9 300 each 1    Insulin Pen Needle (Pen Needles) 32G X 6 MM misc 1 each 2 (Two) Times a Day With Meals. Dx:  E11.9 100 each 1    levocetirizine (XYZAL) 5 MG tablet Take 1 tablet by mouth Every Evening.      levothyroxine (SYNTHROID, LEVOTHROID) 75 MCG tablet TAKE 1 TABLET BY MOUTH DAILY 90 tablet 1    metFORMIN (GLUCOPHAGE) 500 MG tablet TAKE 2 TABLETS BY MOUTH EVERY MORNING AND TAKE 1 TABLET BY MOUTH EVERY NIGHT AT BEDTIME 270 tablet 1    montelukast (SINGULAIR) 10 MG tablet TAKE ONE TABLET BY MOUTH DAILY 90 tablet 0    omeprazole (priLOSEC) 20 MG capsule Take 1 capsule by mouth Daily As Needed.      glimepiride (AMARYL) 4 MG tablet Take 1 tablet by mouth 2 (Two) Times a Day. 180 tablet 1    insulin NPH (HumuLIN N) 100 UNIT/ML injection Inject 18 units SC in am and 14 units in pm      metoprolol succinate XL (TOPROL-XL) 100 MG 24 hr tablet Take 1 tablet by mouth Daily. 90 tablet 1    rosuvastatin (CRESTOR) 5 MG tablet TAKE ONE TABLET BY MOUTH ONCE NIGHTLY 90 tablet 1    HYDROcodone-acetaminophen (NORCO) 5-325 MG per tablet Take 1 tablet by mouth Daily As Needed for Moderate Pain. (Patient not taking: Reported on 7/23/2024) 15 tablet 0     No facility-administered medications prior to visit.     Opioid medication/s are on active medication list.  and I have evaluated her active treatment plan and pain score trends (see table).  There were no vitals filed for this visit.  I have reviewed the chart for potential of high risk  "medication and harmful drug interactions in the elderly.        Aspirin is not on active medication list.  Aspirin use is not indicated based on review of current medical condition/s. Risk of harm outweighs potential benefits.  .    Patient Active Problem List   Diagnosis    Anxiety    Generalized osteoarthritis    Moderate persistent asthma without complication    Gastroesophageal reflux disease without esophagitis    Essential hypertension    Mixed hyperlipidemia    Acquired hypothyroidism    Type 2 diabetes mellitus with hyperglycemia, without long-term current use of insulin    Paroxysmal atrial fibrillation    Pulmonary hypertension    Chronic pain of both knees    High risk medication use    Right hip pain    Chronic right-sided low back pain with right-sided sciatica    Chronic bronchitis    Idiopathic chronic gout of multiple sites without tophus     Advance Care Planning (Click this link to access ACP Navigator)  Advance Directive is not on file.  ACP discussion was held with the patient during this visit. Patient does not have an advance directive, information provided.        Objective   Vitals:    07/23/24 1247   BP: 158/43   BP Location: Left arm   Patient Position: Sitting   Cuff Size: Large Adult   Pulse: 57   Temp: 97.9 °F (36.6 °C)   TempSrc: Oral   SpO2: 97%   Weight: 92.1 kg (203 lb)   Height: 156.2 cm (61.5\")       Estimated body mass index is 37.74 kg/m² as calculated from the following:    Height as of this encounter: 156.2 cm (61.5\").    Weight as of this encounter: 92.1 kg (203 lb).    Class 2 Severe Obesity (BMI >=35 and <=39.9). Obesity-related health conditions include the following: hypertension, diabetes mellitus, and dyslipidemias. Obesity is improving with lifestyle modifications. BMI is is above average; BMI management plan is completed. We discussed portion control and increasing exercise.        Does the patient have evidence of cognitive impairment? No                               "                                                                  Health  Risk Assessment    Smoking Status:  Social History     Tobacco Use   Smoking Status Former    Current packs/day: 0.00    Types: Cigarettes    Quit date:     Years since quittin.5   Smokeless Tobacco Never     Alcohol Consumption:  Social History     Substance and Sexual Activity   Alcohol Use No     Fall Risk Screen:  MAICO Fall Risk Assessment was completed, and patient is at LOW risk for falls.Assessment completed on:2024    Depression Screenin/23/2024    12:54 PM   PHQ-2/PHQ-9 Depression Screening   Little Interest or Pleasure in Doing Things 0-->not at all   Feeling Down, Depressed or Hopeless 0-->not at all   PHQ-9: Brief Depression Severity Measure Score 0     Health Habits and Functional and Cognitive Screenin/23/2024    12:54 PM   Functional & Cognitive Status   Do you have difficulty preparing food and eating? No   Do you have difficulty bathing yourself, getting dressed or grooming yourself? No   Do you have difficulty using the toilet? No   Do you have difficulty moving around from place to place? Yes   Do you have trouble with steps or getting out of a bed or a chair? Yes   Current Diet Well Balanced Diet   Dental Exam Not up to date   Eye Exam Up to date   Exercise (times per week) 0 times per week   Current Exercises Include No Regular Exercise   Do you need help using the phone?  No   Are you deaf or do you have serious difficulty hearing?  Yes   Do you need help to go to places out of walking distance? Yes   Do you need help shopping? Yes   Do you need help preparing meals?  No   Do you need help with housework?  No   Do you need help with laundry? No   Do you need help taking your medications? No   Do you need help managing money? No   Do you ever drive or ride in a car without wearing a seat belt? No   Have you felt unusual stress, anger or loneliness in the last month? Yes   Who do you live  with? Alone   If you need help, do you have trouble finding someone available to you? No   Have you been bothered in the last four weeks by sexual problems? No   Do you have difficulty concentrating, remembering or making decisions? Yes             Age-appropriate Screening Schedule:  Refer to the list below for future screening recommendations based on patient's age, sex and/or medical conditions. Orders for these recommended tests are listed in the plan section. The patient has been provided with a written plan.    Health Maintenance List  Health Maintenance   Topic Date Due    RSV Vaccine - Adults (1 - 1-dose 60+ series) Never done    ZOSTER VACCINE (2 of 3) 10/27/2012    TDAP/TD VACCINES (2 - Tdap) 07/01/2018    URINE MICROALBUMIN  03/08/2024    HEMOGLOBIN A1C  07/02/2024    DIABETIC EYE EXAM  08/15/2024    COVID-19 Vaccine (4 - 2023-24 season) 07/25/2024 (Originally 9/1/2023)    INFLUENZA VACCINE  08/01/2024    LIPID PANEL  01/02/2025    ANNUAL WELLNESS VISIT  07/23/2025    BMI FOLLOWUP  07/23/2025    DXA SCAN  09/14/2025    Pneumococcal Vaccine 65+  Completed    COLORECTAL CANCER SCREENING  Discontinued                                                                                                                                                CMS Preventative Services Quick Reference  Risk Factors Identified During Encounter  Immunizations Discussed/Encouraged: Prevnar 20 (Pneumococcal 20-valent conjugate), Shingrix, COVID19, and RSV (Respiratory Syncytial Virus)    The above risks/problems have been discussed with the patient.  Pertinent information has been shared with the patient in the After Visit Summary.  An After Visit Summary and PPPS were made available to the patient.    Follow Up:   Next Medicare Wellness visit to be scheduled in 1 year.         Additional E&M Note during same encounter follows:  Patient has additional, significant, and separately identifiable condition(s)/problem(s) that require  "work above and beyond the Medicare Wellness Visit     Chief Complaint  Medicare Wellness-subsequent    Subjective   HPI  Mary is also being seen today for additional medical problem/s.    She has been experiencing a sensation of dizziness for the past 1.5-2 weeks.  \"It feels like two top wires touching together -- bzzzz!\"  She feels like her head is spinning.  Initially, she noticed it mostly in the afternoons, but now it is almost constant.  She feels better when she lies down, even if she rolls over in bed.  Sitting up brings it on.  No changes in hearing or new onset hearing loss.  She does suffer from allergies, but they have not necessarily been worse in the past couple of weeks.  She has noticed rhinorrhea.      She is on metformin, glimepiride and insulin NPH for diabetes.  Last A1c 8.1 Jan.  She is on statin and an ACEI.  She is UTD on eye exam, last done 8/2023 by Dr. Cowan.      She is on amlodipine, benazepril, and metoprolol succinate for hypertension.  Denies chest pain, palpitations or shortness of breath.  She is also on metoprolol succinate for rate control of atrial fib and Eliquis for anticoagulation.  She is on furosemide for pedal edema.  Follows with Cards.  +Pulmonary hypertension.  She has been on prazosin in the past (headache).     She is on rosuvastatin and fenofibrate for hyperlipidemia.  Denies myalgias.      She is on levothyroxine for hypothyroidism.  Denies cold/heat intolerance, changes in hair or skin.     She is on omeprazole for GERD.  Denies reflux or heartburn.     She is on montelukast, Flonase and azelastine for allergies and COPD/asthma.  Not currently on maintenance inhaler.      She is on allopurinol for gout.  Denies recent flares.      She is on Tylenol for chronic knee pain.  She keeps a small supply of Norco on hand, using #15 about every year.  She ambulates with a cane over significant distance.  PT was ineffective.  MRI L-spine May 2022 showed degenerative " "changes.      Review of Systems   Constitutional:  Positive for fatigue. Negative for chills and fever.   HENT:  Negative for congestion, hearing loss and rhinorrhea.    Eyes:  Negative for pain and visual disturbance.   Respiratory:  Negative for cough and shortness of breath.    Cardiovascular:  Positive for leg swelling (bilateral ankles). Negative for chest pain and palpitations.   Gastrointestinal:  Negative for abdominal pain, constipation, diarrhea, nausea and vomiting.   Genitourinary:  Negative for difficulty urinating and dysuria.   Musculoskeletal:  Positive for arthralgias and back pain. Negative for myalgias.   Neurological:  Positive for dizziness. Negative for weakness and numbness.   Psychiatric/Behavioral:  Negative for dysphoric mood and sleep disturbance. The patient is not nervous/anxious.         Objective   Vital Signs:  /43 (BP Location: Left arm, Patient Position: Sitting, Cuff Size: Large Adult)   Pulse 57   Temp 97.9 °F (36.6 °C) (Oral)   Ht 156.2 cm (61.5\")   Wt 92.1 kg (203 lb)   SpO2 97%   BMI 37.74 kg/m²   Physical Exam  Vitals reviewed.   Constitutional:       General: She is not in acute distress.     Appearance: Normal appearance. She is well-developed.   HENT:      Head: Normocephalic and atraumatic.      Right Ear: External ear normal.      Left Ear: External ear normal.      Mouth/Throat:      Mouth: Mucous membranes are moist.   Eyes:      Extraocular Movements: Extraocular movements intact.      Conjunctiva/sclera: Conjunctivae normal.      Pupils: Pupils are equal, round, and reactive to light.   Cardiovascular:      Rate and Rhythm: Normal rate and regular rhythm.      Heart sounds: No murmur heard.  Pulmonary:      Effort: Pulmonary effort is normal.      Breath sounds: Normal breath sounds. No wheezing, rhonchi or rales.   Abdominal:      General: Bowel sounds are normal. There is no distension.      Palpations: Abdomen is soft.      Tenderness: There is no " abdominal tenderness.   Musculoskeletal:         General: Normal range of motion.   Skin:     General: Skin is warm and dry.   Neurological:      Mental Status: She is alert and oriented to person, place, and time.      Deep Tendon Reflexes: Reflexes normal.   Psychiatric:         Mood and Affect: Mood and affect normal.         Behavior: Behavior normal.         Thought Content: Thought content normal.         Judgment: Judgment normal.     Lab Results   Component Value Date    GLUCOSE 135 (H) 01/02/2024    BUN 18 01/02/2024    CREATININE 0.98 01/02/2024    EGFR 57.7 (L) 01/02/2024    BCR 18.4 01/02/2024    K 4.6 01/02/2024    CO2 24.4 01/02/2024    CALCIUM 10.6 (H) 01/02/2024    ALBUMIN 4.7 01/02/2024    BILITOT 0.3 01/02/2024    AST 29 01/02/2024    ALT 22 01/02/2024       Lab Results   Component Value Date    CHOL 183 01/02/2024    CHLPL 213 (H) 03/23/2021    TRIG 269 (H) 01/02/2024    HDL 50 01/02/2024    LDL 88 01/02/2024       Lab Results   Component Value Date    WBC 7.25 01/02/2024    HGB 13.7 01/02/2024    HCT 41.5 01/02/2024    MCV 86.1 01/02/2024     01/02/2024     Lab Results   Component Value Date    HGBA1C 8.10 (H) 01/02/2024              Assessment and Plan               Annual physical exam  Colonoscopy is no longer indicated by age and history. Pap smear is no longer indicated by age and history.  Mammogram is no longer indicated by age and history. She is UTD on DEXA, last done 9/2023 and this showed osteopenia.  She is UTD on Pneumovax (2/2008), Ooewftq11 (9/2016), Zostavax (9/2012).  She is due for COVID, Prevnar 20, RSV, Shingrix and Td (2008).  Shingrix, RSV and Tdap can be done at the pharmacy.  COVID declined.  Flu shot is not currently dictated.  She is due for routine labs including diabetes panel, TSH and CBC; ordered.    Type 2 diabetes mellitus with hyperglycemia, without long-term current use of insulin  She is on metformin, glimepiride and insulin NPH for diabetes.  Refills  "sent.  Last A1c 8.1 Jan.  She is on statin and an ACEI.  She is UTD on eye exam, last done 8/2023 by Dr. Cowan; she will call to make this appointment.  Essential hypertension  Blood pressure has been running at goal when she checks it at home.  Continue amlodipine 10 mg daily, benazepril 20 mg daily and metoprolol succinate 100 mg daily.  Refills were needed today.  Labs were needed today.  Continue to monitor.   Mixed hyperlipidemia   Stable on rosuvastatin 5 mg daily and fenofibrate 48 mg daily.  Refills were needed today.  Labs were due today.  Continue to monitor.   Paroxysmal atrial fibrillation  Stable metoprolol succinate 100 mg daily for rate control and Eliquis 5 mg twice daily for anticoagulation.  Refills sent as below.  Checking labs as below.  Pulmonary hypertension  Following with Cardiology.  As per hypertension plan.  Acquired hypothyroidism  Stable on levothyroxine 75 mcg daily.  Refills were not needed today.  Labs were due today.  Continue to monitor.   Chronic bronchitis, unspecified chronic bronchitis type  Stable on Advair 250/50 mcg daily.  Refills were not needed today.  Continue to monitor.   Moderate persistent asthma without complication    As per chronic bronchitis plan.  Idiopathic chronic gout of multiple sites without tophus  Stable on allopurinol 300 mg daily.  Refills were not needed today.  Labs were due today.  Continue to monitor.   Gastroesophageal reflux disease without esophagitis  Stable on omeprazole 20 mg daily prn.  Refills were not needed today.  Continue to monitor.  Wean PPI as able.   Vertigo  Vertigo described as \"my head is spinning\" for the past 1.5-2 weeks.  Some fluid in the bilateral ears -- she has known seasonal allergies.  Increase Flonase to twice daily for the next 2 weeks, then go back to once weekly.  Trial of meclizine 12.5 mg every 8 hours.    Allergic rhinitis, unspecified seasonality, unspecified trigger  Allergy shot given today.  Chronic " right-sided low back pain with right-sided sciatica  Stable on current regimen.  Symptoms are well controlled.  No adverse effects. She does require ongoing use of this controlled substance to function.  Tox screen was due today.  Prior tox screen appropriate.  JULIANNE was run today.  Refills were needed today.  She uses about #15 tabs every year.  RTC 6 months.   High risk medication use      Orders Placed This Encounter   Procedures    Comprehensive Metabolic Panel     Standing Status:   Future     Standing Expiration Date:   7/23/2025     Order Specific Question:   Release to patient     Answer:   Routine Release [[id number]]    Lipid Panel     Standing Status:   Future     Standing Expiration Date:   7/23/2025     Order Specific Question:   Release to patient     Answer:   Routine Release [[id number]]    Hemoglobin A1c     Standing Status:   Future     Standing Expiration Date:   7/23/2025     Order Specific Question:   Release to patient     Answer:   Routine Release [[id number]]    Microalbumin / Creatinine Urine Ratio - Urine, Clean Catch     Standing Status:   Future     Standing Expiration Date:   7/23/2025     Order Specific Question:   Release to patient     Answer:   Routine Release [[id number]]    CBC Auto Differential     Standing Status:   Future     Standing Expiration Date:   7/23/2025     Order Specific Question:   Release to patient     Answer:   Routine Release [[id number]]    TSH     Standing Status:   Future     Order Specific Question:   Release to patient     Answer:   Routine Release [[id number]]    Uric Acid     Standing Status:   Future     Order Specific Question:   Release to patient     Answer:   Routine Release [[id number]]    Magnesium     Standing Status:   Future     Standing Expiration Date:   7/23/2025     Order Specific Question:   Release to patient     Answer:   Routine Release [[id number]]    POC Medline 12 Panel Urine Drug Screen     Order Specific Question:   Release to  patient     Answer:   Routine Release [[id number]]     New Medications Ordered This Visit   Medications    Allergy Serum Injection    Allergy Serum Injection    meclizine (ANTIVERT) 12.5 MG tablet     Sig: Take 1 tablet by mouth 3 (Three) Times a Day As Needed for Dizziness.     Dispense:  30 tablet     Refill:  0    glimepiride (AMARYL) 4 MG tablet     Sig: Take 1 tablet by mouth 2 (Two) Times a Day.     Dispense:  180 tablet     Refill:  1    insulin NPH (HumuLIN N) 100 UNIT/ML injection     Sig: Inject 18 units SC in am and 14 units in pm    metoprolol succinate XL (TOPROL-XL) 100 MG 24 hr tablet     Sig: Take 1 tablet by mouth Daily.     Dispense:  90 tablet     Refill:  1    rosuvastatin (CRESTOR) 5 MG tablet     Sig: Take 1 tablet by mouth Every Night.     Dispense:  90 tablet     Refill:  1    HYDROcodone-acetaminophen (NORCO) 5-325 MG per tablet     Sig: Take 1 tablet by mouth Daily As Needed for Moderate Pain.     Dispense:  15 tablet     Refill:  0          Follow Up   Return in about 6 months (around 1/23/2025) for Recheck.  Patient was given instructions and counseling regarding her condition or for health maintenance advice. Please see specific information pulled into the AVS if appropriate.

## 2024-07-23 NOTE — ASSESSMENT & PLAN NOTE
Stable on omeprazole 20 mg daily prn.  Refills were not needed today.  Continue to monitor.  Wean PPI as able.

## 2024-07-23 NOTE — ASSESSMENT & PLAN NOTE
She is on metformin, glimepiride and insulin NPH for diabetes.  Refills sent.  Last A1c 8.1 Jan.  She is on statin and an ACEI.  She is UTD on eye exam, last done 8/2023 by Dr. Cowan; she will call to make this appointment.

## 2024-07-23 NOTE — ASSESSMENT & PLAN NOTE
Stable on allopurinol 300 mg daily.  Refills were not needed today.  Labs were due today.  Continue to monitor.

## 2024-07-25 ENCOUNTER — LAB (OUTPATIENT)
Dept: LAB | Facility: HOSPITAL | Age: 84
End: 2024-07-25
Payer: MEDICARE

## 2024-07-25 DIAGNOSIS — I10 ESSENTIAL HYPERTENSION: ICD-10-CM

## 2024-07-25 DIAGNOSIS — E11.65 TYPE 2 DIABETES MELLITUS WITH HYPERGLYCEMIA, WITHOUT LONG-TERM CURRENT USE OF INSULIN: ICD-10-CM

## 2024-07-25 DIAGNOSIS — E03.9 ACQUIRED HYPOTHYROIDISM: ICD-10-CM

## 2024-07-25 DIAGNOSIS — M1A.09X0 IDIOPATHIC CHRONIC GOUT OF MULTIPLE SITES WITHOUT TOPHUS: ICD-10-CM

## 2024-07-25 LAB
ALBUMIN SERPL-MCNC: 4.3 G/DL (ref 3.5–5.2)
ALBUMIN/GLOB SERPL: 1.7 G/DL
ALP SERPL-CCNC: 65 U/L (ref 39–117)
ALT SERPL W P-5'-P-CCNC: 15 U/L (ref 1–33)
ANION GAP SERPL CALCULATED.3IONS-SCNC: 11.5 MMOL/L (ref 5–15)
AST SERPL-CCNC: 20 U/L (ref 1–32)
BASOPHILS # BLD AUTO: 0.02 10*3/MM3 (ref 0–0.2)
BASOPHILS NFR BLD AUTO: 0.2 % (ref 0–1.5)
BILIRUB SERPL-MCNC: 0.3 MG/DL (ref 0–1.2)
BUN SERPL-MCNC: 23 MG/DL (ref 8–23)
BUN/CREAT SERPL: 24.7 (ref 7–25)
CALCIUM SPEC-SCNC: 10.3 MG/DL (ref 8.6–10.5)
CHLORIDE SERPL-SCNC: 106 MMOL/L (ref 98–107)
CHOLEST SERPL-MCNC: 155 MG/DL (ref 0–200)
CO2 SERPL-SCNC: 23.5 MMOL/L (ref 22–29)
CREAT SERPL-MCNC: 0.93 MG/DL (ref 0.57–1)
DEPRECATED RDW RBC AUTO: 48.2 FL (ref 37–54)
EGFRCR SERPLBLD CKD-EPI 2021: 61.5 ML/MIN/1.73
EOSINOPHIL # BLD AUTO: 0.3 10*3/MM3 (ref 0–0.4)
EOSINOPHIL NFR BLD AUTO: 3 % (ref 0.3–6.2)
ERYTHROCYTE [DISTWIDTH] IN BLOOD BY AUTOMATED COUNT: 15.1 % (ref 12.3–15.4)
GLOBULIN UR ELPH-MCNC: 2.5 GM/DL
GLUCOSE SERPL-MCNC: 148 MG/DL (ref 65–99)
HBA1C MFR BLD: 7.4 % (ref 4.8–5.6)
HCT VFR BLD AUTO: 39.3 % (ref 34–46.6)
HDLC SERPL-MCNC: 46 MG/DL (ref 40–60)
HGB BLD-MCNC: 12.3 G/DL (ref 12–15.9)
IMM GRANULOCYTES # BLD AUTO: 0.01 10*3/MM3 (ref 0–0.05)
IMM GRANULOCYTES NFR BLD AUTO: 0.1 % (ref 0–0.5)
LDLC SERPL CALC-MCNC: 75 MG/DL (ref 0–100)
LDLC/HDLC SERPL: 1.48 {RATIO}
LYMPHOCYTES # BLD AUTO: 1.89 10*3/MM3 (ref 0.7–3.1)
LYMPHOCYTES NFR BLD AUTO: 18.9 % (ref 19.6–45.3)
MAGNESIUM SERPL-MCNC: 1.5 MG/DL (ref 1.6–2.4)
MCH RBC QN AUTO: 27.2 PG (ref 26.6–33)
MCHC RBC AUTO-ENTMCNC: 31.3 G/DL (ref 31.5–35.7)
MCV RBC AUTO: 86.9 FL (ref 79–97)
MONOCYTES # BLD AUTO: 0.55 10*3/MM3 (ref 0.1–0.9)
MONOCYTES NFR BLD AUTO: 5.5 % (ref 5–12)
NEUTROPHILS NFR BLD AUTO: 7.22 10*3/MM3 (ref 1.7–7)
NEUTROPHILS NFR BLD AUTO: 72.3 % (ref 42.7–76)
PLATELET # BLD AUTO: 174 10*3/MM3 (ref 140–450)
PMV BLD AUTO: 12.4 FL (ref 6–12)
POTASSIUM SERPL-SCNC: 4.6 MMOL/L (ref 3.5–5.2)
PROT SERPL-MCNC: 6.8 G/DL (ref 6–8.5)
RBC # BLD AUTO: 4.52 10*6/MM3 (ref 3.77–5.28)
SODIUM SERPL-SCNC: 141 MMOL/L (ref 136–145)
TRIGL SERPL-MCNC: 204 MG/DL (ref 0–150)
TSH SERPL DL<=0.05 MIU/L-ACNC: 1.6 UIU/ML (ref 0.27–4.2)
URATE SERPL-MCNC: 4.5 MG/DL (ref 2.4–5.7)
VLDLC SERPL-MCNC: 34 MG/DL (ref 5–40)
WBC NRBC COR # BLD AUTO: 9.99 10*3/MM3 (ref 3.4–10.8)

## 2024-07-25 PROCEDURE — 36415 COLL VENOUS BLD VENIPUNCTURE: CPT

## 2024-07-25 PROCEDURE — 80061 LIPID PANEL: CPT

## 2024-07-25 PROCEDURE — 84443 ASSAY THYROID STIM HORMONE: CPT

## 2024-07-25 PROCEDURE — 83735 ASSAY OF MAGNESIUM: CPT

## 2024-07-25 PROCEDURE — 83036 HEMOGLOBIN GLYCOSYLATED A1C: CPT

## 2024-07-25 PROCEDURE — 85025 COMPLETE CBC W/AUTO DIFF WBC: CPT

## 2024-07-25 PROCEDURE — 84550 ASSAY OF BLOOD/URIC ACID: CPT

## 2024-07-25 PROCEDURE — 80053 COMPREHEN METABOLIC PANEL: CPT

## 2024-08-20 RX ORDER — ALLOPURINOL 300 MG/1
300 TABLET ORAL DAILY
Qty: 90 TABLET | Refills: 1 | Status: SHIPPED | OUTPATIENT
Start: 2024-08-20

## 2024-08-21 ENCOUNTER — CLINICAL SUPPORT (OUTPATIENT)
Dept: FAMILY MEDICINE CLINIC | Age: 84
End: 2024-08-21
Payer: MEDICARE

## 2024-08-21 DIAGNOSIS — J30.9 ALLERGIC RHINITIS, UNSPECIFIED SEASONALITY, UNSPECIFIED TRIGGER: Primary | ICD-10-CM

## 2024-08-21 PROCEDURE — 95117 IMMUNOTHERAPY INJECTIONS: CPT | Performed by: FAMILY MEDICINE

## 2024-09-03 RX ORDER — MONTELUKAST SODIUM 10 MG/1
TABLET ORAL
Qty: 90 TABLET | Refills: 0 | Status: SHIPPED | OUTPATIENT
Start: 2024-09-03

## 2024-09-04 ENCOUNTER — CLINICAL SUPPORT (OUTPATIENT)
Dept: FAMILY MEDICINE CLINIC | Age: 84
End: 2024-09-04
Payer: MEDICARE

## 2024-09-04 DIAGNOSIS — J30.89 ALLERGIC RHINITIS DUE TO OTHER ALLERGIC TRIGGER, UNSPECIFIED SEASONALITY: Primary | ICD-10-CM

## 2024-09-04 PROCEDURE — 95117 IMMUNOTHERAPY INJECTIONS: CPT | Performed by: FAMILY MEDICINE

## 2024-09-12 ENCOUNTER — CLINICAL SUPPORT (OUTPATIENT)
Dept: FAMILY MEDICINE CLINIC | Age: 84
End: 2024-09-12
Payer: MEDICARE

## 2024-09-12 DIAGNOSIS — J30.89 ALLERGIC RHINITIS DUE TO OTHER ALLERGIC TRIGGER, UNSPECIFIED SEASONALITY: Primary | ICD-10-CM

## 2024-09-12 PROCEDURE — 95117 IMMUNOTHERAPY INJECTIONS: CPT | Performed by: FAMILY MEDICINE

## 2024-09-17 ENCOUNTER — CLINICAL SUPPORT (OUTPATIENT)
Dept: FAMILY MEDICINE CLINIC | Age: 84
End: 2024-09-17
Payer: MEDICARE

## 2024-09-17 ENCOUNTER — TELEPHONE (OUTPATIENT)
Dept: FAMILY MEDICINE CLINIC | Age: 84
End: 2024-09-17
Payer: MEDICARE

## 2024-09-17 DIAGNOSIS — Z12.31 ENCOUNTER FOR SCREENING MAMMOGRAM FOR BREAST CANCER: Primary | ICD-10-CM

## 2024-09-17 DIAGNOSIS — J30.89 ALLERGIC RHINITIS DUE TO OTHER ALLERGIC TRIGGER, UNSPECIFIED SEASONALITY: Primary | ICD-10-CM

## 2024-09-17 PROCEDURE — 95117 IMMUNOTHERAPY INJECTIONS: CPT | Performed by: FAMILY MEDICINE

## 2024-09-23 ENCOUNTER — CLINICAL SUPPORT (OUTPATIENT)
Dept: FAMILY MEDICINE CLINIC | Age: 84
End: 2024-09-23
Payer: MEDICARE

## 2024-09-23 DIAGNOSIS — J30.89 ALLERGIC RHINITIS DUE TO OTHER ALLERGIC TRIGGER, UNSPECIFIED SEASONALITY: Primary | ICD-10-CM

## 2024-09-23 PROCEDURE — 95117 IMMUNOTHERAPY INJECTIONS: CPT | Performed by: FAMILY MEDICINE

## 2024-10-01 ENCOUNTER — CLINICAL SUPPORT (OUTPATIENT)
Dept: FAMILY MEDICINE CLINIC | Age: 84
End: 2024-10-01
Payer: MEDICARE

## 2024-10-01 DIAGNOSIS — J30.89 ALLERGIC RHINITIS DUE TO OTHER ALLERGIC TRIGGER, UNSPECIFIED SEASONALITY: Primary | ICD-10-CM

## 2024-10-01 PROCEDURE — 95117 IMMUNOTHERAPY INJECTIONS: CPT | Performed by: FAMILY MEDICINE

## 2024-10-09 ENCOUNTER — CLINICAL SUPPORT (OUTPATIENT)
Dept: FAMILY MEDICINE CLINIC | Age: 84
End: 2024-10-09
Payer: MEDICARE

## 2024-10-09 DIAGNOSIS — J30.89 ALLERGIC RHINITIS DUE TO OTHER ALLERGIC TRIGGER, UNSPECIFIED SEASONALITY: Primary | ICD-10-CM

## 2024-10-09 PROCEDURE — 95117 IMMUNOTHERAPY INJECTIONS: CPT | Performed by: FAMILY MEDICINE

## 2024-10-14 RX ORDER — FENOFIBRATE 48 MG/1
48 TABLET, COATED ORAL DAILY
Qty: 90 TABLET | Refills: 1 | Status: SHIPPED | OUTPATIENT
Start: 2024-10-14

## 2024-10-18 ENCOUNTER — CLINICAL SUPPORT (OUTPATIENT)
Dept: FAMILY MEDICINE CLINIC | Age: 84
End: 2024-10-18
Payer: MEDICARE

## 2024-10-18 DIAGNOSIS — J30.89 ALLERGIC RHINITIS DUE TO OTHER ALLERGIC TRIGGER, UNSPECIFIED SEASONALITY: Primary | ICD-10-CM

## 2024-10-18 PROCEDURE — 95117 IMMUNOTHERAPY INJECTIONS: CPT | Performed by: FAMILY MEDICINE

## 2024-10-26 DIAGNOSIS — E03.9 ACQUIRED HYPOTHYROIDISM: ICD-10-CM

## 2024-10-28 RX ORDER — LEVOTHYROXINE SODIUM 75 UG/1
75 TABLET ORAL DAILY
Qty: 90 TABLET | Refills: 1 | Status: SHIPPED | OUTPATIENT
Start: 2024-10-28

## 2024-10-29 ENCOUNTER — CLINICAL SUPPORT (OUTPATIENT)
Dept: FAMILY MEDICINE CLINIC | Age: 84
End: 2024-10-29
Payer: MEDICARE

## 2024-10-29 DIAGNOSIS — J30.89 ALLERGIC RHINITIS DUE TO OTHER ALLERGIC TRIGGER, UNSPECIFIED SEASONALITY: Primary | ICD-10-CM

## 2024-10-29 PROCEDURE — 95117 IMMUNOTHERAPY INJECTIONS: CPT | Performed by: FAMILY MEDICINE

## 2024-11-11 RX ORDER — FUROSEMIDE 20 MG/1
20 TABLET ORAL DAILY
Qty: 90 TABLET | Refills: 1 | Status: SHIPPED | OUTPATIENT
Start: 2024-11-11

## 2024-11-22 ENCOUNTER — CLINICAL SUPPORT (OUTPATIENT)
Dept: FAMILY MEDICINE CLINIC | Age: 84
End: 2024-11-22
Payer: MEDICARE

## 2024-11-22 DIAGNOSIS — J30.89 ALLERGIC RHINITIS DUE TO OTHER ALLERGIC TRIGGER, UNSPECIFIED SEASONALITY: Primary | ICD-10-CM

## 2024-11-22 PROCEDURE — 95117 IMMUNOTHERAPY INJECTIONS: CPT | Performed by: FAMILY MEDICINE

## 2024-11-25 RX ORDER — MONTELUKAST SODIUM 10 MG/1
TABLET ORAL
Qty: 90 TABLET | Refills: 0 | Status: SHIPPED | OUTPATIENT
Start: 2024-11-25

## 2024-12-02 RX ORDER — BLOOD-GLUCOSE METER
KIT MISCELLANEOUS
Qty: 100 EACH | Refills: 3 | Status: SHIPPED | OUTPATIENT
Start: 2024-12-02

## 2024-12-05 ENCOUNTER — CLINICAL SUPPORT (OUTPATIENT)
Dept: FAMILY MEDICINE CLINIC | Age: 84
End: 2024-12-05
Payer: MEDICARE

## 2024-12-05 DIAGNOSIS — J30.89 ALLERGIC RHINITIS DUE TO OTHER ALLERGIC TRIGGER, UNSPECIFIED SEASONALITY: Primary | ICD-10-CM

## 2024-12-05 PROCEDURE — 95117 IMMUNOTHERAPY INJECTIONS: CPT | Performed by: FAMILY MEDICINE

## 2024-12-19 ENCOUNTER — CLINICAL SUPPORT (OUTPATIENT)
Dept: FAMILY MEDICINE CLINIC | Age: 84
End: 2024-12-19
Payer: MEDICARE

## 2024-12-19 DIAGNOSIS — J30.89 ALLERGIC RHINITIS DUE TO OTHER ALLERGIC TRIGGER, UNSPECIFIED SEASONALITY: Primary | ICD-10-CM

## 2024-12-19 PROCEDURE — 95117 IMMUNOTHERAPY INJECTIONS: CPT | Performed by: FAMILY MEDICINE

## 2024-12-28 DIAGNOSIS — I10 ESSENTIAL HYPERTENSION: ICD-10-CM

## 2024-12-28 DIAGNOSIS — E11.65 TYPE 2 DIABETES MELLITUS WITH HYPERGLYCEMIA, WITHOUT LONG-TERM CURRENT USE OF INSULIN: ICD-10-CM

## 2024-12-30 RX ORDER — AMLODIPINE BESYLATE 10 MG/1
10 TABLET ORAL DAILY
Qty: 90 TABLET | Refills: 1 | Status: SHIPPED | OUTPATIENT
Start: 2024-12-30

## 2025-01-02 ENCOUNTER — CLINICAL SUPPORT (OUTPATIENT)
Dept: FAMILY MEDICINE CLINIC | Age: 85
End: 2025-01-02
Payer: MEDICARE

## 2025-01-02 DIAGNOSIS — J30.89 ALLERGIC RHINITIS DUE TO OTHER ALLERGIC TRIGGER, UNSPECIFIED SEASONALITY: Primary | ICD-10-CM

## 2025-01-02 PROCEDURE — 95117 IMMUNOTHERAPY INJECTIONS: CPT | Performed by: FAMILY MEDICINE

## 2025-01-10 DIAGNOSIS — E11.65 TYPE 2 DIABETES MELLITUS WITH HYPERGLYCEMIA, WITHOUT LONG-TERM CURRENT USE OF INSULIN: ICD-10-CM

## 2025-01-10 RX ORDER — INSULIN HUMAN 100 [IU]/ML
INJECTION, SUSPENSION SUBCUTANEOUS
Qty: 30 ML | Refills: 0 | Status: SHIPPED | OUTPATIENT
Start: 2025-01-10

## 2025-01-11 DIAGNOSIS — I10 ESSENTIAL HYPERTENSION: ICD-10-CM

## 2025-01-14 RX ORDER — BENAZEPRIL HYDROCHLORIDE 20 MG/1
20 TABLET ORAL DAILY
Qty: 90 TABLET | Refills: 1 | Status: SHIPPED | OUTPATIENT
Start: 2025-01-14

## 2025-01-23 ENCOUNTER — OFFICE VISIT (OUTPATIENT)
Dept: FAMILY MEDICINE CLINIC | Age: 85
End: 2025-01-23
Payer: MEDICARE

## 2025-01-23 VITALS
DIASTOLIC BLOOD PRESSURE: 79 MMHG | BODY MASS INDEX: 38.33 KG/M2 | TEMPERATURE: 97.9 F | OXYGEN SATURATION: 96 % | SYSTOLIC BLOOD PRESSURE: 195 MMHG | HEART RATE: 53 BPM | WEIGHT: 203 LBS | HEIGHT: 61 IN

## 2025-01-23 DIAGNOSIS — I48.0 PAROXYSMAL ATRIAL FIBRILLATION: ICD-10-CM

## 2025-01-23 DIAGNOSIS — K21.9 GASTROESOPHAGEAL REFLUX DISEASE WITHOUT ESOPHAGITIS: ICD-10-CM

## 2025-01-23 DIAGNOSIS — E66.812 CLASS 2 SEVERE OBESITY DUE TO EXCESS CALORIES WITH SERIOUS COMORBIDITY AND BODY MASS INDEX (BMI) OF 37.0 TO 37.9 IN ADULT: ICD-10-CM

## 2025-01-23 DIAGNOSIS — E03.9 ACQUIRED HYPOTHYROIDISM: ICD-10-CM

## 2025-01-23 DIAGNOSIS — E11.65 TYPE 2 DIABETES MELLITUS WITH HYPERGLYCEMIA, WITHOUT LONG-TERM CURRENT USE OF INSULIN: Primary | ICD-10-CM

## 2025-01-23 DIAGNOSIS — J30.89 ALLERGIC RHINITIS DUE TO OTHER ALLERGIC TRIGGER, UNSPECIFIED SEASONALITY: ICD-10-CM

## 2025-01-23 DIAGNOSIS — I27.20 PULMONARY HYPERTENSION: ICD-10-CM

## 2025-01-23 DIAGNOSIS — E66.01 CLASS 2 SEVERE OBESITY DUE TO EXCESS CALORIES WITH SERIOUS COMORBIDITY AND BODY MASS INDEX (BMI) OF 37.0 TO 37.9 IN ADULT: ICD-10-CM

## 2025-01-23 DIAGNOSIS — J42 CHRONIC BRONCHITIS, UNSPECIFIED CHRONIC BRONCHITIS TYPE: ICD-10-CM

## 2025-01-23 DIAGNOSIS — E78.2 MIXED HYPERLIPIDEMIA: ICD-10-CM

## 2025-01-23 DIAGNOSIS — I10 ESSENTIAL HYPERTENSION: ICD-10-CM

## 2025-01-23 PROBLEM — Z79.899 HIGH RISK MEDICATION USE: Status: RESOLVED | Noted: 2021-11-02 | Resolved: 2025-01-23

## 2025-01-23 LAB — GLUCOSE BLDC GLUCOMTR-MCNC: 58 MG/DL (ref 70–130)

## 2025-01-23 PROCEDURE — 3077F SYST BP >= 140 MM HG: CPT | Performed by: FAMILY MEDICINE

## 2025-01-23 PROCEDURE — 95117 IMMUNOTHERAPY INJECTIONS: CPT | Performed by: FAMILY MEDICINE

## 2025-01-23 PROCEDURE — 1125F AMNT PAIN NOTED PAIN PRSNT: CPT | Performed by: FAMILY MEDICINE

## 2025-01-23 PROCEDURE — 1160F RVW MEDS BY RX/DR IN RCRD: CPT | Performed by: FAMILY MEDICINE

## 2025-01-23 PROCEDURE — 3078F DIAST BP <80 MM HG: CPT | Performed by: FAMILY MEDICINE

## 2025-01-23 PROCEDURE — 99214 OFFICE O/P EST MOD 30 MIN: CPT | Performed by: FAMILY MEDICINE

## 2025-01-23 PROCEDURE — 1159F MED LIST DOCD IN RCRD: CPT | Performed by: FAMILY MEDICINE

## 2025-01-23 PROCEDURE — 82948 REAGENT STRIP/BLOOD GLUCOSE: CPT | Performed by: FAMILY MEDICINE

## 2025-01-23 RX ORDER — ROSUVASTATIN CALCIUM 5 MG/1
5 TABLET, COATED ORAL NIGHTLY
Qty: 90 TABLET | Refills: 1 | Status: SHIPPED | OUTPATIENT
Start: 2025-01-23

## 2025-01-23 RX ORDER — METOPROLOL SUCCINATE 100 MG/1
100 TABLET, EXTENDED RELEASE ORAL DAILY
Qty: 90 TABLET | Refills: 1 | Status: SHIPPED | OUTPATIENT
Start: 2025-01-23

## 2025-01-23 NOTE — ASSESSMENT & PLAN NOTE
She is on metformin, glimepiride and insulin NPH for diabetes.  Last A1c 7.4 July.  She has had some hypoglycemic episodes.  I have recommended that we d/c her glimepiride and replace it with Ozempic instead.  Depending on how she responds, we could perhaps get her off the insulin NPH as well.  She is not 100% comfortable with this plan, however, and asks for some time to consider it.  She is on statin and an ACEI.  She is reportedly UTD on eye exam, last done 2024 by Dr. Cowan; records requested.  She is due for foot exam; exam today shows mildly decreased sensation.  Orders:    Comprehensive Metabolic Panel; Future    Lipid Panel; Future    Hemoglobin A1c; Future

## 2025-01-23 NOTE — ASSESSMENT & PLAN NOTE
Stable on levothyroxine 75 mcg daily.  Refills were not needed today.  Labs were due today.  Continue to monitor.  Orders:    TSH; Future

## 2025-01-23 NOTE — PROGRESS NOTES
Chief Complaint  Diabetes    Subjective          Mary Chopra presents to [facility] today for routine f/u of chronic issues.    She is on metformin, glimepiride and insulin NPH for diabetes.  Last A1c 7.4 July.  She has noticed some hypoglycemic episodes, down to 50s.  She is on statin and an ACEI.  She is reportedly UTD on eye exam, last done 2024 by Dr. Cowan.  She is due for foot exam.      She is on amlodipine, benazepril, and metoprolol succinate for HTN.  No chest pain, palpitations or shortness of breath.  She is also on metoprolol succinate for rate control of a-fib and Eliquis for anticoagulation.  She is on furosemide for pedal edema.  She follows with Cards.  +Pulmonary HTN.  She has been on prazosin in the past (headache).     She is on rosuvastatin and fenofibrate for HLD.  No myalgias.      She is on levothyroxine for hypothyroidism.  No cold/heat intolerance.  She has had very dry skin and has noticed increased hair loss.     She is on omeprazole for GERD.  No indigestion or reflux.     She is on Advair, montelukast, Flonase and azelastine for allergies and COPD/asthma.       She is on allopurinol for gout.  No recent flares.      She uses acetaminophen for chronic knee pain.  She keeps a small supply of Norco on hand, using #15 about every year.  She ambulates with a cane over significant distance.  PT was ineffective.       Current Outpatient Medications:     albuterol sulfate  (90 Base) MCG/ACT inhaler, , Disp: , Rfl:     allopurinol (ZYLOPRIM) 300 MG tablet, Take 1 tablet by mouth Daily., Disp: 90 tablet, Rfl: 1    amLODIPine (NORVASC) 10 MG tablet, TAKE 1 TABLET BY MOUTH DAILY, Disp: 90 tablet, Rfl: 1    benazepril (LOTENSIN) 20 MG tablet, TAKE 1 TABLET BY MOUTH DAILY, Disp: 90 tablet, Rfl: 1    Dextromethorphan-guaiFENesin (MUCINEX DM PO), Take 1 tablet by mouth Daily., Disp: , Rfl:     Eliquis 5 MG tablet tablet, Take 1 tablet by mouth 2 (Two)  Times a Day., Disp: 180 tablet, Rfl: 1    fenofibrate (TRICOR) 48 MG tablet, Take 1 tablet by mouth Daily., Disp: 90 tablet, Rfl: 1    fluticasone (FLONASE) 50 MCG/ACT nasal spray, 2 sprays by Each Nare route Daily., Disp: 16 mL, Rfl: 3    Fluticasone-Salmeterol (ADVAIR/WIXELA) 250-50 MCG/ACT DISKUS, , Disp: , Rfl:     furosemide (LASIX) 20 MG tablet, TAKE 1 TABLET BY MOUTH DAILY, Disp: 90 tablet, Rfl: 1    glimepiride (AMARYL) 4 MG tablet, Take 1 tablet by mouth 2 (Two) Times a Day., Disp: 180 tablet, Rfl: 1    glucose blood (FREESTYLE LITE) test strip, USE TO TEST BLOOD SUGAR THREE TIMES A DAY DX E11.65, Disp: 100 each, Rfl: 3    HYDROcodone-acetaminophen (NORCO) 5-325 MG per tablet, Take 1 tablet by mouth Daily As Needed for Moderate Pain., Disp: 15 tablet, Rfl: 0    Insulin NPH, Human,, Isophane, (HumuLIN N KwikPen) 100 UNIT/ML injection, Dial and inject 18 units SC in am and 14 units SC in evening.  Max dose is 32 units, Disp: 30 mL, Rfl: 0    Insulin Pen Needle (Pen Needles) 32G X 6 MM misc, 1 each 2 (Two) Times a Day With Meals. Dx:  E11.9, Disp: 100 each, Rfl: 1    levocetirizine (XYZAL) 5 MG tablet, Take 1 tablet by mouth Every Evening., Disp: , Rfl:     levothyroxine (SYNTHROID, LEVOTHROID) 75 MCG tablet, TAKE 1 TABLET BY MOUTH DAILY, Disp: 90 tablet, Rfl: 1    meclizine (ANTIVERT) 12.5 MG tablet, Take 1 tablet by mouth 3 (Three) Times a Day As Needed for Dizziness., Disp: 30 tablet, Rfl: 0    metFORMIN (GLUCOPHAGE) 500 MG tablet, TAKE 2 TABLETS BY MOUTH EVERY MORNING AND TAKE 1 TABLET BY MOUTH EVERY NIGHT AT BEDTIME, Disp: 270 tablet, Rfl: 1    metoprolol succinate XL (TOPROL-XL) 100 MG 24 hr tablet, Take 1 tablet by mouth Daily., Disp: 90 tablet, Rfl: 1    montelukast (SINGULAIR) 10 MG tablet, TAKE ONE TABLET BY MOUTH DAILY, Disp: 90 tablet, Rfl: 0    omeprazole (priLOSEC) 20 MG capsule, Take 1 capsule by mouth Daily As Needed., Disp: , Rfl:     rosuvastatin (CRESTOR) 5 MG tablet, Take 1 tablet by mouth  "Every Night., Disp: 90 tablet, Rfl: 1  Medications Discontinued During This Encounter   Medication Reason    metoprolol succinate XL (TOPROL-XL) 100 MG 24 hr tablet Reorder    rosuvastatin (CRESTOR) 5 MG tablet Reorder         Allergies:  Penicillins, Shellfish-derived products, Avelox [moxifloxacin], Banana, Keflex [cephalexin], Monopril [fosinopril], Serevent [salmeterol], Sulfa antibiotics, Talwin [pentazocine], and Claritin [loratadine]      Objective   Vital Signs:   Vitals:    01/23/25 1336   BP: (!) 182/73   BP Location: Right arm   Patient Position: Sitting   Cuff Size: Large Adult   Pulse: 53   Temp: 97.9 °F (36.6 °C)   TempSrc: Oral   SpO2: 96%   Weight: 92.1 kg (203 lb)   Height: 156.2 cm (61.5\")     Body mass index is 37.74 kg/m².           Physical Exam  Vitals reviewed.   Constitutional:       General: She is not in acute distress.     Appearance: Normal appearance. She is well-developed.   HENT:      Head: Normocephalic and atraumatic.      Right Ear: External ear normal.      Left Ear: External ear normal.   Eyes:      Extraocular Movements: Extraocular movements intact.      Conjunctiva/sclera: Conjunctivae normal.      Pupils: Pupils are equal, round, and reactive to light.   Cardiovascular:      Rate and Rhythm: Normal rate and regular rhythm.      Pulses:           Dorsalis pedis pulses are 2+ on the right side and 2+ on the left side.      Heart sounds: No murmur heard.  Pulmonary:      Effort: Pulmonary effort is normal.      Breath sounds: Normal breath sounds. No wheezing, rhonchi or rales.   Abdominal:      General: Bowel sounds are normal. There is no distension.      Palpations: Abdomen is soft.      Tenderness: There is no abdominal tenderness.   Musculoskeletal:         General: Normal range of motion.   Feet:      Right foot:      Protective Sensation: 3 sites tested.  2 sites sensed.      Skin integrity: Skin integrity normal. No ulcer or blister.      Toenail Condition: Right toenails " are normal.      Left foot:      Protective Sensation: 3 sites tested.  2 sites sensed.      Skin integrity: Skin integrity normal. No ulcer or blister.      Toenail Condition: Left toenails are normal.      Comments: Diabetic Foot Exam Performed and Monofilament Test Performed     Neurological:      Mental Status: She is alert.   Psychiatric:         Mood and Affect: Affect normal.         Lab Results   Component Value Date    GLUCOSE 148 (H) 07/25/2024    BUN 23 07/25/2024    CREATININE 0.93 07/25/2024    EGFRIFNONA 72 06/23/2021    EGFRIFAFRI >60 12/09/2020    BCR 24.7 07/25/2024    K 4.6 07/25/2024    CO2 23.5 07/25/2024    CALCIUM 10.3 07/25/2024    ALBUMIN 4.3 07/25/2024    LABIL2 1.5 03/23/2021    AST 20 07/25/2024    ALT 15 07/25/2024       Lab Results   Component Value Date    CHOL 155 07/25/2024    CHLPL 213 (H) 03/23/2021    TRIG 204 (H) 07/25/2024    HDL 46 07/25/2024    LDL 75 07/25/2024       Lab Results   Component Value Date    WBC 9.99 07/25/2024    HGB 12.3 07/25/2024    HCT 39.3 07/25/2024    MCV 86.9 07/25/2024     07/25/2024            Assessment and Plan    Assessment & Plan  Type 2 diabetes mellitus with hyperglycemia, without long-term current use of insulin    She is on metformin, glimepiride and insulin NPH for diabetes.  Last A1c 7.4 July.  She has had some hypoglycemic episodes.  I have recommended that we d/c her glimepiride and replace it with Ozempic instead.  Depending on how she responds, we could perhaps get her off the insulin NPH as well.  She is not 100% comfortable with this plan, however, and asks for some time to consider it.  She is on statin and an ACEI.  She is reportedly UTD on eye exam, last done 2024 by Dr. Cowan; records requested.  She is due for foot exam; exam today shows mildly decreased sensation.  Orders:    Comprehensive Metabolic Panel; Future    Lipid Panel; Future    Hemoglobin A1c; Future    Essential hypertension    Blood pressure has been running  at goal.  Continue amlodipine 10 mg daily, benazepril 20 mg daily, metoprolol succinate 100 mg daily.  Refills were needed today.  Labs were needed today.  Continue to monitor.  Orders:    metoprolol succinate XL (TOPROL-XL) 100 MG 24 hr tablet; Take 1 tablet by mouth Daily.    Mixed hyperlipidemia     Stable on rosuvastatin 5 mg daily.  Refills were needed today.  Labs were due today.  Continue to monitor.  Orders:    rosuvastatin (CRESTOR) 5 MG tablet; Take 1 tablet by mouth Every Night.    Paroxysmal atrial fibrillation  Stable metoprolol succinate 100 mg daily for rate control and Eliquis 5 mg twice daily for anticoagulation.  Refills sent as below.  Checking labs as below.        Pulmonary hypertension  As per HTN plan.       Class 2 severe obesity due to excess calories with serious comorbidity and body mass index (BMI) of 37.0 to 37.9 in adult  Patient's (Body mass index is 37.74 kg/m².) indicates that they are morbidly/severely obese (BMI > 40 or > 35 with obesity - related health condition) with health conditions that include hypertension, diabetes mellitus, and GERD . Weight is unchanged. BMI  is above average; BMI management plan is completed. We discussed portion control and increasing exercise.          Acquired hypothyroidism  Stable on levothyroxine 75 mcg daily.  Refills were not needed today.  Labs were due today.  Continue to monitor.  Orders:    TSH; Future    Chronic bronchitis, unspecified chronic bronchitis type  Stable on Advair 250/50 mcg daily.  Refills were not needed today.  Continue to monitor.        Gastroesophageal reflux disease without esophagitis  Stable on omeprazole 20 mg daily prn.  Refills were not needed today.  Continue to monitor.  Wean PPI as able.                 Follow Up   Return in about 6 months (around 7/23/2025) for Medicare Wellness.  Patient was given instructions and counseling regarding her condition or for health maintenance advice. Please see specific  information pulled into the AVS if appropriate.           01/23/2025

## 2025-01-23 NOTE — ASSESSMENT & PLAN NOTE
Stable on rosuvastatin 5 mg daily.  Refills were needed today.  Labs were due today.  Continue to monitor.  Orders:    rosuvastatin (CRESTOR) 5 MG tablet; Take 1 tablet by mouth Every Night.

## 2025-01-23 NOTE — ASSESSMENT & PLAN NOTE
Blood pressure has been running at goal.  Continue amlodipine 10 mg daily, benazepril 20 mg daily, metoprolol succinate 100 mg daily.  Refills were needed today.  Labs were needed today.  Continue to monitor.  Orders:    metoprolol succinate XL (TOPROL-XL) 100 MG 24 hr tablet; Take 1 tablet by mouth Daily.

## 2025-01-24 ENCOUNTER — LAB (OUTPATIENT)
Dept: LAB | Facility: HOSPITAL | Age: 85
End: 2025-01-24
Payer: MEDICARE

## 2025-01-24 DIAGNOSIS — E03.9 ACQUIRED HYPOTHYROIDISM: ICD-10-CM

## 2025-01-24 DIAGNOSIS — E11.65 TYPE 2 DIABETES MELLITUS WITH HYPERGLYCEMIA, WITHOUT LONG-TERM CURRENT USE OF INSULIN: ICD-10-CM

## 2025-01-24 LAB
ALBUMIN SERPL-MCNC: 4.3 G/DL (ref 3.5–5.2)
ALBUMIN/GLOB SERPL: 1.7 G/DL
ALP SERPL-CCNC: 67 U/L (ref 39–117)
ALT SERPL W P-5'-P-CCNC: 13 U/L (ref 1–33)
ANION GAP SERPL CALCULATED.3IONS-SCNC: 13 MMOL/L (ref 5–15)
AST SERPL-CCNC: 17 U/L (ref 1–32)
BILIRUB SERPL-MCNC: 0.2 MG/DL (ref 0–1.2)
BUN SERPL-MCNC: 15 MG/DL (ref 8–23)
BUN/CREAT SERPL: 16.9 (ref 7–25)
CALCIUM SPEC-SCNC: 10 MG/DL (ref 8.6–10.5)
CHLORIDE SERPL-SCNC: 103 MMOL/L (ref 98–107)
CHOLEST SERPL-MCNC: 142 MG/DL (ref 0–200)
CO2 SERPL-SCNC: 25 MMOL/L (ref 22–29)
CREAT SERPL-MCNC: 0.89 MG/DL (ref 0.57–1)
EGFRCR SERPLBLD CKD-EPI 2021: 64.4 ML/MIN/1.73
GLOBULIN UR ELPH-MCNC: 2.6 GM/DL
GLUCOSE SERPL-MCNC: 158 MG/DL (ref 65–99)
HBA1C MFR BLD: 6.8 % (ref 4.8–5.6)
HDLC SERPL-MCNC: 43 MG/DL (ref 40–60)
LDLC SERPL CALC-MCNC: 67 MG/DL (ref 0–100)
LDLC/HDLC SERPL: 1.41 {RATIO}
POTASSIUM SERPL-SCNC: 4.3 MMOL/L (ref 3.5–5.2)
PROT SERPL-MCNC: 6.9 G/DL (ref 6–8.5)
SODIUM SERPL-SCNC: 141 MMOL/L (ref 136–145)
TRIGL SERPL-MCNC: 192 MG/DL (ref 0–150)
TSH SERPL DL<=0.05 MIU/L-ACNC: 1.01 UIU/ML (ref 0.27–4.2)
VLDLC SERPL-MCNC: 32 MG/DL (ref 5–40)

## 2025-01-24 PROCEDURE — 36415 COLL VENOUS BLD VENIPUNCTURE: CPT

## 2025-01-24 PROCEDURE — 80061 LIPID PANEL: CPT

## 2025-01-24 PROCEDURE — 84443 ASSAY THYROID STIM HORMONE: CPT

## 2025-01-24 PROCEDURE — 80053 COMPREHEN METABOLIC PANEL: CPT

## 2025-01-24 PROCEDURE — 83036 HEMOGLOBIN GLYCOSYLATED A1C: CPT

## 2025-01-25 DIAGNOSIS — E78.2 MIXED HYPERLIPIDEMIA: ICD-10-CM

## 2025-01-27 RX ORDER — ROSUVASTATIN CALCIUM 5 MG/1
5 TABLET, COATED ORAL NIGHTLY
Qty: 90 TABLET | Refills: 1 | OUTPATIENT
Start: 2025-01-27

## 2025-01-29 RX ORDER — INSULIN HUMAN 100 [IU]/ML
INJECTION, SUSPENSION SUBCUTANEOUS
Qty: 30 ML | Refills: 0 | Status: SHIPPED | OUTPATIENT
Start: 2025-01-29

## 2025-02-01 DIAGNOSIS — E11.65 TYPE 2 DIABETES MELLITUS WITH HYPERGLYCEMIA, WITHOUT LONG-TERM CURRENT USE OF INSULIN: ICD-10-CM

## 2025-02-03 RX ORDER — GLIMEPIRIDE 4 MG/1
4 TABLET ORAL 2 TIMES DAILY
Qty: 180 TABLET | Refills: 1 | Status: SHIPPED | OUTPATIENT
Start: 2025-02-03

## 2025-02-05 ENCOUNTER — CLINICAL SUPPORT (OUTPATIENT)
Dept: FAMILY MEDICINE CLINIC | Age: 85
End: 2025-02-05
Payer: MEDICARE

## 2025-02-05 DIAGNOSIS — J30.89 ALLERGIC RHINITIS DUE TO OTHER ALLERGIC TRIGGER, UNSPECIFIED SEASONALITY: Primary | ICD-10-CM

## 2025-02-05 PROCEDURE — 95117 IMMUNOTHERAPY INJECTIONS: CPT | Performed by: INTERNAL MEDICINE

## 2025-02-10 RX ORDER — ALLOPURINOL 300 MG/1
300 TABLET ORAL DAILY
Qty: 90 TABLET | Refills: 1 | Status: SHIPPED | OUTPATIENT
Start: 2025-02-10

## 2025-02-13 ENCOUNTER — CLINICAL SUPPORT (OUTPATIENT)
Dept: FAMILY MEDICINE CLINIC | Age: 85
End: 2025-02-13
Payer: MEDICARE

## 2025-02-13 DIAGNOSIS — J30.89 ALLERGIC RHINITIS DUE TO OTHER ALLERGIC TRIGGER, UNSPECIFIED SEASONALITY: Primary | ICD-10-CM

## 2025-02-13 PROCEDURE — 95117 IMMUNOTHERAPY INJECTIONS: CPT | Performed by: FAMILY MEDICINE

## 2025-02-24 RX ORDER — MONTELUKAST SODIUM 10 MG/1
TABLET ORAL
Qty: 90 TABLET | Refills: 0 | Status: SHIPPED | OUTPATIENT
Start: 2025-02-24

## 2025-03-06 ENCOUNTER — CLINICAL SUPPORT (OUTPATIENT)
Dept: FAMILY MEDICINE CLINIC | Age: 85
End: 2025-03-06
Payer: MEDICARE

## 2025-03-06 DIAGNOSIS — J30.89 ALLERGIC RHINITIS DUE TO OTHER ALLERGIC TRIGGER, UNSPECIFIED SEASONALITY: Primary | ICD-10-CM

## 2025-03-06 PROCEDURE — 95117 IMMUNOTHERAPY INJECTIONS: CPT | Performed by: FAMILY MEDICINE

## 2025-03-14 ENCOUNTER — CLINICAL SUPPORT (OUTPATIENT)
Dept: FAMILY MEDICINE CLINIC | Age: 85
End: 2025-03-14
Payer: MEDICARE

## 2025-03-14 DIAGNOSIS — J30.89 ALLERGIC RHINITIS DUE TO OTHER ALLERGIC TRIGGER, UNSPECIFIED SEASONALITY: Primary | ICD-10-CM

## 2025-03-14 PROCEDURE — 95117 IMMUNOTHERAPY INJECTIONS: CPT | Performed by: FAMILY MEDICINE

## 2025-03-16 DIAGNOSIS — I48.0 PAROXYSMAL ATRIAL FIBRILLATION: ICD-10-CM

## 2025-03-18 RX ORDER — APIXABAN 5 MG/1
5 TABLET, FILM COATED ORAL 2 TIMES DAILY
Qty: 60 TABLET | Refills: 5 | Status: SHIPPED | OUTPATIENT
Start: 2025-03-18

## 2025-04-01 ENCOUNTER — CLINICAL SUPPORT (OUTPATIENT)
Dept: FAMILY MEDICINE CLINIC | Age: 85
End: 2025-04-01
Payer: MEDICARE

## 2025-04-01 DIAGNOSIS — J30.89 ALLERGIC RHINITIS DUE TO OTHER ALLERGIC TRIGGER, UNSPECIFIED SEASONALITY: Primary | ICD-10-CM

## 2025-04-14 RX ORDER — FENOFIBRATE 48 MG/1
48 TABLET, COATED ORAL DAILY
Qty: 90 TABLET | Refills: 1 | Status: SHIPPED | OUTPATIENT
Start: 2025-04-14

## 2025-04-21 ENCOUNTER — CLINICAL SUPPORT (OUTPATIENT)
Dept: FAMILY MEDICINE CLINIC | Age: 85
End: 2025-04-21
Payer: MEDICARE

## 2025-04-21 DIAGNOSIS — J30.89 ALLERGIC RHINITIS DUE TO OTHER ALLERGIC TRIGGER, UNSPECIFIED SEASONALITY: Primary | ICD-10-CM

## 2025-04-21 PROCEDURE — 95117 IMMUNOTHERAPY INJECTIONS: CPT | Performed by: INTERNAL MEDICINE

## 2025-04-27 DIAGNOSIS — E03.9 ACQUIRED HYPOTHYROIDISM: ICD-10-CM

## 2025-04-28 RX ORDER — LEVOTHYROXINE SODIUM 75 UG/1
75 TABLET ORAL DAILY
Qty: 90 TABLET | Refills: 1 | Status: SHIPPED | OUTPATIENT
Start: 2025-04-28

## 2025-04-30 ENCOUNTER — CLINICAL SUPPORT (OUTPATIENT)
Dept: FAMILY MEDICINE CLINIC | Age: 85
End: 2025-04-30
Payer: MEDICARE

## 2025-04-30 DIAGNOSIS — J30.89 ALLERGIC RHINITIS DUE TO OTHER ALLERGIC TRIGGER, UNSPECIFIED SEASONALITY: Primary | ICD-10-CM

## 2025-04-30 PROCEDURE — 95117 IMMUNOTHERAPY INJECTIONS: CPT | Performed by: FAMILY MEDICINE

## 2025-05-06 RX ORDER — INSULIN HUMAN 100 [IU]/ML
INJECTION, SUSPENSION SUBCUTANEOUS
Qty: 30 ML | Refills: 0 | Status: SHIPPED | OUTPATIENT
Start: 2025-05-06

## 2025-05-11 DIAGNOSIS — E11.65 TYPE 2 DIABETES MELLITUS WITH HYPERGLYCEMIA, WITHOUT LONG-TERM CURRENT USE OF INSULIN: ICD-10-CM

## 2025-05-12 RX ORDER — FUROSEMIDE 20 MG/1
20 TABLET ORAL DAILY
Qty: 90 TABLET | Refills: 1 | Status: SHIPPED | OUTPATIENT
Start: 2025-05-12

## 2025-05-20 ENCOUNTER — CLINICAL SUPPORT (OUTPATIENT)
Dept: FAMILY MEDICINE CLINIC | Age: 85
End: 2025-05-20
Payer: MEDICARE

## 2025-05-20 DIAGNOSIS — J30.89 ALLERGIC RHINITIS DUE TO OTHER ALLERGIC TRIGGER, UNSPECIFIED SEASONALITY: Primary | ICD-10-CM

## 2025-05-20 PROCEDURE — 95117 IMMUNOTHERAPY INJECTIONS: CPT | Performed by: FAMILY MEDICINE

## 2025-05-27 RX ORDER — MONTELUKAST SODIUM 10 MG/1
10 TABLET ORAL DAILY
Qty: 90 TABLET | Refills: 0 | Status: SHIPPED | OUTPATIENT
Start: 2025-05-27

## 2025-05-27 RX ORDER — BLOOD-GLUCOSE METER
1 KIT MISCELLANEOUS 3 TIMES DAILY
Qty: 300 EACH | Refills: 0 | Status: SHIPPED | OUTPATIENT
Start: 2025-05-27

## 2025-05-29 ENCOUNTER — OFFICE VISIT (OUTPATIENT)
Dept: FAMILY MEDICINE CLINIC | Age: 85
End: 2025-05-29
Payer: MEDICARE

## 2025-05-29 VITALS
DIASTOLIC BLOOD PRESSURE: 54 MMHG | TEMPERATURE: 99.3 F | BODY MASS INDEX: 36.93 KG/M2 | HEART RATE: 63 BPM | HEIGHT: 61 IN | OXYGEN SATURATION: 95 % | WEIGHT: 195.6 LBS | SYSTOLIC BLOOD PRESSURE: 150 MMHG

## 2025-05-29 DIAGNOSIS — K04.7 DENTAL ABSCESS: Primary | ICD-10-CM

## 2025-05-29 DIAGNOSIS — I10 ESSENTIAL HYPERTENSION: ICD-10-CM

## 2025-05-29 RX ORDER — CLINDAMYCIN HYDROCHLORIDE 300 MG/1
300 CAPSULE ORAL 4 TIMES DAILY
Qty: 40 CAPSULE | Refills: 0 | Status: SHIPPED | OUTPATIENT
Start: 2025-05-29

## 2025-05-29 NOTE — ASSESSMENT & PLAN NOTE
Blood pressure elevated today at 154/48 initially, rechecked prior to leaving the office it was 150/54.  Continue taking metoprolol, furosemide, benazepril and amlodipine as directed.  Monitor blood pressure at home.  Follow-up with PCP.

## 2025-05-29 NOTE — PROGRESS NOTES
Chief Complaint     Swollen Glands (Swollen glands left side, can barely open mouth due to pain. Back teeth pain. X2-3 days)    History of Present Illness     Mary Chopra is a 84 y.o. female who presents to [facility].     Patient or patient representative verbalized consent for the use of Ambient Listening during the visit with  JOCELYN Ventura for chart documentation. 5/29/2025  11:37 EDT      History of Present Illness  The patient is an 84-year-old female who presents for evaluation of back teeth pain.    She reports experiencing discomfort in her posterior teeth, which began on Tuesday, accompanied by jaw pain and significant swelling of the submandibular glands. The severity of the swelling has resulted in difficulty opening her mouth sufficiently to consume food. She also mentions that she has not had a dental consultation in several years. She recalls attending her great-granddaughter's birthday party on Sunday, during which she felt unusually cold throughout the day. This was followed by a day of excessive sleep on Monday. She has been experiencing night sweats, resulting in soaked bedding, and intermittent fevers, although her temperature readings have consistently indicated only a low-grade fever. She lost feeling of the inner tooth about 1.5 weeks ago and almost immediately a week after that she lost feeling out of the back tooth. They did not hurt until the last couple of days. She is aware that dental treatment is necessary but understands that it cannot proceed until the infection is resolved with antibiotics.    Blood pressure elevated today at 154/48 initially, rechecked prior to leaving the office it was 150/54.    FAMILY HISTORY  Her daughter and grandchildren have allergies.         History      Past Medical History:   Diagnosis Date    Acute kidney failure, unspecified     Age-related cognitive decline     Allergic rhinitis, unspecified     Anxiety  disorder, unspecified     Cataract     bilateral - declines surgery     COPD (chronic obstructive pulmonary disease)     borderline    COVID-19     DDD (degenerative disc disease), lumbar     Diabetes mellitus     insulin and 2 oral meds    Disease of thyroid gland     Diverticulitis of large intestine without perforation or abscess without bleeding     Esophageal ulcer     Gastritis     GERD (gastroesophageal reflux disease)     Gluteal tendonitis of right buttock     Gout, unspecified     Hemangioma unspecified site     Hematuria with malrotation rt kidney     Hematuria, unspecified     Hypertension     Hypothyroidism, unspecified     Low back pain     Lumbar radiculitis     Lumbar spondylolysis 2015    L4-L5    Migraine, unspecified, not intractable, without status migrainosus     Mixed hyperlipidemia     Moderate persistent asthma with (acute) exacerbation     Morbid (severe) obesity due to excess calories     Neoplasm of uncertain behavior of colon     Other fatigue     Other long term (current) drug therapy     Pain in unspecified hip     Peripheral edema     bilateral    Primary generalized (osteo)arthritis     Primary pulmonary hypertension     Prominent rt hepatic lobe 2015    RAD (reactive airway disease)     Shortness of breath     Unspecified atrial fibrillation     Unspecified menopausal and perimenopausal disorder        Past Surgical History:   Procedure Laterality Date    COLONOSCOPY      polyps removed x 2    CYST REMOVAL Left     breast    CYST REMOVAL      lower spine    CYSTOSCOPY  2005    EPIDURAL      L SPINE EPIDURAL    HYSTERECTOMY      partial    LUMBAR DISCECTOMY      L SPINE DISC AND CYST REMOVAL (L4-5 AND L5-S1)     OTHER SURGICAL HISTORY  2010    TROCHANTER BURSA INJECTION        History reviewed. No pertinent family history.     Current Medications        Current Outpatient Medications:     albuterol sulfate  (90 Base) MCG/ACT inhaler, , Disp: , Rfl:     allopurinol (ZYLOPRIM)  300 MG tablet, TAKE 1 TABLET BY MOUTH DAILY, Disp: 90 tablet, Rfl: 1    amLODIPine (NORVASC) 10 MG tablet, TAKE 1 TABLET BY MOUTH DAILY, Disp: 90 tablet, Rfl: 1    benazepril (LOTENSIN) 20 MG tablet, TAKE 1 TABLET BY MOUTH DAILY, Disp: 90 tablet, Rfl: 1    Dextromethorphan-guaiFENesin (MUCINEX DM PO), Take 1 tablet by mouth Daily., Disp: , Rfl:     Eliquis 5 MG tablet tablet, TAKE 1 TABLET BY MOUTH TWICE A DAY, Disp: 60 tablet, Rfl: 5    fenofibrate (TRICOR) 48 MG tablet, TAKE 1 TABLET BY MOUTH DAILY, Disp: 90 tablet, Rfl: 1    fluticasone (FLONASE) 50 MCG/ACT nasal spray, 2 sprays by Each Nare route Daily., Disp: 16 mL, Rfl: 3    Fluticasone-Salmeterol (ADVAIR/WIXELA) 250-50 MCG/ACT DISKUS, , Disp: , Rfl:     furosemide (LASIX) 20 MG tablet, Take 1 tablet by mouth Daily., Disp: 90 tablet, Rfl: 1    glimepiride (AMARYL) 4 MG tablet, TAKE 1 TABLET BY MOUTH 2 TIMES A DAY, Disp: 180 tablet, Rfl: 1    glucose blood (FREESTYLE LITE) test strip, 1 each by Other route 3 (Three) Times a Day. Dispense brand covered by insurance. Dx E11.9, Disp: 300 each, Rfl: 0    HYDROcodone-acetaminophen (NORCO) 5-325 MG per tablet, Take 1 tablet by mouth Daily As Needed for Moderate Pain., Disp: 15 tablet, Rfl: 0    Insulin NPH, Human,, Isophane, (HumuLIN N KwikPen) 100 UNIT/ML injection, DIAL AND INJECT 18 UNITS IN THE MORNING AND 14 UNITS IN THE EVENING UNDER THE SKIN. MAX DAILY DOSE OF 32 UNITS., Disp: 30 mL, Rfl: 0    Insulin Pen Needle (Pen Needles) 32G X 6 MM misc, 1 each 2 (Two) Times a Day With Meals. Dx:  E11.9, Disp: 100 each, Rfl: 1    levocetirizine (XYZAL) 5 MG tablet, Take 1 tablet by mouth Every Evening., Disp: , Rfl:     levothyroxine (SYNTHROID, LEVOTHROID) 75 MCG tablet, TAKE 1 TABLET BY MOUTH DAILY, Disp: 90 tablet, Rfl: 1    meclizine (ANTIVERT) 12.5 MG tablet, Take 1 tablet by mouth 3 (Three) Times a Day As Needed for Dizziness., Disp: 30 tablet, Rfl: 0    metFORMIN (GLUCOPHAGE) 500 MG tablet, TAKE 2 TABLETS BY MOUTH  EVERY MORNING AND TAKE 1 TABLET BY MOUTH EVERY NIGHT AT BEDTIME, Disp: 270 tablet, Rfl: 1    metoprolol succinate XL (TOPROL-XL) 100 MG 24 hr tablet, Take 1 tablet by mouth Daily., Disp: 90 tablet, Rfl: 1    montelukast (SINGULAIR) 10 MG tablet, TAKE ONE TABLET BY MOUTH DAILY, Disp: 90 tablet, Rfl: 0    omeprazole (priLOSEC) 20 MG capsule, Take 1 capsule by mouth Daily As Needed., Disp: , Rfl:     rosuvastatin (CRESTOR) 5 MG tablet, Take 1 tablet by mouth Every Night., Disp: 90 tablet, Rfl: 1    clindamycin (Cleocin) 300 MG capsule, Take 1 capsule by mouth 4 (Four) Times a Day., Disp: 40 capsule, Rfl: 0     Allergies     Allergies   Allergen Reactions    Penicillins Shortness Of Breath and Itching    Shellfish-Derived Products Shortness Of Breath and Other (See Comments)     Throat closed up    Avelox [Moxifloxacin] Other (See Comments)     Vaginal and rectal soreness and bad discharge    Banana GI Intolerance    Keflex [Cephalexin] Hives    Monopril [Fosinopril] Cough    Serevent [Salmeterol] Other (See Comments)     Muscle spasms    Sulfa Antibiotics GI Intolerance    Talwin [Pentazocine] Hallucinations    Claritin [Loratadine] Palpitations       Social History       Social History     Social History Narrative    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     [employer]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        Immunizations     Immunization:  Immunization History   Administered Date(s) Administered    COVID-19 (MODERNA) 1st,2nd,3rd Dose Monovalent 03/17/2021, 04/14/2021    COVID-19 (PFIZER) BIVALENT 12+YRS 11/08/2022    Fluzone High-Dose 65+YRS 11/08/2017    Fluzone High-Dose 65+yrs 11/02/2021, 11/08/2022    Influenza, Unspecified 09/22/2020    Pneumococcal Conjugate 13-Valent (PCV13) 09/28/2016    Pneumococcal Polysaccharide (PPSV23) 02/01/2008    Td (TDVAX) 07/01/2008    Zostavax 09/01/2012    Zoster, Unspecified 09/01/2012          Objective     Objective     Vital Signs:   /54 (BP Location: Left arm, Patient Position: Sitting, Cuff Size:  "Adult)   Pulse 63   Temp 99.3 °F (37.4 °C) (Oral)   Ht 156.2 cm (61.5\")   Wt 88.7 kg (195 lb 9.6 oz)   SpO2 95%   BMI 36.36 kg/m²       Physical Exam  Vitals and nursing note reviewed.   Constitutional:       Appearance: Normal appearance. She is obese.   HENT:      Head: Normocephalic.      Jaw: Tenderness and swelling present.      Comments: Swelling and tenderness in the left lower jaw area.     Mouth/Throat:      Lips: Pink.      Mouth: Mucous membranes are moist.      Dentition: Abnormal dentition. Dental tenderness, dental caries and dental abscesses present.      Comments: The affected teeth are the 2 back molars on the left lower jaw.  Eyes:      Conjunctiva/sclera: Conjunctivae normal.      Pupils: Pupils are equal, round, and reactive to light.   Cardiovascular:      Rate and Rhythm: Normal rate and regular rhythm.      Pulses: Normal pulses.      Heart sounds: Normal heart sounds.   Pulmonary:      Effort: Pulmonary effort is normal.      Breath sounds: Normal breath sounds.   Abdominal:      General: Bowel sounds are normal.      Palpations: Abdomen is soft.   Musculoskeletal:         General: Normal range of motion.      Cervical back: Normal range of motion and neck supple.   Skin:     General: Skin is warm and dry.   Neurological:      General: No focal deficit present.      Mental Status: She is alert and oriented to person, place, and time.   Psychiatric:         Attention and Perception: Attention normal.         Mood and Affect: Mood and affect normal.         Behavior: Behavior normal. Behavior is cooperative.         Physical Exam        Results    The following data was reviewed by: JOCELYN Ventura on 05/29/25               Results           Assessment and Plan        Assessment and Plan       Dental abscess    Orders:    clindamycin (Cleocin) 300 MG capsule; Take 1 capsule by mouth 4 (Four) Times a Day.    Essential hypertension    Blood pressure elevated today at 154/48 initially, " rechecked prior to leaving the office it was 150/54.  Continue taking metoprolol, furosemide, benazepril and amlodipine as directed.  Monitor blood pressure at home.  Follow-up with PCP.            Assessment & Plan  1. Dental pain.  - Significant pain in the back teeth, swollen glands, and difficulty opening the mouth.  - Night sweats and low-grade fever reported.  - Prescription for clindamycin 300 mg, to be taken 4 times daily for 10 days, has been issued and sent to pharmacy.  - Advised to complete the full course of antibiotics and contact the dentist today to schedule an appointment for further evaluation and treatment after completing the antibiotic course.        Follow Up        Follow Up   Return for With PCP, Next scheduled follow up, sooner if condition worsens.  Patient was given instructions and counseling regarding her condition or for health maintenance advice. Please see specific information pulled into the AVS if appropriate.

## 2025-06-03 ENCOUNTER — CLINICAL SUPPORT (OUTPATIENT)
Dept: FAMILY MEDICINE CLINIC | Age: 85
End: 2025-06-03
Payer: MEDICARE

## 2025-06-03 DIAGNOSIS — J30.89 ALLERGIC RHINITIS DUE TO OTHER ALLERGIC TRIGGER, UNSPECIFIED SEASONALITY: Primary | ICD-10-CM

## 2025-06-03 PROCEDURE — 95117 IMMUNOTHERAPY INJECTIONS: CPT | Performed by: FAMILY MEDICINE

## 2025-06-18 ENCOUNTER — CLINICAL SUPPORT (OUTPATIENT)
Dept: FAMILY MEDICINE CLINIC | Age: 85
End: 2025-06-18
Payer: MEDICARE

## 2025-06-18 DIAGNOSIS — J30.89 ALLERGIC RHINITIS DUE TO OTHER ALLERGIC TRIGGER, UNSPECIFIED SEASONALITY: Primary | ICD-10-CM

## 2025-06-18 PROCEDURE — 95117 IMMUNOTHERAPY INJECTIONS: CPT | Performed by: FAMILY MEDICINE

## 2025-06-28 DIAGNOSIS — I10 ESSENTIAL HYPERTENSION: ICD-10-CM

## 2025-06-30 RX ORDER — AMLODIPINE BESYLATE 10 MG/1
10 TABLET ORAL DAILY
Qty: 90 TABLET | Refills: 1 | Status: SHIPPED | OUTPATIENT
Start: 2025-06-30

## 2025-07-11 ENCOUNTER — CLINICAL SUPPORT (OUTPATIENT)
Dept: FAMILY MEDICINE CLINIC | Age: 85
End: 2025-07-11
Payer: MEDICARE

## 2025-07-11 DIAGNOSIS — J30.89 ALLERGIC RHINITIS DUE TO OTHER ALLERGIC TRIGGER, UNSPECIFIED SEASONALITY: Primary | ICD-10-CM

## 2025-07-11 PROCEDURE — 95117 IMMUNOTHERAPY INJECTIONS: CPT | Performed by: FAMILY MEDICINE

## 2025-07-12 DIAGNOSIS — I10 ESSENTIAL HYPERTENSION: ICD-10-CM

## 2025-07-15 RX ORDER — BENAZEPRIL HYDROCHLORIDE 20 MG/1
20 TABLET ORAL DAILY
Qty: 90 TABLET | Refills: 1 | Status: SHIPPED | OUTPATIENT
Start: 2025-07-15

## 2025-07-26 DIAGNOSIS — E78.2 MIXED HYPERLIPIDEMIA: ICD-10-CM

## 2025-07-28 RX ORDER — ROSUVASTATIN CALCIUM 5 MG/1
5 TABLET, COATED ORAL NIGHTLY
Qty: 90 TABLET | Refills: 1 | Status: SHIPPED | OUTPATIENT
Start: 2025-07-28

## 2025-08-01 ENCOUNTER — HOSPITAL ENCOUNTER (OUTPATIENT)
Dept: GENERAL RADIOLOGY | Facility: HOSPITAL | Age: 85
Discharge: HOME | End: 2025-08-01
Payer: MEDICARE

## 2025-08-01 ENCOUNTER — LAB (OUTPATIENT)
Dept: LAB | Facility: HOSPITAL | Age: 85
End: 2025-08-01
Payer: MEDICARE

## 2025-08-01 ENCOUNTER — OFFICE VISIT (OUTPATIENT)
Dept: FAMILY MEDICINE CLINIC | Age: 85
End: 2025-08-01
Payer: MEDICARE

## 2025-08-01 VITALS
BODY MASS INDEX: 37.53 KG/M2 | DIASTOLIC BLOOD PRESSURE: 78 MMHG | WEIGHT: 198.8 LBS | HEART RATE: 53 BPM | OXYGEN SATURATION: 97 % | HEIGHT: 61 IN | TEMPERATURE: 98.2 F | SYSTOLIC BLOOD PRESSURE: 175 MMHG

## 2025-08-01 DIAGNOSIS — J30.1 ALLERGIC RHINITIS DUE TO POLLEN, UNSPECIFIED SEASONALITY: ICD-10-CM

## 2025-08-01 DIAGNOSIS — G89.29 CHRONIC PAIN OF BOTH KNEES: ICD-10-CM

## 2025-08-01 DIAGNOSIS — M25.562 CHRONIC PAIN OF BOTH KNEES: ICD-10-CM

## 2025-08-01 DIAGNOSIS — I48.0 PAROXYSMAL ATRIAL FIBRILLATION: ICD-10-CM

## 2025-08-01 DIAGNOSIS — I10 ESSENTIAL HYPERTENSION: ICD-10-CM

## 2025-08-01 DIAGNOSIS — M1A.09X0 IDIOPATHIC CHRONIC GOUT OF MULTIPLE SITES WITHOUT TOPHUS: ICD-10-CM

## 2025-08-01 DIAGNOSIS — I27.20 PULMONARY HYPERTENSION: ICD-10-CM

## 2025-08-01 DIAGNOSIS — Z78.0 POSTMENOPAUSAL STATE: ICD-10-CM

## 2025-08-01 DIAGNOSIS — E03.9 ACQUIRED HYPOTHYROIDISM: ICD-10-CM

## 2025-08-01 DIAGNOSIS — E11.65 TYPE 2 DIABETES MELLITUS WITH HYPERGLYCEMIA, WITHOUT LONG-TERM CURRENT USE OF INSULIN: ICD-10-CM

## 2025-08-01 DIAGNOSIS — M25.561 CHRONIC PAIN OF BOTH KNEES: ICD-10-CM

## 2025-08-01 DIAGNOSIS — J42 CHRONIC BRONCHITIS, UNSPECIFIED CHRONIC BRONCHITIS TYPE: ICD-10-CM

## 2025-08-01 DIAGNOSIS — E78.2 MIXED HYPERLIPIDEMIA: ICD-10-CM

## 2025-08-01 DIAGNOSIS — Z00.00 ANNUAL PHYSICAL EXAM: Primary | ICD-10-CM

## 2025-08-01 DIAGNOSIS — J45.40 MODERATE PERSISTENT ASTHMA WITHOUT COMPLICATION: ICD-10-CM

## 2025-08-01 LAB
ALBUMIN SERPL-MCNC: 4.6 G/DL (ref 3.5–5.2)
ALBUMIN UR-MCNC: 13.5 MG/DL
ALBUMIN/GLOB SERPL: 1.4 G/DL
ALP SERPL-CCNC: 70 U/L (ref 39–117)
ALT SERPL W P-5'-P-CCNC: 15 U/L (ref 1–33)
ANION GAP SERPL CALCULATED.3IONS-SCNC: 14.6 MMOL/L (ref 5–15)
AST SERPL-CCNC: 22 U/L (ref 1–32)
BILIRUB SERPL-MCNC: 0.3 MG/DL (ref 0–1.2)
BUN SERPL-MCNC: 17 MG/DL (ref 8–23)
BUN/CREAT SERPL: 16.8 (ref 7–25)
CALCIUM SPEC-SCNC: 10.6 MG/DL (ref 8.6–10.5)
CHLORIDE SERPL-SCNC: 103 MMOL/L (ref 98–107)
CHOLEST SERPL-MCNC: 165 MG/DL (ref 0–200)
CO2 SERPL-SCNC: 24.4 MMOL/L (ref 22–29)
CREAT SERPL-MCNC: 1.01 MG/DL (ref 0.57–1)
CREAT UR-MCNC: 15.2 MG/DL
EGFRCR SERPLBLD CKD-EPI 2021: 55.3 ML/MIN/1.73
GLOBULIN UR ELPH-MCNC: 3.2 GM/DL
GLUCOSE SERPL-MCNC: 95 MG/DL (ref 65–99)
HBA1C MFR BLD: 6.7 % (ref 4.8–5.6)
HDLC SERPL-MCNC: 46 MG/DL (ref 40–60)
LDLC SERPL CALC-MCNC: 85 MG/DL (ref 0–100)
LDLC/HDLC SERPL: 1.7 {RATIO}
MICROALBUMIN/CREAT UR: 888.2 MG/G (ref 0–29)
POTASSIUM SERPL-SCNC: 4.4 MMOL/L (ref 3.5–5.2)
PROT SERPL-MCNC: 7.8 G/DL (ref 6–8.5)
SODIUM SERPL-SCNC: 142 MMOL/L (ref 136–145)
TRIGL SERPL-MCNC: 203 MG/DL (ref 0–150)
TSH SERPL DL<=0.05 MIU/L-ACNC: 1.77 UIU/ML (ref 0.27–4.2)
URATE SERPL-MCNC: 4.4 MG/DL (ref 2.4–5.7)
VLDLC SERPL-MCNC: 34 MG/DL (ref 5–40)

## 2025-08-01 PROCEDURE — 82043 UR ALBUMIN QUANTITATIVE: CPT

## 2025-08-01 PROCEDURE — 84443 ASSAY THYROID STIM HORMONE: CPT

## 2025-08-01 PROCEDURE — 84550 ASSAY OF BLOOD/URIC ACID: CPT

## 2025-08-01 PROCEDURE — 80053 COMPREHEN METABOLIC PANEL: CPT

## 2025-08-01 PROCEDURE — 83036 HEMOGLOBIN GLYCOSYLATED A1C: CPT

## 2025-08-01 PROCEDURE — 36415 COLL VENOUS BLD VENIPUNCTURE: CPT

## 2025-08-01 PROCEDURE — 80061 LIPID PANEL: CPT

## 2025-08-01 PROCEDURE — 73562 X-RAY EXAM OF KNEE 3: CPT

## 2025-08-01 PROCEDURE — 82570 ASSAY OF URINE CREATININE: CPT

## 2025-08-01 RX ORDER — ALLOPURINOL 300 MG/1
300 TABLET ORAL DAILY
Qty: 90 TABLET | Refills: 1 | Status: SHIPPED | OUTPATIENT
Start: 2025-08-01

## 2025-08-01 RX ORDER — APIXABAN 5 MG/1
5 TABLET, FILM COATED ORAL 2 TIMES DAILY
Qty: 60 TABLET | Refills: 5 | Status: SHIPPED | OUTPATIENT
Start: 2025-08-01

## 2025-08-01 RX ORDER — MONTELUKAST SODIUM 10 MG/1
10 TABLET ORAL DAILY
Qty: 90 TABLET | Refills: 1 | Status: SHIPPED | OUTPATIENT
Start: 2025-08-01

## 2025-08-01 RX ORDER — FENOFIBRATE 48 MG/1
48 TABLET, FILM COATED ORAL DAILY
Qty: 90 TABLET | Refills: 1 | Status: SHIPPED | OUTPATIENT
Start: 2025-08-01

## 2025-08-01 RX ORDER — GLIMEPIRIDE 4 MG/1
4 TABLET ORAL
Start: 2025-08-01

## 2025-08-01 RX ORDER — ERGOCALCIFEROL (VITAMIN D2) 10 MCG
400 TABLET ORAL DAILY
COMMUNITY

## 2025-08-01 RX ORDER — FLUTICASONE PROPIONATE 50 MCG
2 SPRAY, SUSPENSION (ML) NASAL DAILY
Qty: 48 G | Refills: 3 | Status: SHIPPED | OUTPATIENT
Start: 2025-08-01

## 2025-08-01 RX ORDER — METOPROLOL SUCCINATE 100 MG/1
100 TABLET, EXTENDED RELEASE ORAL DAILY
Qty: 90 TABLET | Refills: 1 | Status: SHIPPED | OUTPATIENT
Start: 2025-08-01

## 2025-08-01 RX ORDER — BENAZEPRIL HYDROCHLORIDE 40 MG/1
40 TABLET ORAL DAILY
Qty: 90 TABLET | Refills: 1 | Status: SHIPPED | OUTPATIENT
Start: 2025-08-01

## 2025-08-01 RX ORDER — LEVOTHYROXINE SODIUM 75 UG/1
75 TABLET ORAL DAILY
Qty: 90 TABLET | Refills: 1 | Status: SHIPPED | OUTPATIENT
Start: 2025-08-01

## 2025-08-01 NOTE — ASSESSMENT & PLAN NOTE
Orders:    TSH; Future    levothyroxine (SYNTHROID, LEVOTHROID) 75 MCG tablet; Take 1 tablet by mouth Daily.

## 2025-08-01 NOTE — PROGRESS NOTES
Subjective   The ABCs of the Annual Wellness Visit  Medicare Wellness Visit    Patient or patient representative verbalized consent for the use of Ambient Listening during the visit with  Sakina Donohue MD for chart documentation. 8/1/2025  13:13 EDT    Mary Chopra is a 84 y.o. patient who presents for a Medicare Wellness Visit.    The following portions of the patient's history were reviewed and   updated as appropriate: allergies, current medications, past family history, past medical history, past social history, past surgical history, and problem list.    Compared to one year ago, the patient's physical   health is worse.  Compared to one year ago, the patient's mental   health is the same.    Recent Hospitalizations:  She was not admitted to the hospital during the last year.     Current Medical Providers:  Patient Care Team:  Sakina Donohue MD as PCP - General (Family Medicine)  Samir Rossi MD as Consulting Physician (Cardiology)  Linda Baumann MD as Consulting Physician (Dermatology)  Mg Cowan OD as Consulting Physician (Optometry)  Peewee Pierre MD as Consulting Physician (Allergy and Immunology)    Outpatient Medications Prior to Visit   Medication Sig Dispense Refill    albuterol sulfate  (90 Base) MCG/ACT inhaler       amLODIPine (NORVASC) 10 MG tablet TAKE 1 TABLET BY MOUTH DAILY 90 tablet 1    Dextromethorphan-guaiFENesin (MUCINEX DM PO) Take 1 tablet by mouth Daily.      Fluticasone-Salmeterol (ADVAIR/WIXELA) 250-50 MCG/ACT DISKUS       furosemide (LASIX) 20 MG tablet Take 1 tablet by mouth Daily. 90 tablet 1    glucose blood (FREESTYLE LITE) test strip 1 each by Other route 3 (Three) Times a Day. Dispense brand covered by insurance. Dx E11.9 300 each 0    HYDROcodone-acetaminophen (NORCO) 5-325 MG per tablet Take 1 tablet by mouth Daily As Needed for Moderate Pain. 15 tablet 0    Insulin NPH, Human,, Isophane, (HumuLIN N KwikPen) 100  UNIT/ML injection DIAL AND INJECT 18 UNITS IN THE MORNING AND 14 UNITS IN THE EVENING UNDER THE SKIN. MAX DAILY DOSE OF 32 UNITS. 30 mL 0    Insulin Pen Needle (Pen Needles) 32G X 6 MM misc 1 each 2 (Two) Times a Day With Meals. Dx:  E11.9 100 each 1    levocetirizine (XYZAL) 5 MG tablet Take 1 tablet by mouth Every Evening.      omeprazole (priLOSEC) 20 MG capsule Take 1 capsule by mouth Daily As Needed.      rosuvastatin (CRESTOR) 5 MG tablet TAKE ONE TABLET BY MOUTH ONCE NIGHTLY 90 tablet 1    Vitamin D, Cholecalciferol, (CHOLECALCIFEROL) 10 MCG (400 UNIT) tablet Take 1 tablet by mouth Daily.      allopurinol (ZYLOPRIM) 300 MG tablet TAKE 1 TABLET BY MOUTH DAILY 90 tablet 1    benazepril (LOTENSIN) 20 MG tablet TAKE 1 TABLET BY MOUTH DAILY 90 tablet 1    Eliquis 5 MG tablet tablet TAKE 1 TABLET BY MOUTH TWICE A DAY 60 tablet 5    fenofibrate (TRICOR) 48 MG tablet TAKE 1 TABLET BY MOUTH DAILY 90 tablet 1    fluticasone (FLONASE) 50 MCG/ACT nasal spray 2 sprays by Each Nare route Daily. 16 mL 3    glimepiride (AMARYL) 4 MG tablet TAKE 1 TABLET BY MOUTH 2 TIMES A  tablet 1    levothyroxine (SYNTHROID, LEVOTHROID) 75 MCG tablet TAKE 1 TABLET BY MOUTH DAILY 90 tablet 1    metFORMIN (GLUCOPHAGE) 500 MG tablet TAKE 2 TABLETS BY MOUTH EVERY MORNING AND TAKE 1 TABLET BY MOUTH EVERY NIGHT AT BEDTIME 270 tablet 1    metoprolol succinate XL (TOPROL-XL) 100 MG 24 hr tablet Take 1 tablet by mouth Daily. 90 tablet 1    montelukast (SINGULAIR) 10 MG tablet TAKE ONE TABLET BY MOUTH DAILY 90 tablet 0    clindamycin (Cleocin) 300 MG capsule Take 1 capsule by mouth 4 (Four) Times a Day. (Patient not taking: Reported on 8/1/2025) 40 capsule 0    meclizine (ANTIVERT) 12.5 MG tablet Take 1 tablet by mouth 3 (Three) Times a Day As Needed for Dizziness. (Patient not taking: Reported on 8/1/2025) 30 tablet 0     No facility-administered medications prior to visit.     Opioid medication/s are on active medication list.  and I have  "evaluated her active treatment plan and pain score trends (see table).  Vitals:    08/01/25 1249   PainSc: 7    PainLoc: Abdomen     I have reviewed the chart for potential of high risk medication and harmful drug interactions in the elderly.        Aspirin is not on active medication list.  Aspirin use is not indicated based on review of current medical condition/s. Risk of harm outweighs potential benefits.  .    Patient Active Problem List   Diagnosis    Anxiety    Generalized osteoarthritis    Moderate persistent asthma without complication    Gastroesophageal reflux disease without esophagitis    Essential hypertension    Mixed hyperlipidemia    Acquired hypothyroidism    Type 2 diabetes mellitus with hyperglycemia, without long-term current use of insulin    Paroxysmal atrial fibrillation    Pulmonary hypertension    Chronic pain of both knees    Right hip pain    Chronic right-sided low back pain with right-sided sciatica    Chronic bronchitis    Idiopathic chronic gout of multiple sites without tophus     Advance Care Planning Advance Directive is not on file.  ACP discussion was held with the patient during this visit. Patient does not have an advance directive, information provided.            Objective   Vitals:    08/01/25 1249   BP: 173/84   Pulse: 53   Temp: 98.2 °F (36.8 °C)   TempSrc: Oral   SpO2: 97%   Weight: 90.2 kg (198 lb 12.8 oz)   Height: 156.2 cm (61.5\")   PainSc: 7    PainLoc: Abdomen       Estimated body mass index is 36.96 kg/m² as calculated from the following:    Height as of this encounter: 156.2 cm (61.5\").    Weight as of this encounter: 90.2 kg (198 lb 12.8 oz).                Does the patient have evidence of cognitive impairment? No                                                                                                Health  Risk Assessment    Smoking Status:  Social History     Tobacco Use   Smoking Status Former    Current packs/day: 0.00    Average packs/day: 1 pack/day " for 20.0 years (20.0 ttl pk-yrs)    Types: Cigarettes    Start date: 1970    Quit date: 1990    Years since quittin.6   Smokeless Tobacco Never     Alcohol Consumption:  Social History     Substance and Sexual Activity   Alcohol Use No       Fall Risk Screen  STEADI Fall Risk Assessment was completed, and patient is at LOW risk for falls.Assessment completed on:2025    Depression Screening   Little interest or pleasure in doing things? Not at all   Feeling down, depressed, or hopeless? Not at all   PHQ-2 Total Score 0      Health Habits and Functional and Cognitive Screenin/1/2025     1:03 PM   Functional & Cognitive Status   Do you have difficulty concentrating, remembering or making decisions? Yes           Age-appropriate Screening Schedule:  Refer to the list below for future screening recommendations based on patient's age, sex and/or medical conditions. Orders for these recommended tests are listed in the plan section. The patient has been provided with a written plan.    Health Maintenance List  Health Maintenance   Topic Date Due    ZOSTER VACCINE (2 of 3) 10/27/2012    RSV Vaccine - Adults (1 - 1-dose 75+ series) Never done    TDAP/TD VACCINES (2 - Tdap) 2018    DIABETIC EYE EXAM  08/15/2024    URINE MICROALBUMIN-CREATININE RATIO (uACR)  2025    HEMOGLOBIN A1C  2025    DXA SCAN  2025    COVID-19 Vaccine ( - - season) 2026 (Originally 2024)    INFLUENZA VACCINE  10/01/2025    DIABETIC FOOT EXAM  2026    LIPID PANEL  2026    ANNUAL WELLNESS VISIT  2026    Pneumococcal Vaccine 50+  Completed    COLORECTAL CANCER SCREENING  Discontinued                                                                                                                                                CMS Preventative Services Quick Reference  Risk Factors Identified During Encounter  Immunizations Discussed/Encouraged: Tdap, Shingrix, COVID19, and RSV  (Respiratory Syncytial Virus)  Dental Screening Recommended    The above risks/problems have been discussed with the patient.  Pertinent information has been shared with the patient in the After Visit Summary.  An After Visit Summary and PPPS were made available to the patient.    Follow Up:   Next Medicare Wellness visit to be scheduled in 1 year.         Additional E&M Note during same encounter follows:  Patient has additional, significant, and separately identifiable condition(s)/problem(s) that require work above and beyond the Medicare Wellness Visit     Chief Complaint  Medicare Wellness-subsequent    Subjective   HPI  Mary is also being seen today for additional medical problem/s.    History of Present Illness  The patient is an 84-year-old female here today for her Medicare wellness visit and to follow up on routine issues.    She has been experiencing gastrointestinal issues, including constant diarrhea and constipation, which she attributes to a sudden increase in fiber intake from fruits. She also mentions a known allergy to bananas, which she has been consuming daily. She has a history of polyps and is considering a colonoscopy due to her current pain.    She has been monitoring her blood sugar levels closely, with readings ranging from below 70 to over 150. She has had a few instances of hypoglycemia, with readings in the 40s and 50s, which cause her to feel off balance and unable to walk straight. In July 2025, she recorded four readings of 250, the highest she has ever had. Her last A1c was 6.8, down from a previous high of 9. She feels better when her blood sugar is slightly elevated, as it gives her more energy and motivation. She continues to take glimepiride and insulin and has found that a small snack before bed helps maintain her blood sugar levels overnight.    She reports no issues with atrial fibrillation or her blood thinner medication. She was tested for atrial fibrillation during a  hospital stay for COVID-19.    She has been experiencing drainage due to allergies, which is somewhat alleviated by regular use of montelukast and Flonase. She requests refills of these medications.    She has been suffering from severe knee pain for the past 6 months, which has significantly limited her mobility. She has noticed a bulge over the L anterior knee.  She has not had any x-rays of her knees.    She has been experiencing dry, itchy skin on her face, with crusting of her eyelashes upon waking. She has found some relief with a moisturizer designed for diabetic skin.    She is currently taking amlodipine, benazepril, and furosemide for blood pressure management. She does not monitor her blood pressure at home.    She had cataract surgery and was scheduled to go back because she developed a film behind the lens and needs surgery again. She had an appointment scheduled to have it done but had to cancel that appointment because she busted a tooth and then the tooth behind it and had to go and have teeth surgically removed.    She has not received the RSV vaccine and prefers to wait another 6 months before getting the pneumococcal vaccine. She does not have an advanced care directive.       She is on metformin, glimepiride and insulin NPH for diabetes.  Last A1c 6.8 Jan.  She has noticed some hypoglycemic episodes, down to 50s.  She is on statin and an ACEI.  She is reportedly UTD on eye exam, last done earlier this year by Dr. Cowan; records requested.  She is UTD on foot exam (1/2025).      She is on amlodipine, benazepril, and metoprolol succinate for HTN.  She is on Eliquis for a-fib.  She is on furosemide for pedal edema.  She follows with Cards.  +Pulmonary HTN.  She has been on prazosin in the past (headache).     She is on rosuvastatin and fenofibrate for HLD.      She is on levothyroxine for hypothyroidism.      She is on omeprazole for GERD.       She is on Advair, montelukast, Flonase and  "azelastine for allergies and COPD/asthma.       She is on allopurinol for gout.      She uses acetaminophen for chronic knee pain.  She keeps a small supply of Norco on hand, using #15 about every year.  She ambulates with a cane over significant distance.  PT was ineffective.     Review of Systems   Constitutional:  Positive for fatigue. Negative for chills and fever.   HENT:  Negative for congestion, hearing loss and rhinorrhea.    Eyes:  Negative for pain and visual disturbance.   Respiratory:  Negative for cough and shortness of breath.    Cardiovascular:  Positive for leg swelling (bilateral ankles). Negative for chest pain and palpitations.   Gastrointestinal:  Positive for abdominal pain, constipation and diarrhea. Negative for nausea and vomiting.   Genitourinary:  Negative for difficulty urinating and dysuria.   Musculoskeletal:  Positive for arthralgias and back pain. Negative for myalgias.   Neurological:  Negative for weakness and numbness.   Psychiatric/Behavioral:  Negative for dysphoric mood and sleep disturbance. The patient is not nervous/anxious.               Objective   Vital Signs:  /84   Pulse 53   Temp 98.2 °F (36.8 °C) (Oral)   Ht 156.2 cm (61.5\")   Wt 90.2 kg (198 lb 12.8 oz)   SpO2 97%   BMI 36.96 kg/m²   Physical Exam  Vitals reviewed.   Constitutional:       General: She is not in acute distress.     Appearance: Normal appearance. She is well-developed.   HENT:      Head: Normocephalic and atraumatic.      Right Ear: External ear normal.      Left Ear: External ear normal.      Mouth/Throat:      Mouth: Mucous membranes are moist.   Eyes:      Extraocular Movements: Extraocular movements intact.      Conjunctiva/sclera: Conjunctivae normal.      Pupils: Pupils are equal, round, and reactive to light.   Cardiovascular:      Rate and Rhythm: Normal rate. Rhythm irregularly irregular.      Heart sounds: No murmur heard.  Pulmonary:      Effort: Pulmonary effort is normal.      " Breath sounds: Normal breath sounds. No wheezing, rhonchi or rales.   Abdominal:      General: Bowel sounds are normal. There is no distension.      Palpations: Abdomen is soft.      Tenderness: There is no abdominal tenderness.   Musculoskeletal:         General: Normal range of motion.   Skin:     General: Skin is warm and dry.   Neurological:      Mental Status: She is alert and oriented to person, place, and time.      Deep Tendon Reflexes: Reflexes normal.   Psychiatric:         Mood and Affect: Mood and affect normal.         Behavior: Behavior normal.         Thought Content: Thought content normal.         Judgment: Judgment normal.       Lab Results   Component Value Date    GLUCOSE 158 (H) 01/24/2025    BUN 15 01/24/2025    CREATININE 0.89 01/24/2025    EGFR 64.4 01/24/2025    BCR 16.9 01/24/2025    K 4.3 01/24/2025    CO2 25.0 01/24/2025    CALCIUM 10.0 01/24/2025    ALBUMIN 4.3 01/24/2025    BILITOT 0.2 01/24/2025    AST 17 01/24/2025    ALT 13 01/24/2025       Lab Results   Component Value Date    CHOL 142 01/24/2025    CHLPL 213 (H) 03/23/2021    TRIG 192 (H) 01/24/2025    HDL 43 01/24/2025    LDL 67 01/24/2025       Lab Results   Component Value Date    WBC 9.99 07/25/2024    HGB 12.3 07/25/2024    HCT 39.3 07/25/2024    MCV 86.9 07/25/2024     07/25/2024     Lab Results   Component Value Date    HGBA1C 6.80 (H) 01/24/2025                   Assessment and Plan      Annual physical exam  Colonoscopy is no longer indicated by age and history. Pap smear is no longer indicated by age and history.  Mammogram is no longer indicated by age and history. She is UTD on DEXA, last done 9/2023 and this showed osteopenia; ordered.  She is UTD on Pneumovax (2/2008), [id number] (9/2016), Zostavax (9/2012).  She is due for COVID, Naqxzar92, RSV, Shingrix and Tdap.  COVID and Hqhzsfk52 declined.  Shingrix, RSV and Tdap can be done at the pharmacy.  Flu shot is not currently indicated.  She is due for routine  labs including diabetes panel, TSH and uric acid; ordered.         Type 2 diabetes mellitus with hyperglycemia, without long-term current use of insulin      Orders:    Comprehensive Metabolic Panel; Future    Lipid Panel; Future    Hemoglobin A1c; Future    Microalbumin / Creatinine Urine Ratio - Urine, Clean Catch; Future    glimepiride (AMARYL) 4 MG tablet; Take 1 tablet by mouth Every Morning Before Breakfast.    metFORMIN (GLUCOPHAGE) 500 MG tablet; TAKE 2 TABLETS BY MOUTH EVERY MORNING AND TAKE 1 TABLET BY MOUTH EVERY NIGHT AT BEDTIME    Essential hypertension      Orders:    Comprehensive Metabolic Panel; Future    Lipid Panel; Future    benazepril (LOTENSIN) 40 MG tablet; Take 1 tablet by mouth Daily.    metoprolol succinate XL (TOPROL-XL) 100 MG 24 hr tablet; Take 1 tablet by mouth Daily.    Mixed hyperlipidemia       Orders:    Comprehensive Metabolic Panel; Future    Lipid Panel; Future    fenofibrate (TRICOR) 48 MG tablet; Take 1 tablet by mouth Daily.    Paroxysmal atrial fibrillation    Orders:    Eliquis 5 MG tablet tablet; Take 1 tablet by mouth 2 (Two) Times a Day.    Pulmonary hypertension         Acquired hypothyroidism    Orders:    TSH; Future    levothyroxine (SYNTHROID, LEVOTHROID) 75 MCG tablet; Take 1 tablet by mouth Daily.    Moderate persistent asthma without complication            Orders:    montelukast (SINGULAIR) 10 MG tablet; Take 1 tablet by mouth Daily.    Chronic bronchitis, unspecified chronic bronchitis type    Orders:    montelukast (SINGULAIR) 10 MG tablet; Take 1 tablet by mouth Daily.    Idiopathic chronic gout of multiple sites without tophus    Orders:    Uric Acid; Future    allopurinol (ZYLOPRIM) 300 MG tablet; Take 1 tablet by mouth Daily.    Allergic rhinitis due to pollen, unspecified seasonality    Orders:    fluticasone (FLONASE) 50 MCG/ACT nasal spray; 2 sprays by Each Nare route Daily.    Postmenopausal state    Orders:    DEXA Bone Density Axial; Future    Chronic  pain of both knees    Orders:    XR Knee 3 View Left; Future    XR Knee 3 View Right; Future      Assessment & Plan  Constipation:  - Symptoms suggest a sudden increase in fiber intake may have upset her stomach.  - Advised to reduce fruit salad portion by half to alleviate bowel issues.  - If symptoms persist, a colonoscopy may be considered.    Diabetes Mellitus:  - A1c level is slightly low at 6.8; goal is to maintain between 7 and 8.  - Will continue glimepiride regimen, taking one dose in the morning and discontinuing nighttime dose.  - Adjustment aims to prevent hypoglycemic episodes.    Atrial Fibrillation:  - Reports no recent issues with atrial fibrillation.  - Will continue current medication regimen, including blood thinner.    Allergic Rhinitis:  - Regular use of medications helps with symptoms.  - Refills for montelukast, Flonase, and Xyzal will be provided.    Bilateral knee Pain:  - Reports significant knee pain affecting mobility.  - X-rays of knees will be ordered today.  - Referral to an orthopedist will be considered based on results.    Dry Skin:  - Dry skin could be due to allergies or thyroid issues.  - Thyroid function test will be ordered.  - Advised to continue using a good facial moisturizer.    Hypertension:  - Blood pressure is elevated today.  - Benazepril dosage will be increased from 20 mg to 40 mg.  - If she has refills of the 20 mg tablets, she can take two tablets to achieve the new dosage.    Cataract Surgery Follow-up:  - Needs to reschedule appointment for surgery to address film developed behind the lens.    Health Maintenance:  - Due for a bone density scan, which will be scheduled.  - Advised to consider RSV vaccine and new pneumonia shot, prefers to wait another 6 months before getting pneumococcal vaccine.          Follow Up   Return in about 6 months (around 2/1/2026) for Recheck.  Patient was given instructions and counseling regarding her condition or for health  maintenance advice. Please see specific information pulled into the AVS if appropriate.

## 2025-08-01 NOTE — ASSESSMENT & PLAN NOTE
{Diabetes (Optional):7845177972}    Orders:    Comprehensive Metabolic Panel; Future    Lipid Panel; Future    Hemoglobin A1c; Future    Microalbumin / Creatinine Urine Ratio - Urine, Clean Catch; Future    glimepiride (AMARYL) 4 MG tablet; Take 1 tablet by mouth Every Morning Before Breakfast.    metFORMIN (GLUCOPHAGE) 500 MG tablet; TAKE 2 TABLETS BY MOUTH EVERY MORNING AND TAKE 1 TABLET BY MOUTH EVERY NIGHT AT BEDTIME

## 2025-08-01 NOTE — ASSESSMENT & PLAN NOTE
{Hyperlipidemia A/P Block (Optional):7210606994}    Orders:    Comprehensive Metabolic Panel; Future    Lipid Panel; Future    fenofibrate (TRICOR) 48 MG tablet; Take 1 tablet by mouth Daily.

## 2025-08-01 NOTE — ASSESSMENT & PLAN NOTE
{Hypertension is (optional):4888173857}    Orders:    Comprehensive Metabolic Panel; Future    Lipid Panel; Future    benazepril (LOTENSIN) 40 MG tablet; Take 1 tablet by mouth Daily.    metoprolol succinate XL (TOPROL-XL) 100 MG 24 hr tablet; Take 1 tablet by mouth Daily.

## 2025-08-06 ENCOUNTER — RESULTS FOLLOW-UP (OUTPATIENT)
Dept: FAMILY MEDICINE CLINIC | Age: 85
End: 2025-08-06
Payer: MEDICARE

## 2025-08-09 DIAGNOSIS — E11.65 TYPE 2 DIABETES MELLITUS WITH HYPERGLYCEMIA, WITHOUT LONG-TERM CURRENT USE OF INSULIN: ICD-10-CM

## 2025-08-11 RX ORDER — GLIMEPIRIDE 4 MG/1
4 TABLET ORAL 2 TIMES DAILY
Qty: 180 TABLET | Refills: 0 | Status: SHIPPED | OUTPATIENT
Start: 2025-08-11

## 2025-08-14 ENCOUNTER — CLINICAL SUPPORT (OUTPATIENT)
Dept: FAMILY MEDICINE CLINIC | Age: 85
End: 2025-08-14
Payer: MEDICARE

## 2025-08-14 DIAGNOSIS — J30.1 ALLERGIC RHINITIS DUE TO POLLEN, UNSPECIFIED SEASONALITY: Primary | ICD-10-CM

## 2025-08-14 PROCEDURE — 95117 IMMUNOTHERAPY INJECTIONS: CPT | Performed by: FAMILY MEDICINE

## 2025-08-18 RX ORDER — BLOOD-GLUCOSE METER
1 KIT MISCELLANEOUS 3 TIMES DAILY
Qty: 200 STRIP | Refills: 3 | Status: SHIPPED | OUTPATIENT
Start: 2025-08-18